# Patient Record
Sex: MALE | Race: WHITE | NOT HISPANIC OR LATINO | Employment: OTHER | URBAN - METROPOLITAN AREA
[De-identification: names, ages, dates, MRNs, and addresses within clinical notes are randomized per-mention and may not be internally consistent; named-entity substitution may affect disease eponyms.]

---

## 2017-01-27 ENCOUNTER — HOSPITAL ENCOUNTER (EMERGENCY)
Facility: HOSPITAL | Age: 37
Discharge: HOME | End: 2017-01-27
Attending: EMERGENCY MEDICINE

## 2017-01-27 VITALS
RESPIRATION RATE: 22 BRPM | HEIGHT: 68 IN | WEIGHT: 235 LBS | OXYGEN SATURATION: 95 % | HEART RATE: 110 BPM | SYSTOLIC BLOOD PRESSURE: 133 MMHG | TEMPERATURE: 98 F | BODY MASS INDEX: 35.61 KG/M2 | DIASTOLIC BLOOD PRESSURE: 67 MMHG

## 2017-01-27 DIAGNOSIS — R07.89 CHEST PAIN, ATYPICAL: Primary | ICD-10-CM

## 2017-01-27 DIAGNOSIS — R00.2 PALPITATION: ICD-10-CM

## 2017-01-27 DIAGNOSIS — F41.9 ANXIETY: ICD-10-CM

## 2017-01-27 LAB
ALBUMIN SERPL BCP-MCNC: 4.2 G/DL
ALP SERPL-CCNC: 92 U/L
ALT SERPL W/O P-5'-P-CCNC: 14 U/L
ANION GAP SERPL CALC-SCNC: 12 MMOL/L
AST SERPL-CCNC: 16 U/L
BASOPHILS # BLD AUTO: 0.02 K/UL
BASOPHILS NFR BLD: 0.3 %
BILIRUB SERPL-MCNC: 0.2 MG/DL
BNP SERPL-MCNC: <10 PG/ML
BUN SERPL-MCNC: 11 MG/DL
CALCIUM SERPL-MCNC: 9.1 MG/DL
CHLORIDE SERPL-SCNC: 107 MMOL/L
CO2 SERPL-SCNC: 23 MMOL/L
CREAT SERPL-MCNC: 0.9 MG/DL
D DIMER PPP IA.FEU-MCNC: 0.23 MG/L FEU
DIFFERENTIAL METHOD: ABNORMAL
EOSINOPHIL # BLD AUTO: 0.1 K/UL
EOSINOPHIL NFR BLD: 0.8 %
ERYTHROCYTE [DISTWIDTH] IN BLOOD BY AUTOMATED COUNT: 13.9 %
EST. GFR  (AFRICAN AMERICAN): >60 ML/MIN/1.73 M^2
EST. GFR  (NON AFRICAN AMERICAN): >60 ML/MIN/1.73 M^2
GLUCOSE SERPL-MCNC: 142 MG/DL
HCT VFR BLD AUTO: 40.8 %
HGB BLD-MCNC: 13.9 G/DL
INR PPP: 1
LYMPHOCYTES # BLD AUTO: 2.1 K/UL
LYMPHOCYTES NFR BLD: 34.1 %
MCH RBC QN AUTO: 28 PG
MCHC RBC AUTO-ENTMCNC: 34.1 %
MCV RBC AUTO: 82 FL
MONOCYTES # BLD AUTO: 0.6 K/UL
MONOCYTES NFR BLD: 10.3 %
NEUTROPHILS # BLD AUTO: 3.4 K/UL
NEUTROPHILS NFR BLD: 54.5 %
PLATELET # BLD AUTO: 226 K/UL
PMV BLD AUTO: 9.6 FL
POTASSIUM SERPL-SCNC: 3.8 MMOL/L
PROT SERPL-MCNC: 7.7 G/DL
PROTHROMBIN TIME: 10.2 SEC
RBC # BLD AUTO: 4.97 M/UL
SODIUM SERPL-SCNC: 142 MMOL/L
TROPONIN I SERPL DL<=0.01 NG/ML-MCNC: 0.01 NG/ML
WBC # BLD AUTO: 6.19 K/UL

## 2017-01-27 PROCEDURE — 85379 FIBRIN DEGRADATION QUANT: CPT

## 2017-01-27 PROCEDURE — 96374 THER/PROPH/DIAG INJ IV PUSH: CPT

## 2017-01-27 PROCEDURE — 85610 PROTHROMBIN TIME: CPT

## 2017-01-27 PROCEDURE — 96375 TX/PRO/DX INJ NEW DRUG ADDON: CPT

## 2017-01-27 PROCEDURE — 63600175 PHARM REV CODE 636 W HCPCS: Performed by: EMERGENCY MEDICINE

## 2017-01-27 PROCEDURE — 99284 EMERGENCY DEPT VISIT MOD MDM: CPT | Mod: 25

## 2017-01-27 PROCEDURE — 93005 ELECTROCARDIOGRAM TRACING: CPT

## 2017-01-27 PROCEDURE — 25000003 PHARM REV CODE 250: Performed by: EMERGENCY MEDICINE

## 2017-01-27 PROCEDURE — 83880 ASSAY OF NATRIURETIC PEPTIDE: CPT

## 2017-01-27 PROCEDURE — 80053 COMPREHEN METABOLIC PANEL: CPT

## 2017-01-27 PROCEDURE — 85025 COMPLETE CBC W/AUTO DIFF WBC: CPT

## 2017-01-27 PROCEDURE — 84484 ASSAY OF TROPONIN QUANT: CPT

## 2017-01-27 RX ORDER — ONDANSETRON 2 MG/ML
4 INJECTION INTRAMUSCULAR; INTRAVENOUS
Status: COMPLETED | OUTPATIENT
Start: 2017-01-27 | End: 2017-01-27

## 2017-01-27 RX ORDER — LORAZEPAM 2 MG/ML
1 INJECTION INTRAMUSCULAR
Status: COMPLETED | OUTPATIENT
Start: 2017-01-27 | End: 2017-01-27

## 2017-01-27 RX ORDER — ASPIRIN 325 MG
325 TABLET ORAL
Status: COMPLETED | OUTPATIENT
Start: 2017-01-27 | End: 2017-01-27

## 2017-01-27 RX ADMIN — LORAZEPAM 1 MG: 2 INJECTION, SOLUTION INTRAMUSCULAR; INTRAVENOUS at 10:01

## 2017-01-27 RX ADMIN — ONDANSETRON 4 MG: 2 INJECTION INTRAMUSCULAR; INTRAVENOUS at 09:01

## 2017-01-27 RX ADMIN — ASPIRIN 325 MG ORAL TABLET 325 MG: 325 PILL ORAL at 09:01

## 2017-01-27 NOTE — ED AVS SNAPSHOT
[facility]  [place] [address]               Garth Tejeda   2017  9:25 PM   ED    Description:  Male : 1980   Department:  [facility]           Your Care was Coordinated By:     Provider Role From To    Ronal Whitlock MD Attending Provider [id number] --      Reason for Visit     Chest Pain           Diagnoses this Visit        Comments    Chest pain, atypical    -  Primary     Palpitation         Anxiety           ED Disposition     ED Disposition Condition Comment    Discharge             To Do List           Follow-up Information     Schedule an appointment as soon as possible for a visit with Gómez Parisi MD.    Specialty:  Family Medicine    Contact information:    [address] [address]  [phone]          Schedule an appointment as soon as possible for a visit with Deangelo eMdellin MD.    Specialty:  Cardiology    Contact information:    [address] [place] [id number] [phone]          Follow up with [facility].    Specialty:  Emergency Medicine    Why:  As needed, If symptoms worsen    Contact information:    [address] [place] [id number] [phone]      [facility]     [facility] -  Assistance  Registered nurses in the [facility] provide clinical advisement, health education, appointment booking, and other advisory services.  Call for this free service at [phone].             Medications           These medications were administered today        Dose Freq    aspirin tablet 325 mg 325 mg ED 1 Time    Sig: Take 1 tablet (325 mg total) by mouth ED 1 Time.    Class: Normal    Route: Oral    ondansetron injection 4 mg 4 mg ED 1 Time    Sig: Inject 4 mg into the vein ED 1 Time.    Class: Normal    Route: Intravenous    lorazepam injection 1 mg 1 mg ED 1 Time    Sig: Inject 0.5 mLs (1 mg total)  "into the vein ED 1 Time.    Class: Normal    Route: Intravenous           Verify that the below list of medications is an accurate representation of the medications you are currently taking.  If none reported, the list may be blank. If incorrect, please contact your healthcare provider. Carry this list with you in case of emergency.           Current Medications     carvedilol (COREG) 6.25 MG tablet TAKE ONE TABLET BY MOUTH TWICE DAILY WITH FOOD FOR BLOOD PRESSURE    clonazePAM (KLONOPIN) 1 MG tablet Take 1 tablet (1 mg total) by mouth 2 (two) times daily.    dextroamphetamine-amphetamine (ADDERALL) 10 mg Tab Take 1 tablet by mouth 2 (two) times daily.    fluticasone (FLONASE) 50 mcg/actuation nasal spray 2 sprays by Each Nare route once daily.    lisinopril (PRINIVIL,ZESTRIL) 5 MG tablet Take 1 tablet by mouth once daily.    lisinopril 10 MG tablet Take 10 mg by mouth 2 (two) times daily.    trazodone (DESYREL) 50 MG tablet Take 1 tablet (50 mg total) by mouth every evening.           Clinical Reference Information           Your Vitals Were     BP Pulse Temp Resp Height Weight    135/75 110 98.2 °F (36.8 °C) (Oral) 22 5' 8" (1.727 m) 106.6 kg (235 lb)    SpO2 BMI             97% 35.73 kg/m2         Allergies as of 1/27/2017     No Known Allergies      Immunizations Administered on Date of Encounter - 1/27/2017     None      ED Micro, Lab, POCT     Start Ordered       Status Ordering Provider    01/27/17 2142 01/27/17 2141  D dimer, quantitative  STAT      Final result     01/27/17 2136 01/27/17 2135  CBC auto differential  STAT      Final result     01/27/17 2136 01/27/17 2135  Comprehensive metabolic panel  STAT      Final result     01/27/17 2136 01/27/17 2135  Protime-INR  STAT      Final result     01/27/17 2136 01/27/17 2135  Troponin I  Now then every 3 hours     Comments:  PLEASE REVIEW ORDER START TIME BEFORE MARKING SPECIMEN COLLECTED.   Start Status   01/27/17 2136 Final result   01/28/17 0036 " "Acknowledged       Acknowledged     01/27/17 2136 01/27/17 2135  B-Type natriuretic peptide (BNP)  STAT      Final result       ED Imaging Orders     Start Ordered       Status Ordering Provider    01/27/17 2136 01/27/17 2135  X-Ray Chest AP Portable  1 time imaging      Final result         Discharge Instructions       Return to the Emergency Department of any acute worsening of your symptoms or for any other concern.     You should return to the ED for fever/chills, shortness of breath, chest pain, weakness or "passing out".     Pt should take all medications as prescribed.    Pt should follow up with PCP as soon as possible.    The risks associated with not taking your medications as prescribed and not following up with your Primary Care doctor or sub specialist includes worsening of your condition, pain, disability, loss of function or livelihood, and death      ARVIND Whitlock M.D. 10:56 PM 1/27/2017        Discharge References/Attachments     ANXIETY REACTION (ENGLISH)    PALPITATIONS (ENGLISH)       [facility] complies with applicable Federal civil rights laws and does not discriminate on the basis of race, color, national origin, age, disability, or sex.        Language Assistance Services     ATTENTION: Language assistance services are available, free of charge. Please call [phone].      ATENCIÓN: Si habla español, tiene a rodriguez disposición servicios gratuitos de asistencia lingüística. Llame al [phone].     CHÚ Ý: N?u b?n nói Ti?ng Vi?t, có các d?ch v? h? tr? ngôn ng? mi?n phí dành cho b?n. G?i s? [phone].        "

## 2017-01-28 NOTE — DISCHARGE INSTRUCTIONS
"Return to the Emergency Department of any acute worsening of your symptoms or for any other concern.     You should return to the ED for fever/chills, shortness of breath, chest pain, weakness or "passing out".     Pt should take all medications as prescribed.    Pt should follow up with PCP as soon as possible.    The risks associated with not taking your medications as prescribed and not following up with your Primary Care doctor or sub specialist includes worsening of your condition, pain, disability, loss of function or livelihood, and death      ARVIND Whitlock M.D. 10:56 PM 1/27/2017      "

## 2017-01-28 NOTE — ED NOTES
"  Pt reports wanting a Clonopin for anxiety and states "I usually take a Clonopin for my anxiety when it goes up." MD Whitlock notified.     "

## 2017-01-28 NOTE — ED TRIAGE NOTES
"Pt reports eating two "pot brownies" at a friend's house. States it is the first time he has ever tried marijuana. PT reports chest pain began about 9pm today while at home, states it is midsternal but occasionally travels to left side of chest. PT reports pain is better when he ambulates, at its worst the chest pressure/tightness is at a 7/10. PT denies SOB or need for oxygen.   "

## 2017-01-28 NOTE — ED PROVIDER NOTES
Encounter Date: 1/27/2017    SCRIBE #1 NOTE: I, Desiree Green, am scribing for, and in the presence of,  Ronal Whitlock MD. I have scribed the following portions of the note - Other sections scribed: HPI, ROS.       History     Chief Complaint   Patient presents with    Chest Pain     L sided chest pain started 20 min pta, pain described as pressure that comes and goes, PMH of HTN and anxiety      Review of patient's allergies indicates:  No Known Allergies  HPI Comments: CC: Chest Pain    HPI: 36 year old male with a PMHx of panic attacks, HTN, and a septal defect presents to the ED c/o acute, mild (2/10) left chest pain. Patient reports associated chest tightness, palpitations, shortness of breath, and nausea. Patient states he was sitting in a chair and suddenly started experiencing the pain around 2100 tonight. Patient reports looking at his watch which said his heart rate was 170. Patient states he had 2 alcoholic drinks and 2 brownies containing marijuana this evening. Patient denies any recent additional stress. Patient otherwise denies diaphoresis, vomiting, abdominal pain and other symptoms. Patient states he has experienced similar symptoms during previous panic attacks, but not to this extent. Patient notes he took all of his prescribed daily medication which includes Klonopin, blood pressure medicine, and beta blockers. 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       The history is provided by the patient. No  was used.     Past Medical History   Diagnosis Date    Septal defect      No past medical history pertinent negatives.  Past Surgical History   Procedure Laterality Date    Cardiac surgery      Wrist surgery       right     Family History   Problem Relation Age of Onset    Diabetes Mother     Hypertension Mother      Social History   Substance Use Topics    Smoking status: Never  Smoker    Smokeless tobacco: None    Alcohol use Yes      Comment: socially      Review of Systems   Constitutional: Negative for diaphoresis and fever.   HENT: Negative for sore throat.    Eyes: Negative for pain.   Respiratory: Positive for chest tightness and shortness of breath.    Cardiovascular: Positive for chest pain and palpitations.   Gastrointestinal: Positive for nausea. Negative for abdominal pain and vomiting.   Genitourinary: Negative for dysuria.   Musculoskeletal: Negative for back pain.   Skin: Negative for rash.   Neurological: Negative for headaches.       Physical Exam   Initial Vitals   BP Pulse Resp Temp SpO2   01/27/17 2122 01/27/17 2122 01/27/17 2122 01/27/17 2122 01/27/17 2122   116/93 118 22 98.2 °F (36.8 °C) 96 %     Physical Exam    Vitals reviewed.  Constitutional: He appears well-developed and well-nourished.   HENT:   Head: Normocephalic and atraumatic.   Right Ear: External ear normal.   Left Ear: External ear normal.   Nose: Nose normal.   Mouth/Throat: Oropharynx is clear and moist.   Eyes: EOM are normal. Pupils are equal, round, and reactive to light.   Neck: Normal range of motion. Neck supple.   Cardiovascular: Regular rhythm, normal heart sounds and intact distal pulses. Tachycardia present.    Pulmonary/Chest: Effort normal and breath sounds normal. No respiratory distress. He has no wheezes. He has no rhonchi. He has no rales.   Abdominal: Soft. Bowel sounds are normal.   Musculoskeletal: Normal range of motion.   Neurological: He is alert and oriented to person, place, and time.   Skin: Skin is warm and dry.   Psychiatric: His mood appears anxious. Thought content is paranoid (States he feels his blood pumping through his body). Cognition and memory are normal.         ED Course   Procedures  Labs Reviewed   CBC W/ AUTO DIFFERENTIAL - Abnormal; Notable for the following:        Result Value    Hemoglobin 13.9 (*)     All other components within normal limits    Narrative:      PLEASE REVIEW ORDER START TIME BEFORE MARKING SPECIMEN  COLLECTED.   COMPREHENSIVE METABOLIC PANEL - Abnormal; Notable for the following:     Glucose 142 (*)     All other components within normal limits    Narrative:     PLEASE REVIEW ORDER START TIME BEFORE MARKING SPECIMEN  COLLECTED.   PROTIME-INR    Narrative:     PLEASE REVIEW ORDER START TIME BEFORE MARKING SPECIMEN  COLLECTED.   TROPONIN I    Narrative:     PLEASE REVIEW ORDER START TIME BEFORE MARKING SPECIMEN  COLLECTED.   B-TYPE NATRIURETIC PEPTIDE    Narrative:     PLEASE REVIEW ORDER START TIME BEFORE MARKING SPECIMEN  COLLECTED.   D DIMER, QUANTITATIVE    Narrative:     PLEASE REVIEW ORDER START TIME BEFORE MARKING SPECIMEN  COLLECTED.   TROPONIN I     EKG Readings: (Independently Interpreted)   Initial Reading: No STEMI. Rhythm: Sinus Tachycardia. Ectopy: No Ectopy. Conduction: Normal. ST Segments: Normal ST Segments. T Waves: Normal. Clinical Impression: Normal Sinus Rhythm       X-Rays:   Independently Interpreted Readings:   Chest X-Ray: Normal heart size.  No infiltrates.  No acute abnormalities.       Medical decision-making:    The patient received a medical screening exam. If performed, the EKG was independently evaluated by me and is pending final cardiology evaluation.  If performed, all radiographic studies were independently evaluated by me and are pending final radiology evaluation. If labs were ordered, they were reviewed. Vital signs are independently assessed by me.  If performed, the pulse oximetry was independently evaluated by me.  I decided to obtain the patient's past medical record.  If available, I reviewed the patient's past medical record, including most recent labs and radiology reports.    This is an emergent evaluation for a patient with chest pain.The patient's pain is atypical for cardiac etiology.  I decided to obtain and review the patient's past medical record.        The vital signs are stable in the room.    EKG  is normal.  There is no evidence of STEMI or ischemia.    CXR is negative for pneumonia, pneumothorax and edema.  Troponin is negative and was drawn at least 8 hours since the onset of pain.  I doubt ACS.  BNP is negative.  There is no evidence of congestive heart failure.  The electrolytes are relatively normal.  The pt is not anemic.  DDIMER is negative.  I doubt PE.    Patient presents clinically with anxiousness.  Every time the patient gets more anxious he becomes more tachycardic.  With rest and reassurance.  The tachycardia improves.  I will watch him on the monitor and the tachycardia will resolve, but every time I walk into the room the tachycardia returns.  Patient also exhibits a toxidrome of marijuana intoxication.    The pt's symptoms were treated with:    Medications   aspirin tablet 325 mg (325 mg Oral Given 1/27/17 2147)   ondansetron injection 4 mg (4 mg Intravenous Given 1/27/17 2151)   lorazepam injection 1 mg (1 mg Intravenous Given 1/27/17 2243)       Currently the patient has a a non-diagnostic EKG with negative troponin in the emergency department.  I doubt acute coronary syndrome.  I did inform the patient that even with negative testing, we can never eliminate all risk.  I believe the patient is low risk with negative initial testing; they are appropriate for close outpatient follow-up.  The patient is aware of the small but persistent risk for MI/ACS with subsequent cardiac complications or death.  I have low suspicion for cardiopulmonary, vascular, infectious, respiratory, or other emergent medical condition based on my evaluation in the ED.     The patient's pain is currently improved.      The results and physical exam findings were reviewed with the patient. Pt agrees with assessment, disposition and treatment plan and has no further questions or complaints at this time.      ARVIND Whitlock M.D. 10:58 PM 1/27/2017            Scribe Attestation:   Scribe #1: I performed the above  scribed service and the documentation accurately describes the services I performed. I attest to the accuracy of the note.    Attending Attestation:           Physician Attestation for Scribe:  Physician Attestation Statement for Scribe #1: I, Ronal Whitlock MD, reviewed documentation, as scribed by Desiree Green in my presence, and it is both accurate and complete.                 ED Course     Clinical Impression:   The primary encounter diagnosis was Chest pain, atypical. Diagnoses of Palpitation and Anxiety were also pertinent to this visit.          Ronal Whitlock MD  01/27/17 6421

## 2017-02-22 ENCOUNTER — OFFICE VISIT (OUTPATIENT)
Dept: FAMILY MEDICINE | Facility: CLINIC | Age: 37
End: 2017-02-22
Payer: COMMERCIAL

## 2017-02-22 VITALS
HEART RATE: 79 BPM | OXYGEN SATURATION: 96 % | HEIGHT: 68 IN | SYSTOLIC BLOOD PRESSURE: 98 MMHG | BODY MASS INDEX: 36.75 KG/M2 | DIASTOLIC BLOOD PRESSURE: 50 MMHG | TEMPERATURE: 98 F | WEIGHT: 242.5 LBS

## 2017-02-22 DIAGNOSIS — Z00.00 ANNUAL PHYSICAL EXAM: Primary | ICD-10-CM

## 2017-02-22 DIAGNOSIS — F98.8 ADD (ATTENTION DEFICIT DISORDER): ICD-10-CM

## 2017-02-22 DIAGNOSIS — I10 ESSENTIAL HYPERTENSION: ICD-10-CM

## 2017-02-22 DIAGNOSIS — F41.9 ANXIETY: ICD-10-CM

## 2017-02-22 PROCEDURE — 99999 PR PBB SHADOW E&M-EST. PATIENT-LVL III: CPT | Mod: PBBFAC,,, | Performed by: FAMILY MEDICINE

## 2017-02-22 PROCEDURE — 99395 PREV VISIT EST AGE 18-39: CPT | Mod: S$GLB,,, | Performed by: FAMILY MEDICINE

## 2017-02-22 PROCEDURE — 3078F DIAST BP <80 MM HG: CPT | Mod: S$GLB,,, | Performed by: FAMILY MEDICINE

## 2017-02-22 PROCEDURE — 3074F SYST BP LT 130 MM HG: CPT | Mod: S$GLB,,, | Performed by: FAMILY MEDICINE

## 2017-02-22 RX ORDER — CARVEDILOL 12.5 MG/1
TABLET ORAL
Refills: 6 | COMMUNITY
Start: 2017-01-10 | End: 2019-03-11 | Stop reason: SDUPTHER

## 2017-02-22 RX ORDER — DEXTROAMPHETAMINE SACCHARATE, AMPHETAMINE ASPARTATE, DEXTROAMPHETAMINE SULFATE AND AMPHETAMINE SULFATE 2.5; 2.5; 2.5; 2.5 MG/1; MG/1; MG/1; MG/1
1 TABLET ORAL 2 TIMES DAILY
Qty: 60 TABLET | Refills: 0 | Status: SHIPPED | OUTPATIENT
Start: 2017-03-19 | End: 2017-11-08 | Stop reason: SDUPTHER

## 2017-02-22 RX ORDER — DEXTROAMPHETAMINE SACCHARATE, AMPHETAMINE ASPARTATE, DEXTROAMPHETAMINE SULFATE AND AMPHETAMINE SULFATE 2.5; 2.5; 2.5; 2.5 MG/1; MG/1; MG/1; MG/1
1 TABLET ORAL 2 TIMES DAILY
Qty: 60 TABLET | Refills: 0 | Status: SHIPPED | OUTPATIENT
Start: 2017-04-16 | End: 2017-11-08 | Stop reason: SDUPTHER

## 2017-02-22 RX ORDER — DULOXETIN HYDROCHLORIDE 30 MG/1
30 CAPSULE, DELAYED RELEASE ORAL DAILY
Qty: 30 CAPSULE | Refills: 11 | Status: SHIPPED | OUTPATIENT
Start: 2017-02-22 | End: 2018-08-10

## 2017-02-22 RX ORDER — DEXTROAMPHETAMINE SACCHARATE, AMPHETAMINE ASPARTATE, DEXTROAMPHETAMINE SULFATE AND AMPHETAMINE SULFATE 2.5; 2.5; 2.5; 2.5 MG/1; MG/1; MG/1; MG/1
1 TABLET ORAL 2 TIMES DAILY
Qty: 60 TABLET | Refills: 0 | Status: SHIPPED | OUTPATIENT
Start: 2017-02-22 | End: 2017-11-08 | Stop reason: SDUPTHER

## 2017-02-22 NOTE — PROGRESS NOTES
"Chief Complaint   Patient presents with    Follow-up     from ER     SUBJECTIVE:   Garth Tejeda is a 36 y.o. male presenting for his annual checkup.  Current Outpatient Prescriptions   Medication Sig Dispense Refill    carvedilol (COREG) 12.5 MG tablet TAKE ONE TABLET BY MOUTH TWICE DAILY WITH food.  6    clonazePAM (KLONOPIN) 1 MG tablet Take 1 tablet (1 mg total) by mouth 2 (two) times daily. 60 tablet 2    dextroamphetamine-amphetamine (ADDERALL) 10 mg Tab Take 1 tablet by mouth 2 (two) times daily. 60 tablet 0    [START ON 3/19/2017] dextroamphetamine-amphetamine (ADDERALL) 10 mg Tab Take 1 tablet by mouth 2 (two) times daily. 60 tablet 0    [START ON 4/16/2017] dextroamphetamine-amphetamine (ADDERALL) 10 mg Tab Take 1 tablet by mouth 2 (two) times daily. 60 tablet 0    fluticasone (FLONASE) 50 mcg/actuation nasal spray 2 sprays by Each Nare route once daily. 1 Bottle 1    lisinopril 10 MG tablet Take 10 mg by mouth 2 (two) times daily.  5    trazodone (DESYREL) 50 MG tablet Take 1 tablet (50 mg total) by mouth every evening. 30 tablet 0    duloxetine (CYMBALTA) 30 MG capsule Take 1 capsule (30 mg total) by mouth once daily. 30 capsule 11     No current facility-administered medications for this visit.      Allergies: Review of patient's allergies indicates no known allergies.     ROS:  Feeling well. No dyspnea or chest pain on exertion. No abdominal pain, change in bowel habits, black or bloody stools. No urinary tract or prostatic symptoms. No neurological complaints.    OBJECTIVE:   The patient appears well, alert, oriented x 3, in no distress.   Visit Vitals    BP (!) 98/50 (BP Location: Right arm, Patient Position: Sitting, BP Method: Manual)    Pulse 79    Temp 97.6 °F (36.4 °C) (Oral)    Ht 5' 8" (1.727 m)    Wt 110 kg (242 lb 8.1 oz)    SpO2 96%    BMI 36.87 kg/m2     ENT normal.  Neck supple. No adenopathy or thyromegaly. DEANNA. Lungs are clear, good air entry, no wheezes, rhonchi " or rales. S1 and S2 normal, no murmurs, regular rate and rhythm. Abdomen is soft without tenderness, guarding, mass or organomegaly.  exam: deferred.  Extremities show no edema, normal peripheral pulses. Neurological is normal without focal findings.    Reviewed ER record  ASSESSMENT:   1. Annual physical exam    2. Anxiety    3. ADD (attention deficit disorder)    4. Essential hypertension        PLAN:   Garth was seen today for follow-up.    Diagnoses and all orders for this visit:    Annual physical exam  -     CBC auto differential; Future  -     Comprehensive metabolic panel; Future  -     Hemoglobin A1c; Future  -     Lipid panel; Future  -     TSH; Future  -     Uric acid; Future  -     Urinalysis; Future    Anxiety  -     duloxetine (CYMBALTA) 30 MG capsule; Take 1 capsule (30 mg total) by mouth once daily.    ADD (attention deficit disorder)  -     dextroamphetamine-amphetamine (ADDERALL) 10 mg Tab; Take 1 tablet by mouth 2 (two) times daily.  -     dextroamphetamine-amphetamine (ADDERALL) 10 mg Tab; Take 1 tablet by mouth 2 (two) times daily.  -     dextroamphetamine-amphetamine (ADDERALL) 10 mg Tab; Take 1 tablet by mouth 2 (two) times daily.    Essential hypertension  -     Hypertension Digital Medicine (HDMP)  Enrollment Order  -     Assign HDMP Onboarding Questionnaire Series        Counseled on age appropriate medical preventative services, including age appropriate cancer screenings, over all nutritional health, need for a consistent exercise regimen and an over all push towards maintaining a vigorous and active lifestyle.  Counseled on age appropriate vaccines and discussed upcoming health care needs based on age/gender.  Spent time with patient counseling on need for a good patient/doctor relationship moving forward.  Discussed use of common OTC medications and supplements.  Discussed common dietary aids and use of caffeine and the need for good sleep hygiene and stress  management.    testicularself exams taught

## 2017-03-03 ENCOUNTER — PATIENT MESSAGE (OUTPATIENT)
Dept: ADMINISTRATIVE | Facility: OTHER | Age: 37
End: 2017-03-03

## 2017-03-16 ENCOUNTER — PATIENT MESSAGE (OUTPATIENT)
Dept: ADMINISTRATIVE | Facility: OTHER | Age: 37
End: 2017-03-16

## 2017-04-07 DIAGNOSIS — F41.9 ANXIETY: ICD-10-CM

## 2017-04-10 RX ORDER — CLONAZEPAM 1 MG/1
TABLET ORAL
Qty: 60 TABLET | Refills: 2 | Status: SHIPPED | OUTPATIENT
Start: 2017-04-10 | End: 2017-06-19 | Stop reason: SDUPTHER

## 2017-04-11 DIAGNOSIS — F41.9 ANXIETY: ICD-10-CM

## 2017-04-12 RX ORDER — CLONAZEPAM 1 MG/1
TABLET ORAL
Qty: 60 TABLET | OUTPATIENT
Start: 2017-04-12

## 2017-06-19 DIAGNOSIS — F41.9 ANXIETY: ICD-10-CM

## 2017-06-20 RX ORDER — CLONAZEPAM 1 MG/1
TABLET ORAL
Qty: 60 TABLET | Refills: 0 | Status: SHIPPED | OUTPATIENT
Start: 2017-06-20 | End: 2017-11-08 | Stop reason: SDUPTHER

## 2017-06-22 DIAGNOSIS — F98.8 ADD (ATTENTION DEFICIT DISORDER): ICD-10-CM

## 2017-06-22 RX ORDER — DEXTROAMPHETAMINE SACCHARATE, AMPHETAMINE ASPARTATE, DEXTROAMPHETAMINE SULFATE AND AMPHETAMINE SULFATE 2.5; 2.5; 2.5; 2.5 MG/1; MG/1; MG/1; MG/1
1 TABLET ORAL 2 TIMES DAILY
Qty: 60 TABLET | Refills: 0 | OUTPATIENT
Start: 2017-06-22

## 2017-06-22 NOTE — TELEPHONE ENCOUNTER
LOV 02/22/2017.   patient contacted and message left that he would need to schedule an appointment for his 3 month follow-up.

## 2017-06-22 NOTE — TELEPHONE ENCOUNTER
----- Message from Luz Lorenzana sent at 6/22/2017 12:12 PM CDT -----  Contact: Self  Patient came into the office and requested a refill on Adderol. He can be reached at [phone].

## 2017-08-11 ENCOUNTER — PATIENT MESSAGE (OUTPATIENT)
Dept: OTHER | Facility: OTHER | Age: 37
End: 2017-08-11

## 2017-11-08 ENCOUNTER — OFFICE VISIT (OUTPATIENT)
Dept: FAMILY MEDICINE | Facility: CLINIC | Age: 37
End: 2017-11-08
Payer: COMMERCIAL

## 2017-11-08 VITALS
OXYGEN SATURATION: 97 % | HEIGHT: 68 IN | SYSTOLIC BLOOD PRESSURE: 112 MMHG | TEMPERATURE: 99 F | WEIGHT: 249.81 LBS | HEART RATE: 105 BPM | BODY MASS INDEX: 37.86 KG/M2 | DIASTOLIC BLOOD PRESSURE: 80 MMHG

## 2017-11-08 DIAGNOSIS — J01.90 ACUTE SINUSITIS WITH SYMPTOMS GREATER THAN 10 DAYS: Primary | ICD-10-CM

## 2017-11-08 DIAGNOSIS — F98.8 ATTENTION DEFICIT DISORDER, UNSPECIFIED HYPERACTIVITY PRESENCE: ICD-10-CM

## 2017-11-08 DIAGNOSIS — H61.21 IMPACTED CERUMEN OF RIGHT EAR: ICD-10-CM

## 2017-11-08 DIAGNOSIS — E66.01 SEVERE OBESITY (BMI 35.0-39.9) WITH COMORBIDITY: ICD-10-CM

## 2017-11-08 DIAGNOSIS — F41.9 ANXIETY: ICD-10-CM

## 2017-11-08 PROCEDURE — 99999 PR PBB SHADOW E&M-EST. PATIENT-LVL III: CPT | Mod: PBBFAC,,, | Performed by: INTERNAL MEDICINE

## 2017-11-08 PROCEDURE — 99214 OFFICE O/P EST MOD 30 MIN: CPT | Mod: 25,S$GLB,, | Performed by: INTERNAL MEDICINE

## 2017-11-08 PROCEDURE — 69210 REMOVE IMPACTED EAR WAX UNI: CPT | Mod: S$GLB,,, | Performed by: INTERNAL MEDICINE

## 2017-11-08 RX ORDER — DOXYCYCLINE 100 MG/1
100 CAPSULE ORAL 2 TIMES DAILY
Qty: 14 CAPSULE | Refills: 0 | Status: SHIPPED | OUTPATIENT
Start: 2017-11-08 | End: 2017-11-15

## 2017-11-08 RX ORDER — CLONAZEPAM 1 MG/1
1 TABLET ORAL 2 TIMES DAILY
Qty: 60 TABLET | Refills: 0 | Status: SHIPPED | OUTPATIENT
Start: 2017-11-08 | End: 2018-04-02 | Stop reason: SDUPTHER

## 2017-11-08 RX ORDER — DEXTROAMPHETAMINE SACCHARATE, AMPHETAMINE ASPARTATE, DEXTROAMPHETAMINE SULFATE AND AMPHETAMINE SULFATE 2.5; 2.5; 2.5; 2.5 MG/1; MG/1; MG/1; MG/1
1 TABLET ORAL 2 TIMES DAILY
Qty: 60 TABLET | Refills: 0 | Status: SHIPPED | OUTPATIENT
Start: 2017-11-08 | End: 2017-11-16 | Stop reason: SDUPTHER

## 2017-11-08 RX ORDER — BENZONATATE 100 MG/1
100 CAPSULE ORAL 3 TIMES DAILY PRN
Qty: 30 CAPSULE | Refills: 0 | Status: SHIPPED | OUTPATIENT
Start: 2017-11-08 | End: 2017-11-18

## 2017-11-08 RX ORDER — CODEINE PHOSPHATE AND GUAIFENESIN 10; 100 MG/5ML; MG/5ML
5 SOLUTION ORAL NIGHTLY PRN
Qty: 180 ML | Refills: 0 | Status: SHIPPED | OUTPATIENT
Start: 2017-11-08 | End: 2017-11-16 | Stop reason: ALTCHOICE

## 2017-11-08 NOTE — PROGRESS NOTES
SUBJECTIVE     Chief Complaint   Patient presents with    Sinus Problem       HPI  Garth Tejeda is a 37 y.o. male with multiple medical diagnoses as listed in the medical history and problem list that presents for evaluation of sinusitis x 2 weeks. Pt reports some L maxillary sinus pain and thought it was due to a tooth abscess, so he went to the dentist. He had an xray done and he did not have an abscess so he was advised to come to his PCP. Pt reports dry cough, nasal congestion, and SOB. Denies any fever, chills, or night sweats. Pt has been taking Mucinex without any relief of symptoms. +sick contacts(wife and daughter with URI). Denies any recent travel.     PAST MEDICAL HISTORY:  Past Medical History:   Diagnosis Date    Anxiety     Hypertension     Septal defect        PAST SURGICAL HISTORY:  Past Surgical History:   Procedure Laterality Date    CARDIAC SURGERY      WRIST SURGERY      right       SOCIAL HISTORY:  Social History     Social History    Marital status:      Spouse name: N/A    Number of children: N/A    Years of education: N/A     Occupational History    Not on file.     Social History Main Topics    Smoking status: Never Smoker    Smokeless tobacco: Not on file    Alcohol use Yes      Comment: socially     Drug use:      Types: Marijuana    Sexual activity: Yes     Partners: Female     Other Topics Concern    Not on file     Social History Narrative    HAS [employer]       FAMILY HISTORY:  Family History   Problem Relation Age of Onset    Diabetes Mother     Hypertension Mother        ALLERGIES AND MEDICATIONS: updated and reviewed.  Review of patient's allergies indicates:  No Known Allergies  Current Outpatient Prescriptions   Medication Sig Dispense Refill    carvedilol (COREG) 12.5 MG tablet TAKE ONE TABLET BY MOUTH TWICE DAILY WITH food.  6    clonazePAM (KLONOPIN) 1 MG tablet Take 1 tablet (1 mg total) by mouth 2 (two) times daily. 60 tablet 0     "dextroamphetamine-amphetamine (ADDERALL) 10 mg Tab Take 1 tablet by mouth 2 (two) times daily. 60 tablet 0    duloxetine (CYMBALTA) 30 MG capsule Take 1 capsule (30 mg total) by mouth once daily. 30 capsule 11    fluticasone (FLONASE) 50 mcg/actuation nasal spray 2 sprays by Each Nare route once daily. 1 Bottle 1    lisinopril 10 MG tablet Take 10 mg by mouth 2 (two) times daily.  5    benzonatate (TESSALON) 100 MG capsule Take 1 capsule (100 mg total) by mouth 3 (three) times daily as needed. 30 capsule 0    doxycycline (MONODOX) 100 MG capsule Take 1 capsule (100 mg total) by mouth 2 (two) times daily. 14 capsule 0    guaifenesin-codeine 100-10 mg/5 ml (TUSSI-ORGANIDIN NR)  mg/5 mL syrup Take 5 mLs by mouth nightly as needed for Cough or Congestion (MAY CAUSE DROWSINESS). 180 mL 0     No current facility-administered medications for this visit.        ROS  Review of Systems   Constitutional: Negative for chills and fever.   HENT: Positive for congestion. Negative for hearing loss and sore throat.    Eyes: Negative for visual disturbance.   Respiratory: Positive for cough and shortness of breath.    Cardiovascular: Negative for chest pain, palpitations and leg swelling.   Gastrointestinal: Negative for abdominal pain, constipation, diarrhea, nausea and vomiting.   Genitourinary: Negative for dysuria, frequency and urgency.   Musculoskeletal: Negative for arthralgias, joint swelling and myalgias.   Skin: Negative for rash and wound.   Neurological: Negative for headaches.   Psychiatric/Behavioral: Negative for agitation and confusion. The patient is not nervous/anxious.          OBJECTIVE     Physical Exam  Vitals:    11/08/17 1516   BP: 112/80   Pulse: 105   Temp: 98.6 °F (37 °C)    Body mass index is 37.98 kg/m².  Weight: 113.3 kg (249 lb 12.5 oz)   Height: 5' 8" (172.7 cm)     Physical Exam   Constitutional: He is oriented to person, place, and time. He appears well-developed and well-nourished. No " distress.   HENT:   Head: Normocephalic and atraumatic.   Right Ear: Hearing, tympanic membrane and external ear normal.   Left Ear: External ear normal.   Nose: Rhinorrhea present. Right sinus exhibits no maxillary sinus tenderness and no frontal sinus tenderness. Left sinus exhibits maxillary sinus tenderness. Left sinus exhibits no frontal sinus tenderness.   Mouth/Throat: No uvula swelling. Posterior oropharyngeal erythema present. No posterior oropharyngeal edema. No tonsillar exudate.   R ear cerumen impaction   Eyes: Conjunctivae and EOM are normal. Right eye exhibits no discharge. Left eye exhibits no discharge. No scleral icterus.   Neck: Normal range of motion. Neck supple. No JVD present. No tracheal deviation present.   Cardiovascular: Normal rate, regular rhythm, normal heart sounds and intact distal pulses.  Exam reveals no gallop and no friction rub.    No murmur heard.  Pulmonary/Chest: Effort normal and breath sounds normal. No respiratory distress. He has no wheezes.   Abdominal: Soft. Bowel sounds are normal. He exhibits no distension and no mass. There is no tenderness. There is no rebound and no guarding.   Musculoskeletal: Normal range of motion. He exhibits no edema, tenderness or deformity.   Neurological: He is alert and oriented to person, place, and time. He exhibits normal muscle tone. Coordination normal.   Skin: Skin is warm and dry. No rash noted. No erythema.   Psychiatric: He has a normal mood and affect. His behavior is normal. Judgment and thought content normal.         Health Maintenance       Date Due Completion Date    Lipid Panel 12/31/2020 12/31/2015    TETANUS VACCINE 02/25/2027 2/25/2017 (Not Clinical)    Override on 2/25/2017: Not Clinically Appropriate (said he had it done, will try to get the date)            ASSESSMENT     37 y.o. male with     1. Acute sinusitis with symptoms greater than 10 days    2. Anxiety    3. Impacted cerumen of right ear    4. Attention deficit  disorder, unspecified hyperactivity presence    5. Severe obesity (BMI 35.0-39.9) with comorbidity        PLAN:     1. Acute sinusitis with symptoms greater than 10 days  - Pt advised to take Abx to completion  - Continue symptomatic treatment with rest, increase fluid intake, tylenol or ibuprofen PRN fever(temp >/= 100.4) or body aches. Okay to take OTC antihistamines, i.e. Bendaryl, Claritin, Allegra, etc. as needed.  - Okay to gargle with warm, salt water or use throat lozenges as needed  - doxycycline (MONODOX) 100 MG capsule; Take 1 capsule (100 mg total) by mouth 2 (two) times daily.  Dispense: 14 capsule; Refill: 0  - guaifenesin-codeine 100-10 mg/5 ml (TUSSI-ORGANIDIN NR)  mg/5 mL syrup; Take 5 mLs by mouth nightly as needed for Cough or Congestion (MAY CAUSE DROWSINESS).  Dispense: 180 mL; Refill: 0  - benzonatate (TESSALON) 100 MG capsule; Take 1 capsule (100 mg total) by mouth 3 (three) times daily as needed.  Dispense: 30 capsule; Refill: 0    2. Anxiety  - Stable; no acute issues  - The current medical regimen is effective;  continue present plan and medications.  - clonazePAM (KLONOPIN) 1 MG tablet; Take 1 tablet (1 mg total) by mouth 2 (two) times daily.  Dispense: 60 tablet; Refill: 0    3. Impacted cerumen of right ear  - s/p removal with curette on exam today    4. Attention deficit disorder, unspecified hyperactivity presence  - Stable; no acute issues  - The current medical regimen is effective;  continue present plan and medications.  - dextroamphetamine-amphetamine (ADDERALL) 10 mg Tab; Take 1 tablet by mouth 2 (two) times daily.  Dispense: 60 tablet; Refill: 0    5. Severe obesity (BMI 35.0-39.9) with comorbidity  - Pt aware of importance of eating a prudent diet and exercising        RTC in 1-2 weeks as needed for any acute worsening of current condition or failure to improve     Deepali Landrum MD  11/08/2017 3:22 PM        No Follow-up on file.

## 2017-11-16 ENCOUNTER — OFFICE VISIT (OUTPATIENT)
Dept: FAMILY MEDICINE | Facility: CLINIC | Age: 37
End: 2017-11-16
Payer: COMMERCIAL

## 2017-11-16 VITALS
OXYGEN SATURATION: 98 % | BODY MASS INDEX: 37.96 KG/M2 | RESPIRATION RATE: 16 BRPM | TEMPERATURE: 99 F | DIASTOLIC BLOOD PRESSURE: 70 MMHG | HEIGHT: 68 IN | WEIGHT: 250.44 LBS | SYSTOLIC BLOOD PRESSURE: 106 MMHG | HEART RATE: 85 BPM

## 2017-11-16 DIAGNOSIS — Z87.74 HISTORY OF CONGENITAL HEART DEFECT: ICD-10-CM

## 2017-11-16 DIAGNOSIS — R06.89 DECREASED BREATH SOUNDS AT LEFT LUNG BASE: Primary | ICD-10-CM

## 2017-11-16 DIAGNOSIS — F98.8 ATTENTION DEFICIT DISORDER, UNSPECIFIED HYPERACTIVITY PRESENCE: ICD-10-CM

## 2017-11-16 DIAGNOSIS — J01.90 ACUTE SINUSITIS WITH SYMPTOMS GREATER THAN 10 DAYS: ICD-10-CM

## 2017-11-16 PROCEDURE — 99999 PR PBB SHADOW E&M-EST. PATIENT-LVL IV: CPT | Mod: PBBFAC,,, | Performed by: FAMILY MEDICINE

## 2017-11-16 PROCEDURE — 99214 OFFICE O/P EST MOD 30 MIN: CPT | Mod: S$GLB,,, | Performed by: FAMILY MEDICINE

## 2017-11-16 RX ORDER — DEXTROAMPHETAMINE SACCHARATE, AMPHETAMINE ASPARTATE, DEXTROAMPHETAMINE SULFATE AND AMPHETAMINE SULFATE 2.5; 2.5; 2.5; 2.5 MG/1; MG/1; MG/1; MG/1
1 TABLET ORAL 2 TIMES DAILY
Qty: 60 TABLET | Refills: 0 | Status: SHIPPED | OUTPATIENT
Start: 2017-12-05 | End: 2018-04-02 | Stop reason: SDUPTHER

## 2017-11-16 RX ORDER — CODEINE PHOSPHATE AND GUAIFENESIN 10; 100 MG/5ML; MG/5ML
5 SOLUTION ORAL NIGHTLY PRN
Qty: 180 ML | Refills: 0 | Status: SHIPPED | OUTPATIENT
Start: 2017-11-16 | End: 2017-11-26

## 2017-11-16 RX ORDER — AZITHROMYCIN 500 MG/1
500 TABLET, FILM COATED ORAL DAILY
Qty: 3 TABLET | Refills: 0 | Status: SHIPPED | OUTPATIENT
Start: 2017-11-16 | End: 2017-11-19

## 2017-11-16 NOTE — PROGRESS NOTES
Chief Complaint   Patient presents with    URI     x 2 weeks       SUBJECTIVE:  Garth Tejeda is a 37 y.o. male here for new problem of URI x 2 weeks, antibiotics and all didn't help and now with more wheezing and Bhat.  Currently has co-morbidities including per problem list.      Past Medical History:   Diagnosis Date    Anxiety     Hypertension     Septal defect      Past Surgical History:   Procedure Laterality Date    CARDIAC SURGERY      WRIST SURGERY      right     Social History     Social History    Marital status:      Spouse name: N/A    Number of children: N/A    Years of education: N/A     Occupational History    Not on file.     Social History Main Topics    Smoking status: Never Smoker    Smokeless tobacco: Never Used    Alcohol use Yes      Comment: socially     Drug use:      Types: Marijuana    Sexual activity: Yes     Partners: Female     Other Topics Concern    Not on file     Social History Narrative    HAS [employer]     Family History   Problem Relation Age of Onset    Diabetes Mother     Hypertension Mother      Current Outpatient Prescriptions on File Prior to Visit   Medication Sig Dispense Refill    benzonatate (TESSALON) 100 MG capsule Take 1 capsule (100 mg total) by mouth 3 (three) times daily as needed. 30 capsule 0    carvedilol (COREG) 12.5 MG tablet TAKE ONE TABLET BY MOUTH TWICE DAILY WITH food.  6    clonazePAM (KLONOPIN) 1 MG tablet Take 1 tablet (1 mg total) by mouth 2 (two) times daily. 60 tablet 0    duloxetine (CYMBALTA) 30 MG capsule Take 1 capsule (30 mg total) by mouth once daily. 30 capsule 11    fluticasone (FLONASE) 50 mcg/actuation nasal spray 2 sprays by Each Nare route once daily. 1 Bottle 1    lisinopril 10 MG tablet Take 10 mg by mouth 2 (two) times daily.  5     No current facility-administered medications on file prior to visit.      Review of patient's allergies indicates:  No Known Allergies      Review of Systems  "  Constitutional: Negative.    HENT: Positive for congestion.    Eyes: Negative.    Respiratory: Positive for cough and shortness of breath. Negative for sputum production.    Cardiovascular: Negative.    Gastrointestinal: Negative.    Genitourinary: Negative.    Musculoskeletal: Negative.    Skin: Negative.    Neurological: Negative.    Endo/Heme/Allergies: Negative.    Psychiatric/Behavioral: Negative.        OBJECTIVE:  /70   Pulse 85   Temp 98.5 °F (36.9 °C) (Oral)   Resp 16   Ht 5' 8" (1.727 m)   Wt 113.6 kg (250 lb 7.1 oz)   SpO2 98%   BMI 38.08 kg/m²     Wt Readings from Last 3 Encounters:   11/16/17 113.6 kg (250 lb 7.1 oz)   11/08/17 113.3 kg (249 lb 12.5 oz)   02/22/17 110 kg (242 lb 8.1 oz)     BP Readings from Last 3 Encounters:   11/16/17 106/70   11/08/17 112/80   02/22/17 (!) 98/50       He appears well, in no apparent distress.  Alert and oriented times three, pleasant and cooperative. Vital signs are as documented in vital signs section.  Obese  Nose with some mild coryza  The neck is supple and free of adenopathy or masses, the thyroid is normal without enlargement or nodules.  LLL with poor BS, otherwise normal  Right side normal  Heart exam normal today    Review of old Records:  Reviewed per [facility]    Review of old labs:  Lab Results   Component Value Date    TSH 2.412 12/31/2015     Lab Results   Component Value Date    WBC 6.19 01/27/2017    HGB 13.9 (L) 01/27/2017    HCT 40.8 01/27/2017    MCV 82 01/27/2017     01/27/2017       Chemistry        Component Value Date/Time     01/27/2017 0950    K 3.8 01/27/2017 0950     01/27/2017 0950    CO2 23 01/27/2017 0950    BUN 11 01/27/2017 0950    CREATININE 0.9 01/27/2017 0950     (H) 01/27/2017 0950        Component Value Date/Time    CALCIUM 9.1 01/27/2017 0950    ALKPHOS 92 01/27/2017 0950    AST 16 01/27/2017 0950    ALT 14 01/27/2017 0950    BILITOT 0.2 01/27/2017 0950    ESTGFRAFRICA >60 01/27/2017 0950    " EGFRNONAA >60 01/27/2017 0950        Lab Results   Component Value Date    CHOL 161 12/31/2015     Lab Results   Component Value Date    HDL 56 12/31/2015     Lab Results   Component Value Date    LDLCALC 87.2 12/31/2015     Lab Results   Component Value Date    TRIG 89 12/31/2015     Lab Results   Component Value Date    CHOLHDL 34.8 12/31/2015         Review of old imaging:  n/a    ASSESSMENT:  Problem List Items Addressed This Visit     History of congenital heart defect    Relevant Orders    Brain natriuretic peptide    ADD (attention deficit disorder)    Relevant Medications    dextroamphetamine-amphetamine (ADDERALL) 10 mg Tab (Start on 12/5/2017)      Other Visit Diagnoses     Decreased breath sounds at left lung base    -  Primary    Relevant Medications    azithromycin (ZITHROMAX) 500 MG tablet    Other Relevant Orders    X-Ray Chest PA And Lateral (Completed)    Brain natriuretic peptide    Acute sinusitis with symptoms greater than 10 days        Relevant Medications    guaifenesin-codeine 100-10 mg/5 ml (TUSSI-ORGANIDIN NR)  mg/5 mL syrup          ICD-10-CM ICD-9-CM   1. Decreased breath sounds at left lung base R09.89 786.7   2. Attention deficit disorder, unspecified hyperactivity presence F98.8 314.00   3. History of congenital heart defect Z87.74 V13.65   4. Acute sinusitis with symptoms greater than 10 days J01.90 461.9         PLAN:  1. Decreased breath sounds at left lung base  Potential medication side effects were discussed with the patient; let me know if any occur.    - X-Ray Chest PA And Lateral; Future  - azithromycin (ZITHROMAX) 500 MG tablet; Take 1 tablet (500 mg total) by mouth once daily.  Dispense: 3 tablet; Refill: 0  - Brain natriuretic peptide; Future    2. Attention deficit disorder, unspecified hyperactivity presence  Potential medication side effects were discussed with the patient; let me know if any occur.    - dextroamphetamine-amphetamine (ADDERALL) 10 mg Tab; Take 1  tablet by mouth 2 (two) times daily.  Dispense: 60 tablet; Refill: 0    3. History of congenital heart defect  n/a  - Brain natriuretic peptide; Future      Medication List with Changes/Refills   New Medications    AZITHROMYCIN (ZITHROMAX) 500 MG TABLET    Take 1 tablet (500 mg total) by mouth once daily.    PREDNISONE (DELTASONE) 50 MG TAB    Take 1 tablet (50 mg total) by mouth once daily.   Current Medications    BENZONATATE (TESSALON) 100 MG CAPSULE    Take 1 capsule (100 mg total) by mouth 3 (three) times daily as needed.    CARVEDILOL (COREG) 12.5 MG TABLET    TAKE ONE TABLET BY MOUTH TWICE DAILY WITH food.    CLONAZEPAM (KLONOPIN) 1 MG TABLET    Take 1 tablet (1 mg total) by mouth 2 (two) times daily.    DULOXETINE (CYMBALTA) 30 MG CAPSULE    Take 1 capsule (30 mg total) by mouth once daily.    FLUTICASONE (FLONASE) 50 MCG/ACTUATION NASAL SPRAY    2 sprays by Each Nare route once daily.    LISINOPRIL 10 MG TABLET    Take 10 mg by mouth 2 (two) times daily.   Changed and/or Refilled Medications    Modified Medication Previous Medication    DEXTROAMPHETAMINE-AMPHETAMINE (ADDERALL) 10 MG TAB dextroamphetamine-amphetamine (ADDERALL) 10 mg Tab       Take 1 tablet by mouth 2 (two) times daily.    Take 1 tablet by mouth 2 (two) times daily.    GUAIFENESIN-CODEINE 100-10 MG/5 ML (TUSSI-ORGANIDIN NR)  MG/5 ML SYRUP guaifenesin-codeine 100-10 mg/5 ml (TUSSI-ORGANIDIN NR)  mg/5 mL syrup       Take 5 mLs by mouth nightly as needed for Cough or Congestion (MAY CAUSE DROWSINESS).    Take 5 mLs by mouth nightly as needed for Cough or Congestion (MAY CAUSE DROWSINESS).       Return in about 2 weeks (around 11/30/2017), or if symptoms worsen or fail to improve, for reassess acute condition.

## 2017-11-17 ENCOUNTER — LAB VISIT (OUTPATIENT)
Dept: LAB | Facility: HOSPITAL | Age: 37
End: 2017-11-17
Attending: FAMILY MEDICINE
Payer: COMMERCIAL

## 2017-11-17 DIAGNOSIS — R06.89 DECREASED BREATH SOUNDS AT LEFT LUNG BASE: ICD-10-CM

## 2017-11-17 DIAGNOSIS — Z87.74 HISTORY OF CONGENITAL HEART DEFECT: ICD-10-CM

## 2017-11-17 DIAGNOSIS — Z00.00 ANNUAL PHYSICAL EXAM: ICD-10-CM

## 2017-11-17 LAB
ALBUMIN SERPL BCP-MCNC: 3.9 G/DL
ALP SERPL-CCNC: 94 U/L
ALT SERPL W/O P-5'-P-CCNC: 22 U/L
ANION GAP SERPL CALC-SCNC: 7 MMOL/L
AST SERPL-CCNC: 17 U/L
BASOPHILS # BLD AUTO: 0.02 K/UL
BASOPHILS NFR BLD: 0.4 %
BILIRUB SERPL-MCNC: 0.4 MG/DL
BNP SERPL-MCNC: 17 PG/ML
BUN SERPL-MCNC: 11 MG/DL
CALCIUM SERPL-MCNC: 9.4 MG/DL
CHLORIDE SERPL-SCNC: 106 MMOL/L
CHOLEST SERPL-MCNC: 138 MG/DL
CHOLEST/HDLC SERPL: 3 {RATIO}
CO2 SERPL-SCNC: 29 MMOL/L
CREAT SERPL-MCNC: 0.9 MG/DL
DIFFERENTIAL METHOD: NORMAL
EOSINOPHIL # BLD AUTO: 0.1 K/UL
EOSINOPHIL NFR BLD: 1.6 %
ERYTHROCYTE [DISTWIDTH] IN BLOOD BY AUTOMATED COUNT: 13.7 %
EST. GFR  (AFRICAN AMERICAN): >60 ML/MIN/1.73 M^2
EST. GFR  (NON AFRICAN AMERICAN): >60 ML/MIN/1.73 M^2
ESTIMATED AVG GLUCOSE: 103 MG/DL
GLUCOSE SERPL-MCNC: 95 MG/DL
HBA1C MFR BLD HPLC: 5.2 %
HCT VFR BLD AUTO: 41 %
HDLC SERPL-MCNC: 46 MG/DL
HDLC SERPL: 33.3 %
HGB BLD-MCNC: 14.2 G/DL
LDLC SERPL CALC-MCNC: 73 MG/DL
LYMPHOCYTES # BLD AUTO: 1.6 K/UL
LYMPHOCYTES NFR BLD: 30.7 %
MCH RBC QN AUTO: 29.5 PG
MCHC RBC AUTO-ENTMCNC: 34.6 G/DL
MCV RBC AUTO: 85 FL
MONOCYTES # BLD AUTO: 0.5 K/UL
MONOCYTES NFR BLD: 10.6 %
NEUTROPHILS # BLD AUTO: 2.9 K/UL
NEUTROPHILS NFR BLD: 56.7 %
NONHDLC SERPL-MCNC: 92 MG/DL
PLATELET # BLD AUTO: 198 K/UL
PMV BLD AUTO: 9.8 FL
POTASSIUM SERPL-SCNC: 4.2 MMOL/L
PROT SERPL-MCNC: 7.4 G/DL
RBC # BLD AUTO: 4.82 M/UL
SODIUM SERPL-SCNC: 142 MMOL/L
TRIGL SERPL-MCNC: 95 MG/DL
TSH SERPL DL<=0.005 MIU/L-ACNC: 2.6 UIU/ML
URATE SERPL-MCNC: 7.1 MG/DL
WBC # BLD AUTO: 5.11 K/UL

## 2017-11-17 PROCEDURE — 84443 ASSAY THYROID STIM HORMONE: CPT

## 2017-11-17 PROCEDURE — 85025 COMPLETE CBC W/AUTO DIFF WBC: CPT

## 2017-11-17 PROCEDURE — 83880 ASSAY OF NATRIURETIC PEPTIDE: CPT

## 2017-11-17 PROCEDURE — 80061 LIPID PANEL: CPT

## 2017-11-17 PROCEDURE — 36415 COLL VENOUS BLD VENIPUNCTURE: CPT | Mod: PN

## 2017-11-17 PROCEDURE — 80053 COMPREHEN METABOLIC PANEL: CPT

## 2017-11-17 PROCEDURE — 84550 ASSAY OF BLOOD/URIC ACID: CPT

## 2017-11-17 PROCEDURE — 83036 HEMOGLOBIN GLYCOSYLATED A1C: CPT

## 2017-11-17 RX ORDER — PREDNISONE 50 MG/1
50 TABLET ORAL DAILY
Qty: 3 TABLET | Refills: 0 | Status: SHIPPED | OUTPATIENT
Start: 2017-11-17 | End: 2017-11-20

## 2018-01-05 ENCOUNTER — TELEPHONE (OUTPATIENT)
Dept: FAMILY MEDICINE | Facility: CLINIC | Age: 38
End: 2018-01-05

## 2018-01-05 NOTE — TELEPHONE ENCOUNTER
----- Message from Charlotte Graf sent at 1/5/2018 11:25 AM CST -----  Contact: self  Following up on form for approval for [employer]. Need authorization from doctor. Patient can be reached at [phone].        Thanks,

## 2018-02-06 ENCOUNTER — TELEPHONE (OUTPATIENT)
Dept: FAMILY MEDICINE | Facility: CLINIC | Age: 38
End: 2018-02-06

## 2018-02-06 NOTE — TELEPHONE ENCOUNTER
----- Message from Erika Jarquin sent at 2/6/2018  9:51 AM CST -----  Contact: Wife  Pt's wife states [place] & wants to know if script can be given to help with altitude change. Wife can be reached  @ [phone].

## 2018-03-28 DIAGNOSIS — F41.9 ANXIETY: ICD-10-CM

## 2018-03-28 DIAGNOSIS — F98.8 ATTENTION DEFICIT DISORDER, UNSPECIFIED HYPERACTIVITY PRESENCE: ICD-10-CM

## 2018-03-28 NOTE — TELEPHONE ENCOUNTER
----- Message from Zuri Mancuso sent at 3/28/2018 10:05 AM CDT -----  Contact: Garth [phone]  REFILL: clonazePAM (KLONOPIN) 1 MG tablet, dextroamphetamine-amphetamine (ADDERALL) 10 mg Tab    PHARMACY: [facility] - [place] [id number]

## 2018-03-29 RX ORDER — CLONAZEPAM 1 MG/1
1 TABLET ORAL 2 TIMES DAILY
Qty: 60 TABLET | Refills: 0 | OUTPATIENT
Start: 2018-03-29

## 2018-03-29 RX ORDER — DEXTROAMPHETAMINE SACCHARATE, AMPHETAMINE ASPARTATE, DEXTROAMPHETAMINE SULFATE AND AMPHETAMINE SULFATE 2.5; 2.5; 2.5; 2.5 MG/1; MG/1; MG/1; MG/1
1 TABLET ORAL 2 TIMES DAILY
Qty: 60 TABLET | Refills: 0 | OUTPATIENT
Start: 2018-03-29

## 2018-04-02 ENCOUNTER — OFFICE VISIT (OUTPATIENT)
Dept: FAMILY MEDICINE | Facility: CLINIC | Age: 38
End: 2018-04-02
Payer: COMMERCIAL

## 2018-04-02 VITALS
HEIGHT: 68 IN | SYSTOLIC BLOOD PRESSURE: 120 MMHG | OXYGEN SATURATION: 97 % | BODY MASS INDEX: 36.42 KG/M2 | DIASTOLIC BLOOD PRESSURE: 60 MMHG | WEIGHT: 240.31 LBS | HEART RATE: 92 BPM | TEMPERATURE: 98 F

## 2018-04-02 DIAGNOSIS — F98.8 ATTENTION DEFICIT DISORDER, UNSPECIFIED HYPERACTIVITY PRESENCE: ICD-10-CM

## 2018-04-02 DIAGNOSIS — F41.9 ANXIETY: ICD-10-CM

## 2018-04-02 PROCEDURE — 3074F SYST BP LT 130 MM HG: CPT | Mod: CPTII,S$GLB,, | Performed by: FAMILY MEDICINE

## 2018-04-02 PROCEDURE — 99999 PR PBB SHADOW E&M-EST. PATIENT-LVL IV: CPT | Mod: PBBFAC,,, | Performed by: FAMILY MEDICINE

## 2018-04-02 PROCEDURE — 99214 OFFICE O/P EST MOD 30 MIN: CPT | Mod: S$GLB,,, | Performed by: FAMILY MEDICINE

## 2018-04-02 PROCEDURE — 3078F DIAST BP <80 MM HG: CPT | Mod: CPTII,S$GLB,, | Performed by: FAMILY MEDICINE

## 2018-04-02 RX ORDER — CLONAZEPAM 1 MG/1
1 TABLET ORAL 2 TIMES DAILY
Qty: 60 TABLET | Refills: 2 | Status: SHIPPED | OUTPATIENT
Start: 2018-04-02 | End: 2018-11-05 | Stop reason: SDUPTHER

## 2018-04-02 RX ORDER — DEXTROAMPHETAMINE SACCHARATE, AMPHETAMINE ASPARTATE, DEXTROAMPHETAMINE SULFATE AND AMPHETAMINE SULFATE 2.5; 2.5; 2.5; 2.5 MG/1; MG/1; MG/1; MG/1
1 TABLET ORAL 2 TIMES DAILY
Qty: 60 TABLET | Refills: 0 | Status: CANCELLED | OUTPATIENT
Start: 2018-04-02 | End: 2018-05-02

## 2018-04-02 RX ORDER — DEXTROAMPHETAMINE SACCHARATE, AMPHETAMINE ASPARTATE, DEXTROAMPHETAMINE SULFATE AND AMPHETAMINE SULFATE 2.5; 2.5; 2.5; 2.5 MG/1; MG/1; MG/1; MG/1
1 TABLET ORAL 2 TIMES DAILY
Qty: 60 TABLET | Refills: 0 | Status: SHIPPED | OUTPATIENT
Start: 2018-04-30 | End: 2018-05-30

## 2018-04-02 RX ORDER — DEXTROAMPHETAMINE SACCHARATE, AMPHETAMINE ASPARTATE, DEXTROAMPHETAMINE SULFATE AND AMPHETAMINE SULFATE 2.5; 2.5; 2.5; 2.5 MG/1; MG/1; MG/1; MG/1
1 TABLET ORAL 2 TIMES DAILY
Qty: 60 TABLET | Refills: 0 | Status: SHIPPED | OUTPATIENT
Start: 2018-04-02 | End: 2018-05-02

## 2018-04-02 RX ORDER — CLONAZEPAM 1 MG/1
1 TABLET ORAL 2 TIMES DAILY
Qty: 60 TABLET | Refills: 0 | Status: CANCELLED | OUTPATIENT
Start: 2018-04-02

## 2018-04-02 RX ORDER — DEXTROAMPHETAMINE SACCHARATE, AMPHETAMINE ASPARTATE, DEXTROAMPHETAMINE SULFATE AND AMPHETAMINE SULFATE 2.5; 2.5; 2.5; 2.5 MG/1; MG/1; MG/1; MG/1
1 TABLET ORAL 2 TIMES DAILY
Qty: 60 TABLET | Refills: 0 | Status: SHIPPED | OUTPATIENT
Start: 2018-05-27 | End: 2018-07-28 | Stop reason: SDUPTHER

## 2018-04-02 NOTE — PROGRESS NOTES
Chief Complaint   Patient presents with    Medication Refill       SUBJECTIVE:  Garth Tejeda is a 37 y.o. male here for follow up of stimulant, doing well no side effects.  Good efficacy.  Currently has co-morbidities including per problem list.      Social History   Substance Use Topics    Smoking status: Never Smoker    Smokeless tobacco: Never Used    Alcohol use Yes      Comment: socially        Patient Active Problem List    Diagnosis Date Noted    Severe obesity (BMI 35.0-39.9) with comorbidity 11/08/2017    Anxiety 02/22/2017     Long standing  Seen by psych, no admissions  effexor was weight gain  Ativan PRN      ADD (attention deficit disorder) 02/22/2017    Essential hypertension 02/22/2017    Chest pain, atypical     Left subclavian artery occlusion 03/28/2016     Secondary to muscle impingement, has collateral flow now, just monitoring      History of congenital heart defect 12/31/2015    Diminished pulse 12/31/2015     LEFT UPPER EXTREMITY         ROS  Per HPI  Patient denies any exertional chest pain, dyspnea, palpitations, syncope, orthopnea, edema or paroxysmal nocturnal dyspnea.    OBJECTIVE:  There were no vitals taken for this visit.    Wt Readings from Last 3 Encounters:   11/16/17 113.6 kg (250 lb 7.1 oz)   11/08/17 113.3 kg (249 lb 12.5 oz)   02/22/17 110 kg (242 lb 8.1 oz)     BP Readings from Last 3 Encounters:   11/16/17 106/70   11/08/17 112/80   02/22/17 (!) 98/50       He appears well, in no apparent distress.  Alert and oriented times three, pleasant and cooperative. Vital signs are as documented in vital signs section.  S1 and S2 normal, no murmurs, clicks, gallops or rubs. Regular rate and rhythm. Chest is clear; no wheezes or rales. No edema or JVD.  Chronic no pulse noted with the occlusion  No new sigsn    Review of old Records:  reviewe     Review of old labs:    Lab Results   Component Value Date    TSH 2.605 11/17/2017     Lab Results   Component Value Date     WBC 5.11 11/17/2017    HGB 14.2 11/17/2017    HCT 41.0 11/17/2017    MCV 85 11/17/2017     11/17/2017       Chemistry        Component Value Date/Time     11/17/2017 0741    K 4.2 11/17/2017 0741     11/17/2017 0741    CO2 29 11/17/2017 0741    BUN 11 11/17/2017 0741    CREATININE 0.9 11/17/2017 0741    GLU 95 11/17/2017 0741        Component Value Date/Time    CALCIUM 9.4 11/17/2017 0741    ALKPHOS 94 11/17/2017 0741    AST 17 11/17/2017 0741    ALT 22 11/17/2017 0741    BILITOT 0.4 11/17/2017 0741    ESTGFRAFRICA >60 11/17/2017 0741    EGFRNONAA >60 11/17/2017 0741        Lab Results   Component Value Date    CHOL 138 11/17/2017    CHOL 161 12/31/2015     Lab Results   Component Value Date    HDL 46 11/17/2017    HDL 56 12/31/2015     Lab Results   Component Value Date    LDLCALC 73.0 11/17/2017    LDLCALC 87.2 12/31/2015     Lab Results   Component Value Date    TRIG 95 11/17/2017    TRIG 89 12/31/2015     Lab Results   Component Value Date    CHOLHDL 33.3 11/17/2017    CHOLHDL 34.8 12/31/2015         Review of old imaging:  n/a      ASSESSMENT:  Problem List Items Addressed This Visit     ADD (attention deficit disorder)    Relevant Medications    dextroamphetamine-amphetamine (ADDERALL) 10 mg Tab    dextroamphetamine-amphetamine (ADDERALL) 10 mg Tab (Start on 4/30/2018)    dextroamphetamine-amphetamine (ADDERALL) 10 mg Tab (Start on 5/27/2018)          ICD-10-CM ICD-9-CM   1. Attention deficit disorder, unspecified hyperactivity presence F98.8 314.00               PLAN:       Uses medication sparingly.  He is doing well  No side effects noted.  No issues with other medication  The current medical regimen is effective;  continue present plan and medications.  High risk medication reviewed today.    Went over schedule II responsibilities, including abuse, side effects, refill restrictions, necessary visits, drug testing, protection of medication.  Patient voices understanding.    Medication  List with Changes/Refills   New Medications    DEXTROAMPHETAMINE-AMPHETAMINE (ADDERALL) 10 MG TAB    Take 1 tablet by mouth 2 (two) times daily.    DEXTROAMPHETAMINE-AMPHETAMINE (ADDERALL) 10 MG TAB    Take 1 tablet by mouth 2 (two) times daily.   Current Medications    CARVEDILOL (COREG) 12.5 MG TABLET    TAKE ONE TABLET BY MOUTH TWICE DAILY WITH food.    CLONAZEPAM (KLONOPIN) 1 MG TABLET    Take 1 tablet (1 mg total) by mouth 2 (two) times daily.    DULOXETINE (CYMBALTA) 30 MG CAPSULE    Take 1 capsule (30 mg total) by mouth once daily.    FLUTICASONE (FLONASE) 50 MCG/ACTUATION NASAL SPRAY    2 sprays by Each Nare route once daily.    LISINOPRIL 10 MG TABLET    Take 10 mg by mouth 2 (two) times daily.   Changed and/or Refilled Medications    Modified Medication Previous Medication    DEXTROAMPHETAMINE-AMPHETAMINE (ADDERALL) 10 MG TAB dextroamphetamine-amphetamine (ADDERALL) 10 mg Tab       Take 1 tablet by mouth 2 (two) times daily.    Take 1 tablet by mouth 2 (two) times daily.       No Follow-up on file.

## 2018-04-02 NOTE — TELEPHONE ENCOUNTER
----- Message from Suad De Guzman sent at 4/2/2018 12:28 PM CDT -----  Contact: Self   Med refill: Patient says he forgot to ask for this refill at appt  [phone]    clonazePAM (KLONOPIN) 1 MG tablet  Pharmacy: [facility]

## 2018-07-28 DIAGNOSIS — F98.8 ATTENTION DEFICIT DISORDER, UNSPECIFIED HYPERACTIVITY PRESENCE: ICD-10-CM

## 2018-07-29 RX ORDER — DEXTROAMPHETAMINE SACCHARATE, AMPHETAMINE ASPARTATE, DEXTROAMPHETAMINE SULFATE AND AMPHETAMINE SULFATE 2.5; 2.5; 2.5; 2.5 MG/1; MG/1; MG/1; MG/1
TABLET ORAL
Qty: 60 TABLET | Refills: 0 | Status: SHIPPED | OUTPATIENT
Start: 2018-07-29 | End: 2018-11-05 | Stop reason: SDUPTHER

## 2018-07-30 NOTE — TELEPHONE ENCOUNTER
----- Message from Ninfa Farris sent at 7/30/2018 10:17 AM CDT -----  Contact: self  Pt calling to request refill for his adderal,  Please call back at [phone]

## 2018-08-10 ENCOUNTER — OFFICE VISIT (OUTPATIENT)
Dept: FAMILY MEDICINE | Facility: CLINIC | Age: 38
End: 2018-08-10
Payer: COMMERCIAL

## 2018-08-10 VITALS
BODY MASS INDEX: 38.09 KG/M2 | TEMPERATURE: 99 F | RESPIRATION RATE: 16 BRPM | OXYGEN SATURATION: 97 % | HEIGHT: 68 IN | HEART RATE: 71 BPM | DIASTOLIC BLOOD PRESSURE: 70 MMHG | WEIGHT: 251.31 LBS | SYSTOLIC BLOOD PRESSURE: 126 MMHG

## 2018-08-10 DIAGNOSIS — Z00.00 ANNUAL PHYSICAL EXAM: Primary | ICD-10-CM

## 2018-08-10 PROCEDURE — 3078F DIAST BP <80 MM HG: CPT | Mod: CPTII,S$GLB,, | Performed by: FAMILY MEDICINE

## 2018-08-10 PROCEDURE — 99999 PR PBB SHADOW E&M-EST. PATIENT-LVL III: CPT | Mod: PBBFAC,,, | Performed by: FAMILY MEDICINE

## 2018-08-10 PROCEDURE — 99395 PREV VISIT EST AGE 18-39: CPT | Mod: S$GLB,,, | Performed by: FAMILY MEDICINE

## 2018-08-10 PROCEDURE — 3074F SYST BP LT 130 MM HG: CPT | Mod: CPTII,S$GLB,, | Performed by: FAMILY MEDICINE

## 2018-08-10 RX ORDER — DEXTROAMPHETAMINE SACCHARATE, AMPHETAMINE ASPARTATE MONOHYDRATE, DEXTROAMPHETAMINE SULFATE AND AMPHETAMINE SULFATE 5; 5; 5; 5 MG/1; MG/1; MG/1; MG/1
20 CAPSULE, EXTENDED RELEASE ORAL EVERY MORNING
Qty: 30 CAPSULE | Refills: 0 | Status: SHIPPED | OUTPATIENT
Start: 2018-08-10 | End: 2018-09-09

## 2018-08-10 NOTE — PROGRESS NOTES
"Chief Complaint   Patient presents with    Annual Exam     refills     SUBJECTIVE:   Garth Tejeda is a 37 y.o. male presenting for his annual checkup.  Current Outpatient Prescriptions   Medication Sig Dispense Refill    carvedilol (COREG) 12.5 MG tablet TAKE ONE TABLET BY MOUTH TWICE DAILY WITH food.  6    clonazePAM (KLONOPIN) 1 MG tablet Take 1 tablet (1 mg total) by mouth 2 (two) times daily. 60 tablet 2    dextroamphetamine-amphetamine 10 mg Tab TAKE ONE TABLET BY MOUTH TWICE DAILY 60 tablet 0    lisinopril 10 MG tablet Take 10 mg by mouth 2 (two) times daily.  5    dextroamphetamine-amphetamine (ADDERALL XR) 20 MG 24 hr capsule Take 1 capsule (20 mg total) by mouth every morning. 30 capsule 0    fluticasone (FLONASE) 50 mcg/actuation nasal spray 2 sprays by Each Nare route once daily. 1 Bottle 1     No current facility-administered medications for this visit.      Allergies: Patient has no known allergies.     ROS:  Feeling well. No dyspnea or chest pain on exertion. No abdominal pain, change in bowel habits, black or bloody stools. No urinary tract or prostatic symptoms. No neurological complaints.    OBJECTIVE:   The patient appears well, alert, oriented x 3, in no distress.   /70   Pulse 71   Temp 98.7 °F (37.1 °C) (Oral)   Resp 16   Ht 5' 8" (1.727 m)   Wt 114 kg (251 lb 5.2 oz)   SpO2 97%   BMI 38.21 kg/m²   ENT normal.  Neck supple. No adenopathy or thyromegaly. DEANNA. Lungs are clear, good air entry, no wheezes, rhonchi or rales. S1 and S2 normal, no murmurs, regular rate and rhythm. Abdomen is soft without tenderness, guarding, mass or organomegaly.  exam: deferred.  Extremities show no edema, normal peripheral pulses. Neurological is normal without focal findings.  Obesity noted and he has some stress and anxiety and is doing better now  ASSESSMENT:   1. Annual physical exam      No future appointments.      PLAN:   Counseled on age appropriate medical preventative services, " including age appropriate cancer screenings, over all nutritional health, need for a consistent exercise regimen and an over all push towards maintaining a vigorous and active lifestyle.  Counseled on age appropriate vaccines and discussed upcoming health care needs based on age/gender.  Spent time with patient counseling on need for a good patient/doctor relationship moving forward.  Discussed use of common OTC medications and supplements.  Discussed common dietary aids and use of caffeine and the need for good sleep hygiene and stress management.    Continue with the small dose of stimulant    Klonopin for PRN use of anxiety and stress    Beta blocker for the cardiac conditions.

## 2018-08-15 ENCOUNTER — LAB VISIT (OUTPATIENT)
Dept: LAB | Facility: HOSPITAL | Age: 38
End: 2018-08-15
Attending: FAMILY MEDICINE
Payer: COMMERCIAL

## 2018-08-15 DIAGNOSIS — Z00.00 ANNUAL PHYSICAL EXAM: ICD-10-CM

## 2018-08-15 LAB
ALBUMIN SERPL BCP-MCNC: 4.1 G/DL
ALP SERPL-CCNC: 88 U/L
ALT SERPL W/O P-5'-P-CCNC: 23 U/L
ANION GAP SERPL CALC-SCNC: 8 MMOL/L
AST SERPL-CCNC: 18 U/L
BASOPHILS # BLD AUTO: 0.01 K/UL
BASOPHILS NFR BLD: 0.2 %
BILIRUB SERPL-MCNC: 0.5 MG/DL
BUN SERPL-MCNC: 15 MG/DL
CALCIUM SERPL-MCNC: 9 MG/DL
CHLORIDE SERPL-SCNC: 106 MMOL/L
CHOLEST SERPL-MCNC: 159 MG/DL
CHOLEST/HDLC SERPL: 3.5 {RATIO}
CO2 SERPL-SCNC: 26 MMOL/L
CREAT SERPL-MCNC: 0.9 MG/DL
DIFFERENTIAL METHOD: NORMAL
EOSINOPHIL # BLD AUTO: 0.1 K/UL
EOSINOPHIL NFR BLD: 1.7 %
ERYTHROCYTE [DISTWIDTH] IN BLOOD BY AUTOMATED COUNT: 14.1 %
EST. GFR  (AFRICAN AMERICAN): >60 ML/MIN/1.73 M^2
EST. GFR  (NON AFRICAN AMERICAN): >60 ML/MIN/1.73 M^2
ESTIMATED AVG GLUCOSE: 108 MG/DL
GLUCOSE SERPL-MCNC: 89 MG/DL
HBA1C MFR BLD HPLC: 5.4 %
HCT VFR BLD AUTO: 43.1 %
HDLC SERPL-MCNC: 46 MG/DL
HDLC SERPL: 28.9 %
HGB BLD-MCNC: 14.6 G/DL
LDLC SERPL CALC-MCNC: 86.4 MG/DL
LYMPHOCYTES # BLD AUTO: 1.5 K/UL
LYMPHOCYTES NFR BLD: 30.6 %
MCH RBC QN AUTO: 29 PG
MCHC RBC AUTO-ENTMCNC: 33.9 G/DL
MCV RBC AUTO: 86 FL
MONOCYTES # BLD AUTO: 0.5 K/UL
MONOCYTES NFR BLD: 10 %
NEUTROPHILS # BLD AUTO: 2.8 K/UL
NEUTROPHILS NFR BLD: 57.5 %
NONHDLC SERPL-MCNC: 113 MG/DL
PLATELET # BLD AUTO: 210 K/UL
PMV BLD AUTO: 10.1 FL
POTASSIUM SERPL-SCNC: 4 MMOL/L
PROT SERPL-MCNC: 7.3 G/DL
RBC # BLD AUTO: 5.03 M/UL
SODIUM SERPL-SCNC: 140 MMOL/L
TRIGL SERPL-MCNC: 133 MG/DL
TSH SERPL DL<=0.005 MIU/L-ACNC: 3.68 UIU/ML
URATE SERPL-MCNC: 7.2 MG/DL
WBC # BLD AUTO: 4.81 K/UL

## 2018-08-15 PROCEDURE — 84443 ASSAY THYROID STIM HORMONE: CPT

## 2018-08-15 PROCEDURE — 86803 HEPATITIS C AB TEST: CPT

## 2018-08-15 PROCEDURE — 85025 COMPLETE CBC W/AUTO DIFF WBC: CPT

## 2018-08-15 PROCEDURE — 84550 ASSAY OF BLOOD/URIC ACID: CPT

## 2018-08-15 PROCEDURE — 83036 HEMOGLOBIN GLYCOSYLATED A1C: CPT

## 2018-08-15 PROCEDURE — 80061 LIPID PANEL: CPT

## 2018-08-15 PROCEDURE — 80053 COMPREHEN METABOLIC PANEL: CPT

## 2018-08-15 PROCEDURE — 84270 ASSAY OF SEX HORMONE GLOBUL: CPT

## 2018-08-16 LAB — HCV AB SERPL QL IA: NEGATIVE

## 2018-08-19 LAB
ALBUMIN SERPL-MCNC: 4.4 G/DL (ref 3.6–5.1)
SHBG SERPL-SCNC: 18 NMOL/L (ref 10–50)
TESTOST FREE SERPL-MCNC: 102.4 PG/ML (ref 46–224)
TESTOST SERPL-MCNC: 478 NG/DL (ref 250–1100)
TESTOSTERONE.FREE+WB SERPL-MCNC: 206.2 NG/DL (ref 110–575)

## 2018-09-12 RX ORDER — DEXTROAMPHETAMINE SACCHARATE, AMPHETAMINE ASPARTATE MONOHYDRATE, DEXTROAMPHETAMINE SULFATE AND AMPHETAMINE SULFATE 5; 5; 5; 5 MG/1; MG/1; MG/1; MG/1
CAPSULE, EXTENDED RELEASE ORAL
Qty: 30 CAPSULE | Refills: 0 | Status: SHIPPED | OUTPATIENT
Start: 2018-09-12 | End: 2018-11-05 | Stop reason: SDUPTHER

## 2018-10-30 ENCOUNTER — OFFICE VISIT (OUTPATIENT)
Dept: FAMILY MEDICINE | Facility: CLINIC | Age: 38
End: 2018-10-30
Payer: COMMERCIAL

## 2018-10-30 VITALS
HEART RATE: 76 BPM | RESPIRATION RATE: 16 BRPM | DIASTOLIC BLOOD PRESSURE: 64 MMHG | OXYGEN SATURATION: 98 % | HEIGHT: 68 IN | BODY MASS INDEX: 37.76 KG/M2 | SYSTOLIC BLOOD PRESSURE: 100 MMHG | TEMPERATURE: 98 F | WEIGHT: 249.13 LBS

## 2018-10-30 DIAGNOSIS — M71.21 BAKER CYST, RIGHT: Primary | ICD-10-CM

## 2018-10-30 PROCEDURE — 3078F DIAST BP <80 MM HG: CPT | Mod: CPTII,S$GLB,, | Performed by: PHYSICIAN ASSISTANT

## 2018-10-30 PROCEDURE — 99213 OFFICE O/P EST LOW 20 MIN: CPT | Mod: S$GLB,,, | Performed by: PHYSICIAN ASSISTANT

## 2018-10-30 PROCEDURE — 3008F BODY MASS INDEX DOCD: CPT | Mod: CPTII,S$GLB,, | Performed by: PHYSICIAN ASSISTANT

## 2018-10-30 PROCEDURE — 3074F SYST BP LT 130 MM HG: CPT | Mod: CPTII,S$GLB,, | Performed by: PHYSICIAN ASSISTANT

## 2018-10-30 PROCEDURE — 99999 PR PBB SHADOW E&M-EST. PATIENT-LVL IV: CPT | Mod: PBBFAC,,, | Performed by: PHYSICIAN ASSISTANT

## 2018-10-30 RX ORDER — ASPIRIN 81 MG/1
81 TABLET ORAL DAILY
COMMUNITY

## 2018-10-30 NOTE — PROGRESS NOTES
Subjective:       Patient ID: Garth Tejeda is a 38 y.o. male with multiple medical diagnoses as listed in the medical history and problem list that presents for fluid on back of knee (right)  .    Chief Complaint: fluid on back of knee (right)      Knee Pain    The incident occurred more than 1 week ago. The incident occurred at home. There was no injury mechanism (on your feet alot for work; he does do construction ). The pain is present in the right knee (behind ). Quality: tightness with stretch and felt pressure with walking  The pain is at a severity of 4/10. The pain is mild. The pain has been intermittent since onset. Pertinent negatives include no inability to bear weight, numbness or tingling. The symptoms are aggravated by weight bearing and movement. He has tried nothing for the symptoms.   He was concern for blood clots once he googled.  He states he had swelling behind his left knee as well but that only lasted a day and improved.      Travel 6 hours to and from [place] to work every couple days.   Not a smoker  No family history of blood clots     Review of Systems   Respiratory: Negative for cough, chest tightness, shortness of breath and wheezing.    Cardiovascular: Negative for chest pain, palpitations and leg swelling.   Musculoskeletal: Positive for joint swelling. Negative for arthralgias, gait problem, myalgias, neck pain and neck stiffness.   Neurological: Negative for tingling and numbness.         PAST MEDICAL HISTORY:  Past Medical History:   Diagnosis Date    Anxiety     Hypertension     Septal defect        SOCIAL HISTORY:  Social History     Socioeconomic History    Marital status:      Spouse name: Not on file    Number of children: Not on file    Years of education: Not on file    Highest education level: Not on file   Social Needs    Financial resource strain: Not on file    Food insecurity - worry: Not on file    Food insecurity - inability: Not on file     "Transportation needs - medical: Not on file    Transportation needs - non-medical: Not on file   Occupational History    Not on file   Tobacco Use    Smoking status: Never Smoker    Smokeless tobacco: Never Used   Substance and Sexual Activity    Alcohol use: Yes     Comment: socially     Drug use: Yes     Types: Marijuana    Sexual activity: Yes     Partners: Female   Other Topics Concern    Not on file   Social History Narrative    HAS [employer] business       ALLERGIES AND MEDICATIONS: updated and reviewed.  Review of patient's allergies indicates:  No Known Allergies  Current Outpatient Medications   Medication Sig Dispense Refill    aspirin (ECOTRIN) 81 MG EC tablet Take 81 mg by mouth once daily.      carvedilol (COREG) 12.5 MG tablet TAKE ONE TABLET BY MOUTH TWICE DAILY WITH food.  6    clonazePAM (KLONOPIN) 1 MG tablet Take 1 tablet (1 mg total) by mouth 2 (two) times daily. 60 tablet 2    dextroamphetamine-amphetamine (ADDERALL XR) 20 MG 24 hr capsule TAKE 1 CAPSULE BY MOUTH EVERY MORNING. 30 capsule 0    dextroamphetamine-amphetamine 10 mg Tab TAKE ONE TABLET BY MOUTH TWICE DAILY 60 tablet 0    lisinopril 10 MG tablet Take 10 mg by mouth 2 (two) times daily.  5    fluticasone (FLONASE) 50 mcg/actuation nasal spray 2 sprays by Each Nare route once daily. 1 Bottle 1     No current facility-administered medications for this visit.          Objective:   /64   Pulse 76   Temp 97.9 °F (36.6 °C) (Oral)   Resp 16   Ht 5' 8" (1.727 m)   Wt 113 kg (249 lb 1.9 oz)   SpO2 98%   BMI 37.88 kg/m²      Physical Exam   Constitutional: He is oriented to person, place, and time. No distress.   Cardiovascular: Normal rate and regular rhythm.   Pulmonary/Chest: Effort normal and breath sounds normal. He has no wheezes.   Musculoskeletal:        Right upper leg: Normal.        Left upper leg: Normal.        Right lower leg: Normal.        Left lower leg: Normal.        Legs:  Neurological: He is alert and " oriented to person, place, and time.           Assessment:       1. Baker cyst, right        Plan:       Baker cyst, right  Improving  Discussed home care instruction for now and future issues  Info printed and given to patient.           No Follow-up on file.

## 2018-10-30 NOTE — PATIENT INSTRUCTIONS
Bakers Cyst    You have a Bakers cyst. This is a lump or bulge in the back of your knee. It is caused when extra joint fluid flows into a small sac behind the knee. The extra fluid occurs because arthritis or a torn cartilage irritates the knee joint.  A small Bakers cyst often has no symptoms. A larger cyst can cause knee pain or a feeling of pressure behind your knee when you try to fully straighten or bend that joint. A Bakers cyst can leak, leading fluid to move down into your lower leg. This causes swelling, pain, and redness.  Treatment involves draining the extra fluid. Or medicine may be injected to reduce redness and swelling. If the extra fluid is caused by a torn cartilage, then surgery to repair the cartilage may be the best treatment option. If arthritis is the cause, and it does not get better with treatment, surgery can be done to remove the cyst.  Home care  · If you have knee pain, stay off the affected leg as much as possible until the pain eases.  · Apply an ice pack to the painful area for no more than 20 minutes. Do this every 3 to 6 hours for the first 24 to 48 hours. Keep using ice packs 3 to 4 times a day for the next few days, as needed for pain. To make an ice pack, put ice cubes in a sealed zip-lock plastic bag. Wrap the bag in a clean, thin towel or cloth. Never put ice or an ice pack directly on the skin.  · If you were given a hook-and-loop knee brace, you may open the brace to apply ice. Unless told otherwise, you may remove the brace to bathe and sleep.  · You may use over-the-counter pain medicine to control pain, unless another medicine was prescribed. Talk with your provider before using these medicines if you have chronic liver or kidney disease, or have ever had a stomach ulcer or GI (gastrointestinal) bleeding.     · If crutches or a walker have been recommended, dont bear full weight on your injured leg until you can do so without pain. Check with your provider before  returning to sports or full work duties.  Follow-up care  Follow up with your healthcare provider within 1 to 2 weeks, or as advised.  If X-rays were taken, you will be notified of any new findings that may affect your care.  When to seek medical advice  Call your healthcare provider right away if any of these occur:  · Toes or foot become swollen, cold, blue, numb or tingly  · Pain or swelling increases  · Warmth or redness appears over the knee  · Redness, swelling or pain occurs in the calf or lower leg  Date Last Reviewed: 11/20/2015  © 3529-2281 [employer]. [address] [place] [address], [place] [address]. All rights reserved. This information is not intended as a substitute for professional medical care. Always follow your healthcare professional's instructions.

## 2018-11-03 ENCOUNTER — PATIENT MESSAGE (OUTPATIENT)
Dept: FAMILY MEDICINE | Facility: CLINIC | Age: 38
End: 2018-11-03

## 2018-11-03 DIAGNOSIS — F98.8 ATTENTION DEFICIT DISORDER, UNSPECIFIED HYPERACTIVITY PRESENCE: ICD-10-CM

## 2018-11-05 ENCOUNTER — OFFICE VISIT (OUTPATIENT)
Dept: FAMILY MEDICINE | Facility: CLINIC | Age: 38
End: 2018-11-05
Payer: COMMERCIAL

## 2018-11-05 ENCOUNTER — PATIENT MESSAGE (OUTPATIENT)
Dept: FAMILY MEDICINE | Facility: CLINIC | Age: 38
End: 2018-11-05

## 2018-11-05 VITALS
WEIGHT: 250.44 LBS | TEMPERATURE: 98 F | DIASTOLIC BLOOD PRESSURE: 82 MMHG | OXYGEN SATURATION: 96 % | HEIGHT: 68 IN | SYSTOLIC BLOOD PRESSURE: 122 MMHG | BODY MASS INDEX: 37.96 KG/M2 | HEART RATE: 82 BPM

## 2018-11-05 DIAGNOSIS — F41.9 ANXIETY: ICD-10-CM

## 2018-11-05 DIAGNOSIS — F98.8 ATTENTION DEFICIT DISORDER, UNSPECIFIED HYPERACTIVITY PRESENCE: ICD-10-CM

## 2018-11-05 PROCEDURE — 99999 PR PBB SHADOW E&M-EST. PATIENT-LVL III: CPT | Mod: PBBFAC,,, | Performed by: FAMILY MEDICINE

## 2018-11-05 PROCEDURE — 3074F SYST BP LT 130 MM HG: CPT | Mod: CPTII,S$GLB,, | Performed by: FAMILY MEDICINE

## 2018-11-05 PROCEDURE — 3008F BODY MASS INDEX DOCD: CPT | Mod: CPTII,S$GLB,, | Performed by: FAMILY MEDICINE

## 2018-11-05 PROCEDURE — 3079F DIAST BP 80-89 MM HG: CPT | Mod: CPTII,S$GLB,, | Performed by: FAMILY MEDICINE

## 2018-11-05 PROCEDURE — 99215 OFFICE O/P EST HI 40 MIN: CPT | Mod: S$GLB,,, | Performed by: FAMILY MEDICINE

## 2018-11-05 RX ORDER — DEXTROAMPHETAMINE SACCHARATE, AMPHETAMINE ASPARTATE, DEXTROAMPHETAMINE SULFATE AND AMPHETAMINE SULFATE 2.5; 2.5; 2.5; 2.5 MG/1; MG/1; MG/1; MG/1
1 TABLET ORAL 2 TIMES DAILY
Qty: 60 TABLET | Refills: 0 | Status: CANCELLED | OUTPATIENT
Start: 2018-11-05 | End: 2018-12-05

## 2018-11-05 RX ORDER — DEXTROAMPHETAMINE SACCHARATE, AMPHETAMINE ASPARTATE, DEXTROAMPHETAMINE SULFATE AND AMPHETAMINE SULFATE 2.5; 2.5; 2.5; 2.5 MG/1; MG/1; MG/1; MG/1
1 TABLET ORAL 2 TIMES DAILY
Qty: 60 TABLET | Refills: 0 | Status: SHIPPED | OUTPATIENT
Start: 2019-01-01 | End: 2019-03-11 | Stop reason: SDUPTHER

## 2018-11-05 RX ORDER — DEXTROAMPHETAMINE SACCHARATE, AMPHETAMINE ASPARTATE, DEXTROAMPHETAMINE SULFATE AND AMPHETAMINE SULFATE 2.5; 2.5; 2.5; 2.5 MG/1; MG/1; MG/1; MG/1
1 TABLET ORAL 2 TIMES DAILY
Qty: 60 TABLET | Refills: 0 | Status: SHIPPED | OUTPATIENT
Start: 2018-12-03 | End: 2019-01-02

## 2018-11-05 RX ORDER — DEXTROAMPHETAMINE SACCHARATE, AMPHETAMINE ASPARTATE, DEXTROAMPHETAMINE SULFATE AND AMPHETAMINE SULFATE 2.5; 2.5; 2.5; 2.5 MG/1; MG/1; MG/1; MG/1
1 TABLET ORAL 2 TIMES DAILY
Qty: 60 TABLET | Refills: 0 | Status: SHIPPED | OUTPATIENT
Start: 2018-11-05 | End: 2018-11-05 | Stop reason: SDUPTHER

## 2018-11-05 RX ORDER — CLONAZEPAM 1 MG/1
1 TABLET ORAL 2 TIMES DAILY
Qty: 60 TABLET | Refills: 2 | Status: SHIPPED | OUTPATIENT
Start: 2018-11-05 | End: 2019-03-11 | Stop reason: SDUPTHER

## 2018-11-05 NOTE — TELEPHONE ENCOUNTER
LOV 10/30/2018  Last refill  7/29/2018    Patient requesting refill on Adderall 10 mg. Please advise.

## 2018-11-05 NOTE — PROGRESS NOTES
Chief Complaint   Patient presents with    ADHD    Follow-up    Medication Refill    Medication Dose Change     XR of adderall is lasting too long when he takes it       SUBJECTIVE:  Garth Tejeda is a 38 y.o. male here for follow up of chronic pain.   Patient has ADHD and is well controleld without drug side effects and doing well overall without any unusual symptoms or history.  Has other concerns including   Chief Complaint   Patient presents with    ADHD    Follow-up    Medication Refill    Medication Dose Change     XR of adderall is lasting too long when he takes it          Currently has co-morbidities including below problem list.      Social History     Tobacco Use    Smoking status: Never Smoker    Smokeless tobacco: Never Used   Substance Use Topics    Alcohol use: Yes     Comment: socially     Drug use: Yes     Types: Marijuana       Patient Active Problem List    Diagnosis Date Noted    Severe obesity (BMI 35.0-39.9) with comorbidity 11/08/2017    Anxiety 02/22/2017     Long standing  Seen by psych, no admissions  effexor was weight gain  Ativan PRN      ADD (attention deficit disorder) 02/22/2017    Essential hypertension 02/22/2017    Chest pain, atypical     Left subclavian artery occlusion 03/28/2016     Secondary to muscle impingement, has collateral flow now, just monitoring      History of congenital heart defect 12/31/2015    Diminished pulse 12/31/2015     LEFT UPPER EXTREMITY         Review of Systems   Constitutional: Negative.    HENT: Negative.    Eyes: Negative.    Respiratory: Negative.    Cardiovascular: Negative.    Gastrointestinal: Negative.    Genitourinary: Negative.    Musculoskeletal: Positive for myalgias. Negative for back pain, falls, joint pain and neck pain.   Skin: Negative.    Neurological: Negative.    Endo/Heme/Allergies: Negative.    Psychiatric/Behavioral: Negative.        OBJECTIVE:  /82   Pulse 82   Temp 98.1 °F (36.7 °C) (Oral)   Ht 5'  "8" (1.727 m)   Wt 113.6 kg (250 lb 7.1 oz)   SpO2 96%   BMI 38.08 kg/m²     Wt Readings from Last 3 Encounters:   11/05/18 113.6 kg (250 lb 7.1 oz)   10/30/18 113 kg (249 lb 1.9 oz)   08/10/18 114 kg (251 lb 5.2 oz)     BP Readings from Last 3 Encounters:   11/05/18 122/82   10/30/18 100/64   08/10/18 126/70       Patient is in usual state of health, alert and oriented without signs of intoxication.  No evidence on exam of illegal substance use or concerning symptoms involving abuse or intoxication (specifically evidence of IVDU, unusual bruising, slurred speech, unusual explanations).  Heart and lung exam are unremarkable.  No new focal neurological deficits noted.    Review of old Records:  Reviewed per epic and [facility]    Review of old labs:    Lab Results   Component Value Date    TSH 3.676 08/15/2018     Lab Results   Component Value Date    WBC 4.81 08/15/2018    HGB 14.6 08/15/2018    HCT 43.1 08/15/2018    MCV 86 08/15/2018     08/15/2018       Chemistry        Component Value Date/Time     08/15/2018 0725    K 4.0 08/15/2018 0725     08/15/2018 0725    CO2 26 08/15/2018 0725    BUN 15 08/15/2018 0725    CREATININE 0.9 08/15/2018 0725    GLU 89 08/15/2018 0725        Component Value Date/Time    CALCIUM 9.0 08/15/2018 0725    ALKPHOS 88 08/15/2018 0725    AST 18 08/15/2018 0725    ALT 23 08/15/2018 0725    BILITOT 0.5 08/15/2018 0725    ESTGFRAFRICA >60 08/15/2018 0725    EGFRNONAA >60 08/15/2018 0725        Lab Results   Component Value Date    CHOL 159 08/15/2018    CHOL 138 11/17/2017    CHOL 161 12/31/2015     Lab Results   Component Value Date    HDL 46 08/15/2018    HDL 46 11/17/2017    HDL 56 12/31/2015     Lab Results   Component Value Date    LDLCALC 86.4 08/15/2018    LDLCALC 73.0 11/17/2017    LDLCALC 87.2 12/31/2015     Lab Results   Component Value Date    TRIG 133 08/15/2018    TRIG 95 11/17/2017    TRIG 89 12/31/2015     Lab Results   Component Value Date    CHOLHDL 28.9 " 08/15/2018    CHOLHDL 33.3 11/17/2017    CHOLHDL 34.8 12/31/2015         Review of old imaging:  n/a    ASSESSMENT:    ICD-10-CM ICD-9-CM   1. Attention deficit disorder, unspecified hyperactivity presence F98.8 314.00   2. Anxiety F41.9 300.00       No evidence of abuse/diversion/addiction noted through history and physical, or checking the .  Risks, benefits and alternate treatments are considered and plan of care reflects that shared decision with the patient.    PLAN:    Went over schedule II responsibilities, including abuse, side effects, refill restrictions, necessary visits, drug testing, protection of medication.  Patient voices understanding.    Continue with current care.  High risk medication review completed per guidelines to insure safest use of medication.       Medication List           Accurate as of 11/5/18 10:23 AM. If you have any questions, ask your nurse or doctor.               CONTINUE taking these medications    aspirin 81 MG EC tablet  Commonly known as:  ECOTRIN     carvedilol 12.5 MG tablet  Commonly known as:  COREG     clonazePAM 1 MG tablet  Commonly known as:  KLONOPIN  Take 1 tablet (1 mg total) by mouth 2 (two) times daily.     dextroamphetamine-amphetamine 10 mg Tab  Take 1 tablet (10 mg total) by mouth 2 (two) times daily.     fluticasone 50 mcg/actuation nasal spray  Commonly known as:  FLONASE  2 sprays by Each Nare route once daily.     lisinopril 10 MG tablet           Where to Get Your Medications      You can get these medications from any pharmacy    Bring a paper prescription for each of these medications  · dextroamphetamine-amphetamine 10 mg Tab             We reviewed our goals of care:    To keep him working    And discontinuation    Future Appointments   Date Time Provider Department Center   11/5/2018 11:15 AM Gómez Parisi MD [facility] JE[facility]NETTE Duffy        3 months or sooner as needed

## 2018-11-08 RX ORDER — CLONAZEPAM 1 MG/1
TABLET ORAL
Qty: 60 TABLET | OUTPATIENT
Start: 2018-11-08

## 2019-02-19 ENCOUNTER — TELEPHONE (OUTPATIENT)
Dept: FAMILY MEDICINE | Facility: CLINIC | Age: 39
End: 2019-02-19

## 2019-02-19 DIAGNOSIS — Z20.828 EXPOSURE TO THE FLU: Primary | ICD-10-CM

## 2019-02-19 RX ORDER — OSELTAMIVIR PHOSPHATE 75 MG/1
75 CAPSULE ORAL DAILY
Qty: 7 CAPSULE | Refills: 0 | Status: SHIPPED | OUTPATIENT
Start: 2019-02-19 | End: 2019-02-26

## 2019-02-19 NOTE — TELEPHONE ENCOUNTER
Brigida/patient's wife requesting script for Tamiflu, due to patient's daughter being diagnosed with flu.  Please advise.

## 2019-02-28 ENCOUNTER — PATIENT MESSAGE (OUTPATIENT)
Dept: FAMILY MEDICINE | Facility: CLINIC | Age: 39
End: 2019-02-28

## 2019-02-28 DIAGNOSIS — F98.8 ATTENTION DEFICIT DISORDER, UNSPECIFIED HYPERACTIVITY PRESENCE: ICD-10-CM

## 2019-02-28 DIAGNOSIS — F41.9 ANXIETY: ICD-10-CM

## 2019-03-01 RX ORDER — DEXTROAMPHETAMINE SACCHARATE, AMPHETAMINE ASPARTATE, DEXTROAMPHETAMINE SULFATE AND AMPHETAMINE SULFATE 2.5; 2.5; 2.5; 2.5 MG/1; MG/1; MG/1; MG/1
1 TABLET ORAL 2 TIMES DAILY
Qty: 60 TABLET | Refills: 0 | Status: CANCELLED | OUTPATIENT
Start: 2019-03-01 | End: 2019-03-31

## 2019-03-01 RX ORDER — CLONAZEPAM 1 MG/1
1 TABLET ORAL 2 TIMES DAILY
Qty: 60 TABLET | Refills: 2 | Status: CANCELLED | OUTPATIENT
Start: 2019-03-01

## 2019-03-06 ENCOUNTER — PATIENT MESSAGE (OUTPATIENT)
Dept: FAMILY MEDICINE | Facility: CLINIC | Age: 39
End: 2019-03-06

## 2019-03-11 ENCOUNTER — OFFICE VISIT (OUTPATIENT)
Dept: FAMILY MEDICINE | Facility: CLINIC | Age: 39
End: 2019-03-11
Payer: COMMERCIAL

## 2019-03-11 VITALS
WEIGHT: 249.13 LBS | HEART RATE: 87 BPM | SYSTOLIC BLOOD PRESSURE: 108 MMHG | BODY MASS INDEX: 37.76 KG/M2 | OXYGEN SATURATION: 96 % | DIASTOLIC BLOOD PRESSURE: 60 MMHG | TEMPERATURE: 98 F | HEIGHT: 68 IN

## 2019-03-11 DIAGNOSIS — F41.9 ANXIETY: ICD-10-CM

## 2019-03-11 DIAGNOSIS — F98.8 ATTENTION DEFICIT DISORDER, UNSPECIFIED HYPERACTIVITY PRESENCE: ICD-10-CM

## 2019-03-11 DIAGNOSIS — Z87.74 HISTORY OF CONGENITAL HEART DEFECT: ICD-10-CM

## 2019-03-11 DIAGNOSIS — I70.8 LEFT SUBCLAVIAN ARTERY OCCLUSION: Primary | ICD-10-CM

## 2019-03-11 PROCEDURE — 99999 PR PBB SHADOW E&M-EST. PATIENT-LVL III: CPT | Mod: PBBFAC,,, | Performed by: FAMILY MEDICINE

## 2019-03-11 PROCEDURE — 3008F BODY MASS INDEX DOCD: CPT | Mod: CPTII,S$GLB,, | Performed by: FAMILY MEDICINE

## 2019-03-11 PROCEDURE — 99215 OFFICE O/P EST HI 40 MIN: CPT | Mod: S$GLB,,, | Performed by: FAMILY MEDICINE

## 2019-03-11 PROCEDURE — 3078F PR MOST RECENT DIASTOLIC BLOOD PRESSURE < 80 MM HG: ICD-10-PCS | Mod: CPTII,S$GLB,, | Performed by: FAMILY MEDICINE

## 2019-03-11 PROCEDURE — 99999 PR PBB SHADOW E&M-EST. PATIENT-LVL III: ICD-10-PCS | Mod: PBBFAC,,, | Performed by: FAMILY MEDICINE

## 2019-03-11 PROCEDURE — 99215 PR OFFICE/OUTPT VISIT, EST, LEVL V, 40-54 MIN: ICD-10-PCS | Mod: S$GLB,,, | Performed by: FAMILY MEDICINE

## 2019-03-11 PROCEDURE — 3074F PR MOST RECENT SYSTOLIC BLOOD PRESSURE < 130 MM HG: ICD-10-PCS | Mod: CPTII,S$GLB,, | Performed by: FAMILY MEDICINE

## 2019-03-11 PROCEDURE — 3078F DIAST BP <80 MM HG: CPT | Mod: CPTII,S$GLB,, | Performed by: FAMILY MEDICINE

## 2019-03-11 PROCEDURE — 3008F PR BODY MASS INDEX (BMI) DOCUMENTED: ICD-10-PCS | Mod: CPTII,S$GLB,, | Performed by: FAMILY MEDICINE

## 2019-03-11 PROCEDURE — 3074F SYST BP LT 130 MM HG: CPT | Mod: CPTII,S$GLB,, | Performed by: FAMILY MEDICINE

## 2019-03-11 RX ORDER — DEXTROAMPHETAMINE SACCHARATE, AMPHETAMINE ASPARTATE, DEXTROAMPHETAMINE SULFATE AND AMPHETAMINE SULFATE 2.5; 2.5; 2.5; 2.5 MG/1; MG/1; MG/1; MG/1
1 TABLET ORAL 2 TIMES DAILY
Qty: 60 TABLET | Refills: 0 | Status: SHIPPED | OUTPATIENT
Start: 2019-05-09 | End: 2019-09-23 | Stop reason: SDUPTHER

## 2019-03-11 RX ORDER — CARVEDILOL 12.5 MG/1
12.5 TABLET ORAL 2 TIMES DAILY WITH MEALS
Qty: 180 TABLET | Refills: 3 | Status: SHIPPED | OUTPATIENT
Start: 2019-03-11 | End: 2020-01-15

## 2019-03-11 RX ORDER — DEXTROAMPHETAMINE SACCHARATE, AMPHETAMINE ASPARTATE, DEXTROAMPHETAMINE SULFATE AND AMPHETAMINE SULFATE 2.5; 2.5; 2.5; 2.5 MG/1; MG/1; MG/1; MG/1
1 TABLET ORAL 2 TIMES DAILY
Qty: 60 TABLET | Refills: 0 | Status: SHIPPED | OUTPATIENT
Start: 2019-04-10 | End: 2019-05-10

## 2019-03-11 RX ORDER — LISINOPRIL 10 MG/1
10 TABLET ORAL 2 TIMES DAILY
Qty: 180 TABLET | Refills: 3 | Status: SHIPPED | OUTPATIENT
Start: 2019-03-11 | End: 2020-04-17 | Stop reason: SDUPTHER

## 2019-03-11 RX ORDER — DEXTROAMPHETAMINE SACCHARATE, AMPHETAMINE ASPARTATE, DEXTROAMPHETAMINE SULFATE AND AMPHETAMINE SULFATE 2.5; 2.5; 2.5; 2.5 MG/1; MG/1; MG/1; MG/1
1 TABLET ORAL 2 TIMES DAILY
Qty: 60 TABLET | Refills: 0 | Status: SHIPPED | OUTPATIENT
Start: 2019-03-11 | End: 2019-04-10

## 2019-03-11 RX ORDER — CLONAZEPAM 1 MG/1
1 TABLET ORAL 2 TIMES DAILY
Qty: 60 TABLET | Refills: 2 | Status: SHIPPED | OUTPATIENT
Start: 2019-03-11 | End: 2019-09-23 | Stop reason: SDUPTHER

## 2019-03-11 NOTE — PROGRESS NOTES
"Chief Complaint   Patient presents with    Medication Refill       SUBJECTIVE:  Garth Tejeda is a 38 y.o. male here for follow up of chronic pain.   Patient has ADHD and is well controleld without drug side effects and doing well overall without any unusual symptoms or history.  Has other concerns including   Chief Complaint   Patient presents with    Medication Refill          Currently has co-morbidities including below problem list.      Social History     Tobacco Use    Smoking status: Never Smoker    Smokeless tobacco: Never Used   Substance Use Topics    Alcohol use: Yes     Comment: socially     Drug use: Yes     Types: Marijuana       Patient Active Problem List    Diagnosis Date Noted    Severe obesity (BMI 35.0-39.9) with comorbidity 11/08/2017    Anxiety 02/22/2017     Long standing  Seen by psych, no admissions  effexor was weight gain  Ativan PRN      ADD (attention deficit disorder) 02/22/2017    Essential hypertension 02/22/2017    Chest pain, atypical     Left subclavian artery occlusion 03/28/2016     Secondary to muscle impingement, has collateral flow now, just monitoring      History of congenital heart defect 12/31/2015    Diminished pulse 12/31/2015     LEFT UPPER EXTREMITY         ROS    OBJECTIVE:  /60   Pulse 87   Temp 98.1 °F (36.7 °C) (Oral)   Ht 5' 8" (1.727 m)   Wt 113 kg (249 lb 1.9 oz)   SpO2 96%   BMI 37.88 kg/m²     Wt Readings from Last 3 Encounters:   03/11/19 113 kg (249 lb 1.9 oz)   11/05/18 113.6 kg (250 lb 7.1 oz)   10/30/18 113 kg (249 lb 1.9 oz)     BP Readings from Last 3 Encounters:   03/11/19 108/60   11/05/18 122/82   10/30/18 100/64       Patient is in usual state of health, alert and oriented without signs of intoxication.  No evidence on exam of illegal substance use or concerning symptoms involving abuse or intoxication (specifically evidence of IVDU, unusual bruising, slurred speech, unusual explanations).  Heart and lung exam are " unremarkable.  No new focal neurological deficits noted.    Review of old Records:  Reviewed per epic and     Review of old labs:    Lab Results   Component Value Date    TSH 3.676 08/15/2018     Lab Results   Component Value Date    WBC 4.81 08/15/2018    HGB 14.6 08/15/2018    HCT 43.1 08/15/2018    MCV 86 08/15/2018     08/15/2018       Chemistry        Component Value Date/Time     08/15/2018 0725    K 4.0 08/15/2018 0725     08/15/2018 0725    CO2 26 08/15/2018 0725    BUN 15 08/15/2018 0725    CREATININE 0.9 08/15/2018 0725    GLU 89 08/15/2018 0725        Component Value Date/Time    CALCIUM 9.0 08/15/2018 0725    ALKPHOS 88 08/15/2018 0725    AST 18 08/15/2018 0725    ALT 23 08/15/2018 0725    BILITOT 0.5 08/15/2018 0725    ESTGFRAFRICA >60 08/15/2018 0725    EGFRNONAA >60 08/15/2018 0725        Lab Results   Component Value Date    CHOL 159 08/15/2018    CHOL 138 11/17/2017    CHOL 161 12/31/2015     Lab Results   Component Value Date    HDL 46 08/15/2018    HDL 46 11/17/2017    HDL 56 12/31/2015     Lab Results   Component Value Date    LDLCALC 86.4 08/15/2018    LDLCALC 73.0 11/17/2017    LDLCALC 87.2 12/31/2015     Lab Results   Component Value Date    TRIG 133 08/15/2018    TRIG 95 11/17/2017    TRIG 89 12/31/2015     Lab Results   Component Value Date    CHOLHDL 28.9 08/15/2018    CHOLHDL 33.3 11/17/2017    CHOLHDL 34.8 12/31/2015         Review of old imaging:  n/a    ASSESSMENT:    ICD-10-CM ICD-9-CM   1. Left subclavian artery occlusion I70.8 444.89   2. Attention deficit disorder, unspecified hyperactivity presence F98.8 314.00   3. Anxiety F41.9 300.00   4. History of congenital heart defect Z87.74 V13.65       No evidence of abuse/diversion/addiction noted through history and physical, or checking the .  Risks, benefits and alternate treatments are considered and plan of care reflects that shared decision with the patient.  Went over schedule II responsibilities,  including abuse, side effects, refill restrictions, necessary visits, drug testing, protection of medication.  Patient voices understanding.      PLAN:      Continue with current care     Monitor cardiology      High risk medication review completed per guidelines to insure safest use of medication.      We reviewed our goals of care:    Keep him working    And discontinuation      If side effects.    No future appointments.  Medication List with Changes/Refills   Current Medications    ASPIRIN (ECOTRIN) 81 MG EC TABLET    Take 81 mg by mouth once daily.    CARVEDILOL (COREG) 12.5 MG TABLET    TAKE ONE TABLET BY MOUTH TWICE DAILY WITH food.    CLONAZEPAM (KLONOPIN) 1 MG TABLET    Take 1 tablet (1 mg total) by mouth 2 (two) times daily.    DEXTROAMPHETAMINE-AMPHETAMINE 10 MG TAB    Take 1 tablet (10 mg total) by mouth 2 (two) times daily.    FLUTICASONE (FLONASE) 50 MCG/ACTUATION NASAL SPRAY    2 sprays by Each Nare route once daily.    LISINOPRIL 10 MG TABLET    Take 10 mg by mouth 2 (two) times daily.      or sooner as needed

## 2019-03-26 ENCOUNTER — HOSPITAL ENCOUNTER (EMERGENCY)
Facility: HOSPITAL | Age: 39
Discharge: HOME | End: 2019-03-26
Attending: EMERGENCY MEDICINE
Payer: COMMERCIAL

## 2019-03-26 VITALS
OXYGEN SATURATION: 95 % | HEART RATE: 74 BPM | WEIGHT: 240 LBS | TEMPERATURE: 98 F | HEIGHT: 68 IN | RESPIRATION RATE: 18 BRPM | BODY MASS INDEX: 36.37 KG/M2 | SYSTOLIC BLOOD PRESSURE: 130 MMHG | DIASTOLIC BLOOD PRESSURE: 74 MMHG

## 2019-03-26 DIAGNOSIS — R04.2 HEMOPTYSIS: ICD-10-CM

## 2019-03-26 LAB
ALBUMIN SERPL BCP-MCNC: 4.2 G/DL (ref 3.5–5.2)
ALP SERPL-CCNC: 87 U/L (ref 55–135)
ALT SERPL W/O P-5'-P-CCNC: 27 U/L (ref 10–44)
ANION GAP SERPL CALC-SCNC: 7 MMOL/L (ref 8–16)
AST SERPL-CCNC: 20 U/L (ref 10–40)
BASOPHILS # BLD AUTO: 0.02 K/UL (ref 0–0.2)
BASOPHILS NFR BLD: 0.4 % (ref 0–1.9)
BILIRUB SERPL-MCNC: 0.4 MG/DL (ref 0.1–1)
BUN SERPL-MCNC: 14 MG/DL (ref 6–20)
CALCIUM SERPL-MCNC: 9.5 MG/DL (ref 8.7–10.5)
CHLORIDE SERPL-SCNC: 107 MMOL/L (ref 95–110)
CO2 SERPL-SCNC: 28 MMOL/L (ref 23–29)
CREAT SERPL-MCNC: 1 MG/DL (ref 0.5–1.4)
DIFFERENTIAL METHOD: NORMAL
EOSINOPHIL # BLD AUTO: 0.1 K/UL (ref 0–0.5)
EOSINOPHIL NFR BLD: 1.6 % (ref 0–8)
ERYTHROCYTE [DISTWIDTH] IN BLOOD BY AUTOMATED COUNT: 13.6 % (ref 11.5–14.5)
EST. GFR  (AFRICAN AMERICAN): >60 ML/MIN/1.73 M^2
EST. GFR  (NON AFRICAN AMERICAN): >60 ML/MIN/1.73 M^2
GLUCOSE SERPL-MCNC: 100 MG/DL (ref 70–110)
HCT VFR BLD AUTO: 43.8 % (ref 40–54)
HGB BLD-MCNC: 14.5 G/DL (ref 14–18)
LYMPHOCYTES # BLD AUTO: 2.2 K/UL (ref 1–4.8)
LYMPHOCYTES NFR BLD: 39.5 % (ref 18–48)
MCH RBC QN AUTO: 28.8 PG (ref 27–31)
MCHC RBC AUTO-ENTMCNC: 33.1 G/DL (ref 32–36)
MCV RBC AUTO: 87 FL (ref 82–98)
MONOCYTES # BLD AUTO: 0.6 K/UL (ref 0.3–1)
MONOCYTES NFR BLD: 11.1 % (ref 4–15)
NEUTROPHILS # BLD AUTO: 2.6 K/UL (ref 1.8–7.7)
NEUTROPHILS NFR BLD: 47.4 % (ref 38–73)
PLATELET # BLD AUTO: 222 K/UL (ref 150–350)
PMV BLD AUTO: 9.6 FL (ref 9.2–12.9)
POTASSIUM SERPL-SCNC: 3.8 MMOL/L (ref 3.5–5.1)
PROT SERPL-MCNC: 7.4 G/DL (ref 6–8.4)
RBC # BLD AUTO: 5.04 M/UL (ref 4.6–6.2)
SODIUM SERPL-SCNC: 142 MMOL/L (ref 136–145)
WBC # BLD AUTO: 5.49 K/UL (ref 3.9–12.7)

## 2019-03-26 PROCEDURE — 99284 EMERGENCY DEPT VISIT MOD MDM: CPT | Mod: 25

## 2019-03-26 PROCEDURE — 80053 COMPREHEN METABOLIC PANEL: CPT

## 2019-03-26 PROCEDURE — 85025 COMPLETE CBC W/AUTO DIFF WBC: CPT

## 2019-03-26 NOTE — ED TRIAGE NOTES
Pt states was asleep and woke up feeling like something dripping,coughed up blood with sputum. Denies vomiting prior to arrival. States vomited approx 10 times on Saturday after drinking too much alcohol with severe body cramps. Denies this happening in the past. Denies diarrhea

## 2019-03-26 NOTE — DISCHARGE INSTRUCTIONS
Return for weakness, lightheadedness, repeated episodes of coughing up bright red liquid blood, chest pain.  Return to the Emergency department for any worsening or failure to improve, otherwise follow up with your primary care provider.

## 2019-03-26 NOTE — ED PROVIDER NOTES
"Encounter Date: 3/26/2019       History     Chief Complaint   Patient presents with    Cough     pt reports coughing up "red blood" mixed with clear sputum about 4 times tonight; pt reports being hungover and vomited 10+ times on Saturday; pt denies any other symptoms at this time     Chief complaint:  Cough    History of present illness:  Patient is a 38-year-old male who states that 3 days ago he went out drinking and awoke the next day to have profuse and forceful vomiting all day long.  He states that tonight he was asleep and felt associated clear his throat frequently and awoke to cough and expectorated a large red blood clot.  He denies feeling weak or lightheaded.  Denies shortness of breath or chest pain. Has taken no medications or treatments for this issue.  There is currently 0 pain.    The history is provided by the patient and medical records. No  was used.     Review of patient's allergies indicates:  No Known Allergies  Past Medical History:   Diagnosis Date    Anxiety     Hypertension     Septal defect      Past Surgical History:   Procedure Laterality Date    CARDIAC SURGERY      OPEN REDUCTION INTERNAL FIXATION- DISTAL RADIUS Right 4/5/2015    Performed by Keron Rebollar III, MD at [facility] OR    WRIST SURGERY      right     Family History   Problem Relation Age of Onset    Diabetes Mother     Hypertension Mother      Social History     Tobacco Use    Smoking status: Never Smoker    Smokeless tobacco: Never Used   Substance Use Topics    Alcohol use: Yes     Comment: socially     Drug use: Yes     Types: Marijuana     Review of Systems   Constitutional: Negative for appetite change, chills, diaphoresis, fatigue and fever.   HENT: Negative for congestion, ear discharge, ear pain, postnasal drip, rhinorrhea, sinus pressure, sneezing, sore throat and voice change.    Eyes: Negative for discharge, itching and visual disturbance.   Respiratory: Positive for cough. Negative " for shortness of breath and wheezing.    Cardiovascular: Negative for chest pain, palpitations and leg swelling.   Gastrointestinal: Negative for abdominal pain, nausea and vomiting.   Endocrine: Negative for polydipsia, polyphagia and polyuria.   Genitourinary: Negative for difficulty urinating, discharge, dysuria, frequency, hematuria, penile pain, penile swelling and urgency.   Musculoskeletal: Negative for arthralgias and myalgias.   Skin: Negative for rash and wound.   Neurological: Negative for dizziness, seizures, syncope and weakness.   Hematological: Negative for adenopathy. Does not bruise/bleed easily.   Psychiatric/Behavioral: Negative for agitation and self-injury. The patient is not nervous/anxious.        Physical Exam     Initial Vitals [03/26/19 0207]   BP Pulse Resp Temp SpO2   (!) 148/84 83 18 98.5 °F (36.9 °C) 98 %      MAP       --         Physical Exam    Nursing note and vitals reviewed.  Constitutional: He appears well-developed and well-nourished. He is not diaphoretic. No distress. He is not intubated.   HENT:   Head: Normocephalic and atraumatic.   Right Ear: External ear normal.   Left Ear: External ear normal.   Nose: Nose normal. No epistaxis.   Mouth/Throat: Oropharynx is clear and moist.   Eyes: Pupils are equal, round, and reactive to light. Right eye exhibits no discharge. Left eye exhibits no discharge. No scleral icterus.   Neck: Normal range of motion.   Cardiovascular: Regular rhythm, S1 normal, S2 normal and normal heart sounds. Exam reveals no gallop.    No murmur heard.  Pulmonary/Chest: Effort normal and breath sounds normal. No accessory muscle usage. No apnea, no tachypnea and no bradypnea. He is not intubated. No respiratory distress. He has no decreased breath sounds. He has no wheezes. He has no rhonchi. He has no rales.   Abdominal: He exhibits no distension.   Musculoskeletal: Normal range of motion.   Neurological: He is alert and oriented to person, place, and time.    Skin: Skin is dry. Capillary refill takes less than 2 seconds.         ED Course   Procedures  Labs Reviewed   COMPREHENSIVE METABOLIC PANEL - Abnormal; Notable for the following components:       Result Value    Anion Gap 7 (*)     All other components within normal limits   CBC W/ AUTO DIFFERENTIAL          Imaging Results          X-Ray Chest PA And Lateral (Final result)  Result time 03/26/19 02:55:57    Final result by Celi Lake MD (03/26/19 02:55:57)                 Impression:      No focal consolidation.  Mild left basilar scarring.      Electronically signed by: Celi Lake  Date:    03/26/2019  Time:    02:55             Narrative:    EXAMINATION:  TWO VIEWS OF THE CHEST    CLINICAL HISTORY:  Hemoptysis    TECHNIQUE:  Two views of the chest.    COMPARISON:  11/17/2017    FINDINGS:  The cardiac silhouette is within normal limits.   There is no focal consolidation, pneumothorax, or pleural effusion.  There is mild left basilar scarring with slight asymmetrical elevation of the left hemidiaphragm.                                       APC / Resident Notes:   Initial assessment:  1 episode of clotted hemoptysis in a 38-year-old male who recently had profuse and forceful vomiting    Differential diagnosis includes Nisha-Schrader tear, Boerhaaves, other cause of hematemesis/hemoptysis    Orders:  Cbc, cmp, cxr    Following orders this patient was discussed with Dr. Escobar who agreed with my diagnostic approach.  He felt that if all was negative pt could be d/c'ed without further intervention.              ED Course as of Mar 26 0410   Tue Mar 26, 2019   0313 CBC: leukocyte count was normal, the H&H was normal. The platelet count was normal.       CBC auto differential [VC]   0324 CXR without boerhave syndrome signs or indicators, no free air noted to indicate large Nisha -Schrader Tear.   X-Ray Chest PA And Lateral [VC]   0333 The chemistry was negative for hypo-or hyper natremia, kalemia,  chloridemia, or other electrolyte abnormalities; BUN and creatinine were within normal limits indicating normal kidney function, ALT and AST were within normal limits indicating normal liver function, there was no transaminitis.        [VC]      ED Course User Index  [VC] Ross Perez DNP     Clinical Impression:       ICD-10-CM ICD-9-CM   1. Hemoptysis R04.2 786.30       Patient is discharged home in good condition to follow up with his primary care provider.  He should return for any shortness of breath, chest pain, weakness or lightheadedness, any other troublesome complaint.  He was given handouts on Nisha-Cshrader tear.  He should follow up with primary care provider soon as possible.      Disposition:   Disposition: Discharged  Condition: Stable                        Ross Perez DNP  03/26/19 0415

## 2019-03-28 ENCOUNTER — OFFICE VISIT (OUTPATIENT)
Dept: FAMILY MEDICINE | Facility: CLINIC | Age: 39
End: 2019-03-28
Payer: COMMERCIAL

## 2019-03-28 VITALS
HEART RATE: 63 BPM | TEMPERATURE: 97 F | OXYGEN SATURATION: 98 % | BODY MASS INDEX: 37.43 KG/M2 | WEIGHT: 246.94 LBS | DIASTOLIC BLOOD PRESSURE: 70 MMHG | HEIGHT: 68 IN | SYSTOLIC BLOOD PRESSURE: 100 MMHG

## 2019-03-28 DIAGNOSIS — J30.1 ALLERGIC RHINITIS DUE TO POLLEN, UNSPECIFIED SEASONALITY: Primary | ICD-10-CM

## 2019-03-28 DIAGNOSIS — Z87.898 H/O MOTION SICKNESS: ICD-10-CM

## 2019-03-28 DIAGNOSIS — R11.10 VOMITING, INTRACTABILITY OF VOMITING NOT SPECIFIED, PRESENCE OF NAUSEA NOT SPECIFIED, UNSPECIFIED VOMITING TYPE: ICD-10-CM

## 2019-03-28 PROCEDURE — 3078F DIAST BP <80 MM HG: CPT | Mod: CPTII,S$GLB,, | Performed by: FAMILY MEDICINE

## 2019-03-28 PROCEDURE — 99999 PR PBB SHADOW E&M-EST. PATIENT-LVL III: CPT | Mod: PBBFAC,,, | Performed by: FAMILY MEDICINE

## 2019-03-28 PROCEDURE — 3008F PR BODY MASS INDEX (BMI) DOCUMENTED: ICD-10-PCS | Mod: CPTII,S$GLB,, | Performed by: FAMILY MEDICINE

## 2019-03-28 PROCEDURE — 3078F PR MOST RECENT DIASTOLIC BLOOD PRESSURE < 80 MM HG: ICD-10-PCS | Mod: CPTII,S$GLB,, | Performed by: FAMILY MEDICINE

## 2019-03-28 PROCEDURE — 3074F SYST BP LT 130 MM HG: CPT | Mod: CPTII,S$GLB,, | Performed by: FAMILY MEDICINE

## 2019-03-28 PROCEDURE — 99214 PR OFFICE/OUTPT VISIT, EST, LEVL IV, 30-39 MIN: ICD-10-PCS | Mod: S$GLB,,, | Performed by: FAMILY MEDICINE

## 2019-03-28 PROCEDURE — 3074F PR MOST RECENT SYSTOLIC BLOOD PRESSURE < 130 MM HG: ICD-10-PCS | Mod: CPTII,S$GLB,, | Performed by: FAMILY MEDICINE

## 2019-03-28 PROCEDURE — 99999 PR PBB SHADOW E&M-EST. PATIENT-LVL III: ICD-10-PCS | Mod: PBBFAC,,, | Performed by: FAMILY MEDICINE

## 2019-03-28 PROCEDURE — 3008F BODY MASS INDEX DOCD: CPT | Mod: CPTII,S$GLB,, | Performed by: FAMILY MEDICINE

## 2019-03-28 PROCEDURE — 99214 OFFICE O/P EST MOD 30 MIN: CPT | Mod: S$GLB,,, | Performed by: FAMILY MEDICINE

## 2019-03-28 RX ORDER — PROMETHAZINE HYDROCHLORIDE 50 MG/1
50 TABLET ORAL EVERY 4 HOURS
Qty: 30 TABLET | Refills: 0 | Status: SHIPPED | OUTPATIENT
Start: 2019-03-28 | End: 2019-09-23 | Stop reason: SDUPTHER

## 2019-03-28 RX ORDER — AZELASTINE 1 MG/ML
1 SPRAY, METERED NASAL 2 TIMES DAILY
Qty: 30 ML | Refills: 0 | Status: SHIPPED | OUTPATIENT
Start: 2019-03-28 | End: 2019-08-05

## 2019-03-28 RX ORDER — ONDANSETRON 8 MG/1
8 TABLET, ORALLY DISINTEGRATING ORAL EVERY 12 HOURS PRN
Qty: 20 TABLET | Refills: 1 | Status: SHIPPED | OUTPATIENT
Start: 2019-03-28 | End: 2019-08-05 | Stop reason: SDUPTHER

## 2019-03-28 RX ORDER — SCOLOPAMINE TRANSDERMAL SYSTEM 1 MG/1
1 PATCH, EXTENDED RELEASE TRANSDERMAL
Qty: 4 PATCH | Refills: 0 | Status: SHIPPED | OUTPATIENT
Start: 2019-03-28 | End: 2019-08-05 | Stop reason: SDUPTHER

## 2019-03-28 NOTE — PROGRESS NOTES
Chief Complaint   Patient presents with    Follow-up     from ER        SUBJECTIVE:  Garth Tejeda is a 38 y.o. male here for new problem of colton-george tear and resolving still with some PND and he has no more hemoptysis and he is going fishing.  Currently has co-morbidities including per problem list.      Past Medical History:   Diagnosis Date    Anxiety     Hypertension     Septal defect      Past Surgical History:   Procedure Laterality Date    CARDIAC SURGERY      OPEN REDUCTION INTERNAL FIXATION- DISTAL RADIUS Right 4/5/2015    Performed by [facility] at [facility] OR    WRIST SURGERY      right     Social History     Socioeconomic History    Marital status:      Spouse name: Not on file    Number of children: Not on file    Years of education: Not on file    Highest education level: Not on file   Occupational History    Not on file   Social Needs    Financial resource strain: Not on file    Food insecurity:     Worry: Not on file     Inability: Not on file    Transportation needs:     Medical: Not on file     Non-medical: Not on file   Tobacco Use    Smoking status: Never Smoker    Smokeless tobacco: Never Used   Substance and Sexual Activity    Alcohol use: Yes     Comment: socially     Drug use: Yes     Types: Marijuana    Sexual activity: Yes     Partners: Female   Lifestyle    Physical activity:     Days per week: Not on file     Minutes per session: Not on file    Stress: Not on file   Relationships    Social connections:     Talks on phone: Not on file     Gets together: Not on file     Attends [employer] service: Not on file     Active member of club or organization: Not on file     Attends meetings of clubs or organizations: Not on file     Relationship status: Not on file    Intimate partner violence:     Fear of current or ex partner: Not on file     Emotionally abused: Not on file     Physically abused: Not on file     Forced sexual activity: Not on file  "  Other Topics Concern    Not on file   Social History Narrative    HAS [employer] business     Family History   Problem Relation Age of Onset    Diabetes Mother     Hypertension Mother      Current Outpatient Medications on File Prior to Visit   Medication Sig Dispense Refill    aspirin (ECOTRIN) 81 MG EC tablet Take 81 mg by mouth once daily.      carvedilol (COREG) 12.5 MG tablet Take 1 tablet (12.5 mg total) by mouth 2 (two) times daily with meals. 180 tablet 3    clonazePAM (KLONOPIN) 1 MG tablet Take 1 tablet (1 mg total) by mouth 2 (two) times daily. 60 tablet 2    dextroamphetamine-amphetamine 10 mg Tab Take 1 tablet (10 mg total) by mouth 2 (two) times daily. 60 tablet 0    [START ON 4/10/2019] dextroamphetamine-amphetamine 10 mg Tab Take 1 tablet (10 mg total) by mouth 2 (two) times daily. 60 tablet 0    [START ON 5/9/2019] dextroamphetamine-amphetamine 10 mg Tab Take 1 tablet (10 mg total) by mouth 2 (two) times daily. 60 tablet 0    fluticasone (FLONASE) 50 mcg/actuation nasal spray 2 sprays by Each Nare route once daily. 1 Bottle 1    lisinopril 10 MG tablet Take 1 tablet (10 mg total) by mouth 2 (two) times daily. 180 tablet 3     No current facility-administered medications on file prior to visit.      Review of patient's allergies indicates:  No Known Allergies      Review of Systems   Constitutional: Negative.    HENT: Negative.    Eyes: Negative.    Respiratory: Negative.    Cardiovascular: Negative.    Gastrointestinal: Positive for abdominal pain and vomiting. Negative for blood in stool, constipation, diarrhea, heartburn, melena and nausea.   Genitourinary: Negative.    Musculoskeletal: Negative.    Skin: Negative.    Neurological: Negative.    Endo/Heme/Allergies: Negative.    Psychiatric/Behavioral: Negative.        OBJECTIVE:  /70   Pulse 63   Temp 97 °F (36.1 °C) (Oral)   Ht 5' 8" (1.727 m)   Wt 112 kg (246 lb 14.6 oz)   SpO2 98%   BMI 37.54 kg/m²     Wt Readings from Last 3 " Encounters:   03/28/19 112 kg (246 lb 14.6 oz)   03/26/19 108.9 kg (240 lb)   03/11/19 113 kg (249 lb 1.9 oz)     BP Readings from Last 3 Encounters:   03/28/19 100/70   03/26/19 130/74   03/11/19 108/60       He appears well, in no apparent distress.  Alert and oriented times three, pleasant and cooperative. Vital signs are as documented in vital signs section.  S1 and S2 normal, no murmurs, clicks, gallops or rubs. Regular rate and rhythm. Chest is clear; no wheezes or rales. No edema or JVD.      Review of old Records:  Reviewed per epic and [facility]    Review of old labs:  Lab Results   Component Value Date    TSH 3.676 08/15/2018     Lab Results   Component Value Date    WBC 5.49 03/26/2019    HGB 14.5 03/26/2019    HCT 43.8 03/26/2019    MCV 87 03/26/2019     03/26/2019       Chemistry        Component Value Date/Time     03/26/2019 0302    K 3.8 03/26/2019 0302     03/26/2019 0302    CO2 28 03/26/2019 0302    BUN 14 03/26/2019 0302    CREATININE 1.0 03/26/2019 0302     03/26/2019 0302        Component Value Date/Time    CALCIUM 9.5 03/26/2019 0302    ALKPHOS 87 03/26/2019 0302    AST 20 03/26/2019 0302    ALT 27 03/26/2019 0302    BILITOT 0.4 03/26/2019 0302    ESTGFRAFRICA >60 03/26/2019 0302    EGFRNONAA >60 03/26/2019 0302        Lab Results   Component Value Date    CHOL 159 08/15/2018    CHOL 138 11/17/2017    CHOL 161 12/31/2015     Lab Results   Component Value Date    HDL 46 08/15/2018    HDL 46 11/17/2017    HDL 56 12/31/2015     Lab Results   Component Value Date    LDLCALC 86.4 08/15/2018    LDLCALC 73.0 11/17/2017    LDLCALC 87.2 12/31/2015     Lab Results   Component Value Date    TRIG 133 08/15/2018    TRIG 95 11/17/2017    TRIG 89 12/31/2015     Lab Results   Component Value Date    CHOLHDL 28.9 08/15/2018    CHOLHDL 33.3 11/17/2017    CHOLHDL 34.8 12/31/2015         Review of old imaging:  n/a    ASSESSMENT:  Problem List Items Addressed This Visit     None      Visit  Diagnoses     Allergic rhinitis due to pollen, unspecified seasonality    -  Primary    Relevant Medications    azelastine (ASTELIN) 137 mcg (0.1 %) nasal spray    Vomiting, intractability of vomiting not specified, presence of nausea not specified, unspecified vomiting type        Relevant Medications    ondansetron (ZOFRAN-ODT) 8 MG TbDL    promethazine (PHENERGAN) 50 MG tablet    H/O motion sickness        Relevant Medications    scopolamine (TRANSDERM-SCOP) 1.3-1.5 mg (1 mg over 3 days)          ICD-10-CM ICD-9-CM   1. Allergic rhinitis due to pollen, unspecified seasonality J30.1 477.0   2. Vomiting, intractability of vomiting not specified, presence of nausea not specified, unspecified vomiting type R11.10 787.03   3. H/O motion sickness Z87.898 V13.89         PLAN:  1. Allergic rhinitis due to pollen, unspecified seasonality  Potential medication side effects were discussed with the patient; let me know if any occur.    - azelastine (ASTELIN) 137 mcg (0.1 %) nasal spray; 1 spray (137 mcg total) by Nasal route 2 (two) times daily.  Dispense: 30 mL; Refill: 0    2. Vomiting, intractability of vomiting not specified, presence of nausea not specified, unspecified vomiting type  Potential medication side effects were discussed with the patient; let me know if any occur.    - ondansetron (ZOFRAN-ODT) 8 MG TbDL; Take 1 tablet (8 mg total) by mouth every 12 (twelve) hours as needed.  Dispense: 20 tablet; Refill: 1  - promethazine (PHENERGAN) 50 MG tablet; Take 1 tablet (50 mg total) by mouth every 4 (four) hours.  Dispense: 30 tablet; Refill: 0    3. H/O motion sickness  Potential medication side effects were discussed with the patient; let me know if any occur.    - scopolamine (TRANSDERM-SCOP) 1.3-1.5 mg (1 mg over 3 days); Place 1 patch onto the skin every 72 hours.  Dispense: 4 patch; Refill: 0      Medication List with Changes/Refills   New Medications    AZELASTINE (ASTELIN) 137 MCG (0.1 %) NASAL SPRAY    1  spray (137 mcg total) by Nasal route 2 (two) times daily.    ONDANSETRON (ZOFRAN-ODT) 8 MG TBDL    Take 1 tablet (8 mg total) by mouth every 12 (twelve) hours as needed.    PROMETHAZINE (PHENERGAN) 50 MG TABLET    Take 1 tablet (50 mg total) by mouth every 4 (four) hours.    SCOPOLAMINE (TRANSDERM-SCOP) 1.3-1.5 MG (1 MG OVER 3 DAYS)    Place 1 patch onto the skin every 72 hours.   Current Medications    ASPIRIN (ECOTRIN) 81 MG EC TABLET    Take 81 mg by mouth once daily.    CARVEDILOL (COREG) 12.5 MG TABLET    Take 1 tablet (12.5 mg total) by mouth 2 (two) times daily with meals.    CLONAZEPAM (KLONOPIN) 1 MG TABLET    Take 1 tablet (1 mg total) by mouth 2 (two) times daily.    DEXTROAMPHETAMINE-AMPHETAMINE 10 MG TAB    Take 1 tablet (10 mg total) by mouth 2 (two) times daily.    DEXTROAMPHETAMINE-AMPHETAMINE 10 MG TAB    Take 1 tablet (10 mg total) by mouth 2 (two) times daily.    DEXTROAMPHETAMINE-AMPHETAMINE 10 MG TAB    Take 1 tablet (10 mg total) by mouth 2 (two) times daily.    FLUTICASONE (FLONASE) 50 MCG/ACTUATION NASAL SPRAY    2 sprays by Each Nare route once daily.    LISINOPRIL 10 MG TABLET    Take 1 tablet (10 mg total) by mouth 2 (two) times daily.       Follow up in about 3 months (around 6/28/2019) for assess treatment plan.

## 2019-05-16 ENCOUNTER — PATIENT OUTREACH (OUTPATIENT)
Dept: ADMINISTRATIVE | Facility: HOSPITAL | Age: 39
End: 2019-05-16

## 2019-07-06 DIAGNOSIS — F98.8 ATTENTION DEFICIT DISORDER, UNSPECIFIED HYPERACTIVITY PRESENCE: ICD-10-CM

## 2019-07-09 RX ORDER — DEXTROAMPHETAMINE SACCHARATE, AMPHETAMINE ASPARTATE, DEXTROAMPHETAMINE SULFATE AND AMPHETAMINE SULFATE 2.5; 2.5; 2.5; 2.5 MG/1; MG/1; MG/1; MG/1
TABLET ORAL
Qty: 60 TABLET | OUTPATIENT
Start: 2019-07-09

## 2019-08-05 ENCOUNTER — OFFICE VISIT (OUTPATIENT)
Dept: FAMILY MEDICINE | Facility: CLINIC | Age: 39
End: 2019-08-05
Payer: COMMERCIAL

## 2019-08-05 VITALS
RESPIRATION RATE: 16 BRPM | BODY MASS INDEX: 38.32 KG/M2 | OXYGEN SATURATION: 98 % | SYSTOLIC BLOOD PRESSURE: 110 MMHG | HEART RATE: 68 BPM | WEIGHT: 252.88 LBS | DIASTOLIC BLOOD PRESSURE: 80 MMHG | TEMPERATURE: 98 F | HEIGHT: 68 IN

## 2019-08-05 DIAGNOSIS — E66.01 SEVERE OBESITY (BMI 35.0-39.9) WITH COMORBIDITY: ICD-10-CM

## 2019-08-05 DIAGNOSIS — J30.9 ALLERGIC RHINITIS, UNSPECIFIED SEASONALITY, UNSPECIFIED TRIGGER: Primary | ICD-10-CM

## 2019-08-05 DIAGNOSIS — R11.10 VOMITING, INTRACTABILITY OF VOMITING NOT SPECIFIED, PRESENCE OF NAUSEA NOT SPECIFIED, UNSPECIFIED VOMITING TYPE: ICD-10-CM

## 2019-08-05 DIAGNOSIS — Z87.898 H/O MOTION SICKNESS: ICD-10-CM

## 2019-08-05 PROCEDURE — 3008F PR BODY MASS INDEX (BMI) DOCUMENTED: ICD-10-PCS | Mod: CPTII,S$GLB,, | Performed by: PHYSICIAN ASSISTANT

## 2019-08-05 PROCEDURE — 99999 PR PBB SHADOW E&M-EST. PATIENT-LVL IV: ICD-10-PCS | Mod: PBBFAC,,, | Performed by: PHYSICIAN ASSISTANT

## 2019-08-05 PROCEDURE — 3079F DIAST BP 80-89 MM HG: CPT | Mod: CPTII,S$GLB,, | Performed by: PHYSICIAN ASSISTANT

## 2019-08-05 PROCEDURE — 3074F PR MOST RECENT SYSTOLIC BLOOD PRESSURE < 130 MM HG: ICD-10-PCS | Mod: CPTII,S$GLB,, | Performed by: PHYSICIAN ASSISTANT

## 2019-08-05 PROCEDURE — 3079F PR MOST RECENT DIASTOLIC BLOOD PRESSURE 80-89 MM HG: ICD-10-PCS | Mod: CPTII,S$GLB,, | Performed by: PHYSICIAN ASSISTANT

## 2019-08-05 PROCEDURE — 99213 OFFICE O/P EST LOW 20 MIN: CPT | Mod: S$GLB,,, | Performed by: PHYSICIAN ASSISTANT

## 2019-08-05 PROCEDURE — 99213 PR OFFICE/OUTPT VISIT, EST, LEVL III, 20-29 MIN: ICD-10-PCS | Mod: S$GLB,,, | Performed by: PHYSICIAN ASSISTANT

## 2019-08-05 PROCEDURE — 3008F BODY MASS INDEX DOCD: CPT | Mod: CPTII,S$GLB,, | Performed by: PHYSICIAN ASSISTANT

## 2019-08-05 PROCEDURE — 3074F SYST BP LT 130 MM HG: CPT | Mod: CPTII,S$GLB,, | Performed by: PHYSICIAN ASSISTANT

## 2019-08-05 PROCEDURE — 99999 PR PBB SHADOW E&M-EST. PATIENT-LVL IV: CPT | Mod: PBBFAC,,, | Performed by: PHYSICIAN ASSISTANT

## 2019-08-05 RX ORDER — FLUTICASONE PROPIONATE 50 MCG
2 SPRAY, SUSPENSION (ML) NASAL DAILY
Qty: 1 BOTTLE | Refills: 1 | Status: SHIPPED | OUTPATIENT
Start: 2019-08-05 | End: 2020-12-07

## 2019-08-05 RX ORDER — DESLORATADINE 5 MG/1
5 TABLET ORAL DAILY
Qty: 30 TABLET | Refills: 11 | Status: SHIPPED | OUTPATIENT
Start: 2019-08-05 | End: 2020-04-17 | Stop reason: SDUPTHER

## 2019-08-05 RX ORDER — ONDANSETRON 8 MG/1
8 TABLET, ORALLY DISINTEGRATING ORAL EVERY 12 HOURS PRN
Qty: 20 TABLET | Refills: 1 | Status: SHIPPED | OUTPATIENT
Start: 2019-08-05 | End: 2019-09-23 | Stop reason: SDUPTHER

## 2019-08-05 RX ORDER — SCOLOPAMINE TRANSDERMAL SYSTEM 1 MG/1
1 PATCH, EXTENDED RELEASE TRANSDERMAL
Qty: 4 PATCH | Refills: 0 | Status: SHIPPED | OUTPATIENT
Start: 2019-08-05 | End: 2019-09-23 | Stop reason: SDUPTHER

## 2019-08-05 NOTE — PROGRESS NOTES
Subjective:       Patient ID: Garth Tejeda is a 38 y.o. male with multiple medical diagnoses as listed in the medical history and problem list that presents for URI (5 days)  .    Chief Complaint: URI (5 days)      URI    This is a new problem. The current episode started in the past 7 days (5 days ). There has been no fever. Associated symptoms include congestion, coughing (phlegm ) and wheezing. Pertinent negatives include no ear pain, headaches, rhinorrhea, sinus pain, sneezing or sore throat. Treatments tried: flonase.     Review of Systems   Constitutional: Negative for chills and fever.   HENT: Positive for congestion and postnasal drip. Negative for ear pain, rhinorrhea, sinus pressure, sinus pain, sneezing, sore throat and trouble swallowing.    Eyes: Negative for pain, discharge, redness and itching.   Respiratory: Positive for cough (phlegm ) and wheezing. Negative for chest tightness and shortness of breath.         No hx of asthma  Not a smoker    Neurological: Negative for headaches.         PAST MEDICAL HISTORY:  Past Medical History:   Diagnosis Date    Anxiety     Hypertension     Septal defect        SOCIAL HISTORY:  Social History     Socioeconomic History    Marital status:      Spouse name: Not on file    Number of children: Not on file    Years of education: Not on file    Highest education level: Not on file   Occupational History    Not on file   Social Needs    Financial resource strain: Not on file    Food insecurity:     Worry: Not on file     Inability: Not on file    Transportation needs:     Medical: Not on file     Non-medical: Not on file   Tobacco Use    Smoking status: Never Smoker    Smokeless tobacco: Never Used   Substance and Sexual Activity    Alcohol use: Yes     Comment: socially     Drug use: Yes     Types: Marijuana    Sexual activity: Yes     Partners: Female   Lifestyle    Physical activity:     Days per week: Not on file     Minutes per  "session: Not on file    Stress: Not on file   Relationships    Social connections:     Talks on phone: Not on file     Gets together: Not on file     Attends Muslim service: Not on file     Active member of club or organization: Not on file     Attends meetings of clubs or organizations: Not on file     Relationship status: Not on file   Other Topics Concern    Not on file   Social History Narrative    HAS [employer] business       ALLERGIES AND MEDICATIONS: updated and reviewed.  Review of patient's allergies indicates:  No Known Allergies  Current Outpatient Medications   Medication Sig Dispense Refill    aspirin (ECOTRIN) 81 MG EC tablet Take 81 mg by mouth once daily.      carvedilol (COREG) 12.5 MG tablet Take 1 tablet (12.5 mg total) by mouth 2 (two) times daily with meals. 180 tablet 3    clonazePAM (KLONOPIN) 1 MG tablet Take 1 tablet (1 mg total) by mouth 2 (two) times daily. 60 tablet 2    dextroamphetamine-amphetamine 10 mg Tab Take 1 tablet (10 mg total) by mouth 2 (two) times daily. 60 tablet 0    fluticasone propionate (FLONASE) 50 mcg/actuation nasal spray 2 sprays (100 mcg total) by Each Nostril route once daily. 1 Bottle 1    lisinopril 10 MG tablet Take 1 tablet (10 mg total) by mouth 2 (two) times daily. 180 tablet 3    ondansetron (ZOFRAN-ODT) 8 MG TbDL Take 1 tablet (8 mg total) by mouth every 12 (twelve) hours as needed. 20 tablet 1    promethazine (PHENERGAN) 50 MG tablet Take 1 tablet (50 mg total) by mouth every 4 (four) hours. 30 tablet 0    scopolamine (TRANSDERM-SCOP) 1.3-1.5 mg (1 mg over 3 days) Place 1 patch onto the skin every 72 hours. 4 patch 0    desloratadine (CLARINEX) 5 mg tablet Take 1 tablet (5 mg total) by mouth once daily. 30 tablet 11     No current facility-administered medications for this visit.          Objective:   /80   Pulse 68   Temp 98.3 °F (36.8 °C) (Oral)   Resp 16   Ht 5' 8" (1.727 m)   Wt 114.7 kg (252 lb 13.9 oz)   SpO2 98%   BMI 38.45 " kg/m²      Physical Exam   Constitutional: He is oriented to person, place, and time. No distress.   HENT:   Head: Normocephalic and atraumatic.   Right Ear: External ear and ear canal normal. No middle ear effusion.   Left Ear: External ear and ear canal normal.  No middle ear effusion.   Nose: Mucosal edema present. No rhinorrhea. Right sinus exhibits no maxillary sinus tenderness and no frontal sinus tenderness. Left sinus exhibits no maxillary sinus tenderness and no frontal sinus tenderness.   Mouth/Throat: Uvula is midline and mucous membranes are normal. No oropharyngeal exudate or posterior oropharyngeal erythema.   PND   Eyes: Conjunctivae and EOM are normal.   Cardiovascular: Normal rate and regular rhythm.   Pulmonary/Chest: Effort normal and breath sounds normal. He has no wheezes.   Lymphadenopathy:     He has no cervical adenopathy.   Neurological: He is alert and oriented to person, place, and time.   Skin: Skin is warm. No erythema.           Assessment:       1. Allergic rhinitis, unspecified seasonality, unspecified trigger    2. H/O motion sickness    3. Vomiting, intractability of vomiting not specified, presence of nausea not specified, unspecified vomiting type    4. Severe obesity (BMI 35.0-39.9) with comorbidity        Plan:       Allergic rhinitis, unspecified seasonality, unspecified trigger  -     fluticasone propionate (FLONASE) 50 mcg/actuation nasal spray; 2 sprays (100 mcg total) by Each Nostril route once daily.  Dispense: 1 Bottle; Refill: 1  -     desloratadine (CLARINEX) 5 mg tablet; Take 1 tablet (5 mg total) by mouth once daily.  Dispense: 30 tablet; Refill: 11    H/O motion sickness  -     scopolamine (TRANSDERM-SCOP) 1.3-1.5 mg (1 mg over 3 days); Place 1 patch onto the skin every 72 hours.  Dispense: 4 patch; Refill: 0    Vomiting, intractability of vomiting not specified, presence of nausea not specified, unspecified vomiting type  -     ondansetron (ZOFRAN-ODT) 8 MG TbDL;  Take 1 tablet (8 mg total) by mouth every 12 (twelve) hours as needed.  Dispense: 20 tablet; Refill: 1    Severe obesity (BMI 35.0-39.9) with comorbidity  Increase exercise and work on cutting down on starches           No follow-ups on file.

## 2019-09-23 ENCOUNTER — OFFICE VISIT (OUTPATIENT)
Dept: FAMILY MEDICINE | Facility: CLINIC | Age: 39
End: 2019-09-23
Payer: COMMERCIAL

## 2019-09-23 VITALS
BODY MASS INDEX: 39.09 KG/M2 | HEIGHT: 68 IN | OXYGEN SATURATION: 97 % | SYSTOLIC BLOOD PRESSURE: 90 MMHG | HEART RATE: 79 BPM | WEIGHT: 257.94 LBS | TEMPERATURE: 98 F | DIASTOLIC BLOOD PRESSURE: 60 MMHG

## 2019-09-23 DIAGNOSIS — F98.8 ATTENTION DEFICIT DISORDER, UNSPECIFIED HYPERACTIVITY PRESENCE: ICD-10-CM

## 2019-09-23 DIAGNOSIS — E86.0 BODY WATER DEHYDRATION: Primary | ICD-10-CM

## 2019-09-23 DIAGNOSIS — J30.9 ALLERGIC RHINITIS, UNSPECIFIED SEASONALITY, UNSPECIFIED TRIGGER: ICD-10-CM

## 2019-09-23 DIAGNOSIS — R42 DIZZINESS: ICD-10-CM

## 2019-09-23 DIAGNOSIS — F41.9 ANXIETY: ICD-10-CM

## 2019-09-23 PROCEDURE — 3008F PR BODY MASS INDEX (BMI) DOCUMENTED: ICD-10-PCS | Mod: CPTII,S$GLB,, | Performed by: FAMILY MEDICINE

## 2019-09-23 PROCEDURE — 3008F BODY MASS INDEX DOCD: CPT | Mod: CPTII,S$GLB,, | Performed by: FAMILY MEDICINE

## 2019-09-23 PROCEDURE — 99999 PR PBB SHADOW E&M-EST. PATIENT-LVL III: ICD-10-PCS | Mod: PBBFAC,,, | Performed by: FAMILY MEDICINE

## 2019-09-23 PROCEDURE — 3078F PR MOST RECENT DIASTOLIC BLOOD PRESSURE < 80 MM HG: ICD-10-PCS | Mod: CPTII,S$GLB,, | Performed by: FAMILY MEDICINE

## 2019-09-23 PROCEDURE — 3074F PR MOST RECENT SYSTOLIC BLOOD PRESSURE < 130 MM HG: ICD-10-PCS | Mod: CPTII,S$GLB,, | Performed by: FAMILY MEDICINE

## 2019-09-23 PROCEDURE — 99215 PR OFFICE/OUTPT VISIT, EST, LEVL V, 40-54 MIN: ICD-10-PCS | Mod: S$GLB,,, | Performed by: FAMILY MEDICINE

## 2019-09-23 PROCEDURE — 99999 PR PBB SHADOW E&M-EST. PATIENT-LVL III: CPT | Mod: PBBFAC,,, | Performed by: FAMILY MEDICINE

## 2019-09-23 PROCEDURE — 3078F DIAST BP <80 MM HG: CPT | Mod: CPTII,S$GLB,, | Performed by: FAMILY MEDICINE

## 2019-09-23 PROCEDURE — 99215 OFFICE O/P EST HI 40 MIN: CPT | Mod: S$GLB,,, | Performed by: FAMILY MEDICINE

## 2019-09-23 PROCEDURE — 3074F SYST BP LT 130 MM HG: CPT | Mod: CPTII,S$GLB,, | Performed by: FAMILY MEDICINE

## 2019-09-23 RX ORDER — DEXTROAMPHETAMINE SACCHARATE, AMPHETAMINE ASPARTATE, DEXTROAMPHETAMINE SULFATE AND AMPHETAMINE SULFATE 2.5; 2.5; 2.5; 2.5 MG/1; MG/1; MG/1; MG/1
1 TABLET ORAL 2 TIMES DAILY
Qty: 60 TABLET | Refills: 0 | Status: SHIPPED | OUTPATIENT
Start: 2019-11-21 | End: 2019-12-23

## 2019-09-23 RX ORDER — DEXTROAMPHETAMINE SACCHARATE, AMPHETAMINE ASPARTATE, DEXTROAMPHETAMINE SULFATE AND AMPHETAMINE SULFATE 2.5; 2.5; 2.5; 2.5 MG/1; MG/1; MG/1; MG/1
1 TABLET ORAL 2 TIMES DAILY
Qty: 60 TABLET | Refills: 0 | Status: SHIPPED | OUTPATIENT
Start: 2019-09-23 | End: 2019-10-23

## 2019-09-23 RX ORDER — DEXTROAMPHETAMINE SACCHARATE, AMPHETAMINE ASPARTATE, DEXTROAMPHETAMINE SULFATE AND AMPHETAMINE SULFATE 2.5; 2.5; 2.5; 2.5 MG/1; MG/1; MG/1; MG/1
1 TABLET ORAL 2 TIMES DAILY
Qty: 60 TABLET | Refills: 0 | Status: SHIPPED | OUTPATIENT
Start: 2019-10-22 | End: 2019-11-21

## 2019-09-23 RX ORDER — CLONAZEPAM 1 MG/1
1 TABLET ORAL 2 TIMES DAILY
Qty: 60 TABLET | Refills: 2 | Status: SHIPPED | OUTPATIENT
Start: 2019-09-23 | End: 2019-11-21 | Stop reason: SDUPTHER

## 2019-09-23 RX ORDER — MECLIZINE HYDROCHLORIDE 25 MG/1
25 TABLET ORAL 3 TIMES DAILY PRN
Qty: 30 TABLET | Refills: 0 | Status: SHIPPED | OUTPATIENT
Start: 2019-09-23 | End: 2020-01-23 | Stop reason: SDUPTHER

## 2019-09-23 NOTE — PROGRESS NOTES
Chief Complaint   Patient presents with    Dizziness       SUBJECTIVE:  Garth Tejeda is a 39 y.o. male here for new problem of sinusitis and allergies and he works outside, off of the stimulant and he has had morning dizziness, postural with standing, better throughtout the day.  Severe in the morning.  No b/p issues no other sympotms.  He was sice prior to this and went to our office with conservative care and then UC with steroid injection and antibiotics, URI/sinusitis improved but dizziness.  Currently has co-morbidities including per problem list.      Past Medical History:   Diagnosis Date    Anxiety     Hypertension     Septal defect      Past Surgical History:   Procedure Laterality Date    CARDIAC SURGERY      WRIST SURGERY      right     Social History     Socioeconomic History    Marital status:      Spouse name: Not on file    Number of children: Not on file    Years of education: Not on file    Highest education level: Not on file   Occupational History    Not on file   Social Needs    Financial resource strain: Not on file    Food insecurity:     Worry: Not on file     Inability: Not on file    Transportation needs:     Medical: Not on file     Non-medical: Not on file   Tobacco Use    Smoking status: Never Smoker    Smokeless tobacco: Never Used   Substance and Sexual Activity    Alcohol use: Yes     Comment: socially     Drug use: Yes     Types: Marijuana    Sexual activity: Yes     Partners: Female   Lifestyle    Physical activity:     Days per week: Not on file     Minutes per session: Not on file    Stress: Not on file   Relationships    Social connections:     Talks on phone: Not on file     Gets together: Not on file     Attends Spiritism service: Not on file     Active member of club or organization: Not on file     Attends meetings of clubs or organizations: Not on file     Relationship status: Not on file   Other Topics Concern    Not on file   Social History  "Narrative    HAS [employer] business     Family History   Problem Relation Age of Onset    Diabetes Mother     Hypertension Mother      Current Outpatient Medications on File Prior to Visit   Medication Sig Dispense Refill    aspirin (ECOTRIN) 81 MG EC tablet Take 81 mg by mouth once daily.      carvedilol (COREG) 12.5 MG tablet Take 1 tablet (12.5 mg total) by mouth 2 (two) times daily with meals. 180 tablet 3    desloratadine (CLARINEX) 5 mg tablet Take 1 tablet (5 mg total) by mouth once daily. 30 tablet 11    fluticasone propionate (FLONASE) 50 mcg/actuation nasal spray 2 sprays (100 mcg total) by Each Nostril route once daily. 1 Bottle 1    lisinopril 10 MG tablet Take 1 tablet (10 mg total) by mouth 2 (two) times daily. 180 tablet 3     No current facility-administered medications on file prior to visit.      Review of patient's allergies indicates:  No Known Allergies      Review of Systems   Constitutional: Negative.    HENT: Negative.    Eyes: Negative.    Respiratory: Negative.    Cardiovascular: Negative.    Gastrointestinal: Negative.    Genitourinary: Negative.    Musculoskeletal: Negative.    Skin: Negative.    Neurological: Positive for dizziness. Negative for sensory change and headaches.   Endo/Heme/Allergies: Negative.    Psychiatric/Behavioral: Negative.        OBJECTIVE:  BP 90/60   Pulse 79   Temp 97.6 °F (36.4 °C) (Oral)   Ht 5' 8" (1.727 m)   Wt 117 kg (257 lb 15 oz)   SpO2 97%   BMI 39.22 kg/m²   Weight gain  Wt Readings from Last 3 Encounters:   09/23/19 117 kg (257 lb 15 oz)   08/05/19 114.7 kg (252 lb 13.9 oz)   03/28/19 112 kg (246 lb 14.6 oz)     BP Readings from Last 3 Encounters:   09/23/19 90/60   08/05/19 110/80   03/28/19 100/70       He appears well, in no apparent distress.  Alert and oriented times three, pleasant and cooperative. Vital signs are as documented in vital signs section.  No nystagmus  Neuro: Cranial nerves and fundi are normal. DEANNA. EOM's intact. No " papilledema. Neck supple. No bruits. Normal deep tendon reflexes.  Has dry mucus membranes  Signs of chronic dehydration  Still with cardiac findings that are unchanged on the left.    Review of old Records:  Reviewed per [facility]    Review of old labs:      Review of old imaging:      ASSESSMENT:  Problem List Items Addressed This Visit     Body water dehydration - Primary    Anxiety    Relevant Medications    clonazePAM (KLONOPIN) 1 MG tablet    ADD (attention deficit disorder)    Relevant Medications    dextroamphetamine-amphetamine 10 mg Tab    dextroamphetamine-amphetamine 10 mg Tab (Start on 10/22/2019)    dextroamphetamine-amphetamine 10 mg Tab (Start on 11/21/2019)      Other Visit Diagnoses     Dizziness        Relevant Medications    meclizine (ANTIVERT) 25 mg tablet          ICD-10-CM ICD-9-CM   1. Body water dehydration E86.0 276.51   2. Anxiety F41.9 300.00   3. Attention deficit disorder, unspecified hyperactivity presence F98.8 314.00   4. Dizziness R42 780.4       Most likely water/equilibrium issue secondary to illness, working outside and poor fluid intake with food and too much fluid intake outside of food causing dilutional issues.  Gave strategies.  Watch early mornings for syncope.  ER precautions. Given.  PLAN:  1. Body water dehydration  You do have evidence of chronic dehydration.  Drink fluids only with food as much as possible.  Avoid cold fluids where you can and treat anytime you sweat with a small salty snack (4-5 salted nuts or chips, 1-2 crackers or a bite of beef jerky) with 6-8 oz of fluid.  Repeat every 30 minutes that you continue to sweat.  Don't start stimulant until then.    2. Anxiety  The current medical regimen is effective;  continue present plan and medications.  Unlikely this is anxiety  - clonazePAM (KLONOPIN) 1 MG tablet; Take 1 tablet (1 mg total) by mouth 2 (two) times daily.  Dispense: 60 tablet; Refill: 2    3. Attention deficit disorder, unspecified hyperactivity  presence  The current medical regimen is effective;  continue present plan and medications.  No intolerable side effects.  Uses PRN  - dextroamphetamine-amphetamine 10 mg Tab; Take 1 tablet (10 mg total) by mouth 2 (two) times daily.  Dispense: 60 tablet; Refill: 0  - dextroamphetamine-amphetamine 10 mg Tab; Take 1 tablet (10 mg total) by mouth 2 (two) times daily.  Dispense: 60 tablet; Refill: 0  - dextroamphetamine-amphetamine 10 mg Tab; Take 1 tablet (10 mg total) by mouth 2 (two) times daily.  Dispense: 60 tablet; Refill: 0      Medication List with Changes/Refills   New Medications    DEXTROAMPHETAMINE-AMPHETAMINE 10 MG TAB    Take 1 tablet (10 mg total) by mouth 2 (two) times daily.    DEXTROAMPHETAMINE-AMPHETAMINE 10 MG TAB    Take 1 tablet (10 mg total) by mouth 2 (two) times daily.    MECLIZINE (ANTIVERT) 25 MG TABLET    Take 1 tablet (25 mg total) by mouth 3 (three) times daily as needed for Dizziness.   Current Medications    ASPIRIN (ECOTRIN) 81 MG EC TABLET    Take 81 mg by mouth once daily.    CARVEDILOL (COREG) 12.5 MG TABLET    Take 1 tablet (12.5 mg total) by mouth 2 (two) times daily with meals.    DESLORATADINE (CLARINEX) 5 MG TABLET    Take 1 tablet (5 mg total) by mouth once daily.    FLUTICASONE PROPIONATE (FLONASE) 50 MCG/ACTUATION NASAL SPRAY    2 sprays (100 mcg total) by Each Nostril route once daily.    LISINOPRIL 10 MG TABLET    Take 1 tablet (10 mg total) by mouth 2 (two) times daily.   Changed and/or Refilled Medications    Modified Medication Previous Medication    CLONAZEPAM (KLONOPIN) 1 MG TABLET clonazePAM (KLONOPIN) 1 MG tablet       Take 1 tablet (1 mg total) by mouth 2 (two) times daily.    Take 1 tablet (1 mg total) by mouth 2 (two) times daily.    DEXTROAMPHETAMINE-AMPHETAMINE 10 MG TAB dextroamphetamine-amphetamine 10 mg Tab       Take 1 tablet (10 mg total) by mouth 2 (two) times daily.    Take 1 tablet (10 mg total) by mouth 2 (two) times daily.   Discontinued Medications     ONDANSETRON (ZOFRAN-ODT) 8 MG TBDL    Take 1 tablet (8 mg total) by mouth every 12 (twelve) hours as needed.    PROMETHAZINE (PHENERGAN) 50 MG TABLET    Take 1 tablet (50 mg total) by mouth every 4 (four) hours.    SCOPOLAMINE (TRANSDERM-SCOP) 1.3-1.5 MG (1 MG OVER 3 DAYS)    Place 1 patch onto the skin every 72 hours.       Follow up in about 3 months (around 12/23/2019) for assess treatment plan.

## 2019-09-24 RX ORDER — FLUTICASONE PROPIONATE 50 MCG
SPRAY, SUSPENSION (ML) NASAL
Qty: 16 G | OUTPATIENT
Start: 2019-09-24

## 2019-11-21 DIAGNOSIS — F41.9 ANXIETY: ICD-10-CM

## 2019-11-21 RX ORDER — CLONAZEPAM 1 MG/1
TABLET ORAL
Qty: 60 TABLET | Refills: 1 | Status: SHIPPED | OUTPATIENT
Start: 2019-11-21 | End: 2020-01-23 | Stop reason: SDUPTHER

## 2019-12-20 DIAGNOSIS — F98.8 ATTENTION DEFICIT DISORDER, UNSPECIFIED HYPERACTIVITY PRESENCE: ICD-10-CM

## 2019-12-23 RX ORDER — DEXTROAMPHETAMINE SACCHARATE, AMPHETAMINE ASPARTATE, DEXTROAMPHETAMINE SULFATE AND AMPHETAMINE SULFATE 2.5; 2.5; 2.5; 2.5 MG/1; MG/1; MG/1; MG/1
TABLET ORAL
Qty: 60 TABLET | Refills: 0 | Status: SHIPPED | OUTPATIENT
Start: 2019-12-23 | End: 2020-01-23 | Stop reason: SDUPTHER

## 2020-01-09 ENCOUNTER — PATIENT OUTREACH (OUTPATIENT)
Dept: ADMINISTRATIVE | Facility: HOSPITAL | Age: 40
End: 2020-01-09

## 2020-01-09 DIAGNOSIS — Z13.220 SCREENING FOR HYPERLIPIDEMIA: Primary | ICD-10-CM

## 2020-01-15 DIAGNOSIS — I70.8 LEFT SUBCLAVIAN ARTERY OCCLUSION: ICD-10-CM

## 2020-01-15 DIAGNOSIS — Z87.74 HISTORY OF CONGENITAL HEART DEFECT: ICD-10-CM

## 2020-01-15 RX ORDER — CARVEDILOL 12.5 MG/1
TABLET ORAL
Qty: 180 TABLET | Refills: 3 | Status: SHIPPED | OUTPATIENT
Start: 2020-01-15 | End: 2020-12-08

## 2020-01-23 ENCOUNTER — OFFICE VISIT (OUTPATIENT)
Dept: FAMILY MEDICINE | Facility: CLINIC | Age: 40
End: 2020-01-23
Payer: COMMERCIAL

## 2020-01-23 VITALS
BODY MASS INDEX: 38.92 KG/M2 | DIASTOLIC BLOOD PRESSURE: 80 MMHG | HEIGHT: 68 IN | TEMPERATURE: 98 F | WEIGHT: 256.81 LBS | SYSTOLIC BLOOD PRESSURE: 120 MMHG | OXYGEN SATURATION: 97 % | HEART RATE: 85 BPM

## 2020-01-23 DIAGNOSIS — E66.9 MILD OBESITY: ICD-10-CM

## 2020-01-23 DIAGNOSIS — F98.8 ATTENTION DEFICIT DISORDER, UNSPECIFIED HYPERACTIVITY PRESENCE: Primary | ICD-10-CM

## 2020-01-23 DIAGNOSIS — F41.9 ANXIETY: ICD-10-CM

## 2020-01-23 DIAGNOSIS — I10 HYPERTENSION, ESSENTIAL: ICD-10-CM

## 2020-01-23 PROCEDURE — 99999 PR PBB SHADOW E&M-EST. PATIENT-LVL III: CPT | Mod: PBBFAC,,, | Performed by: FAMILY MEDICINE

## 2020-01-23 PROCEDURE — 99215 PR OFFICE/OUTPT VISIT, EST, LEVL V, 40-54 MIN: ICD-10-PCS | Mod: S$GLB,,, | Performed by: FAMILY MEDICINE

## 2020-01-23 PROCEDURE — 3008F BODY MASS INDEX DOCD: CPT | Mod: CPTII,S$GLB,, | Performed by: FAMILY MEDICINE

## 2020-01-23 PROCEDURE — 3079F DIAST BP 80-89 MM HG: CPT | Mod: CPTII,S$GLB,, | Performed by: FAMILY MEDICINE

## 2020-01-23 PROCEDURE — 99999 PR PBB SHADOW E&M-EST. PATIENT-LVL III: ICD-10-PCS | Mod: PBBFAC,,, | Performed by: FAMILY MEDICINE

## 2020-01-23 PROCEDURE — 3074F PR MOST RECENT SYSTOLIC BLOOD PRESSURE < 130 MM HG: ICD-10-PCS | Mod: CPTII,S$GLB,, | Performed by: FAMILY MEDICINE

## 2020-01-23 PROCEDURE — 3074F SYST BP LT 130 MM HG: CPT | Mod: CPTII,S$GLB,, | Performed by: FAMILY MEDICINE

## 2020-01-23 PROCEDURE — 3079F PR MOST RECENT DIASTOLIC BLOOD PRESSURE 80-89 MM HG: ICD-10-PCS | Mod: CPTII,S$GLB,, | Performed by: FAMILY MEDICINE

## 2020-01-23 PROCEDURE — 99215 OFFICE O/P EST HI 40 MIN: CPT | Mod: S$GLB,,, | Performed by: FAMILY MEDICINE

## 2020-01-23 PROCEDURE — 3008F PR BODY MASS INDEX (BMI) DOCUMENTED: ICD-10-PCS | Mod: CPTII,S$GLB,, | Performed by: FAMILY MEDICINE

## 2020-01-23 RX ORDER — DEXTROAMPHETAMINE SACCHARATE, AMPHETAMINE ASPARTATE, DEXTROAMPHETAMINE SULFATE AND AMPHETAMINE SULFATE 2.5; 2.5; 2.5; 2.5 MG/1; MG/1; MG/1; MG/1
TABLET ORAL
Qty: 60 TABLET | Refills: 0 | Status: SHIPPED | OUTPATIENT
Start: 2020-01-23 | End: 2021-01-05 | Stop reason: SDUPTHER

## 2020-01-23 RX ORDER — DEXTROAMPHETAMINE SACCHARATE, AMPHETAMINE ASPARTATE, DEXTROAMPHETAMINE SULFATE AND AMPHETAMINE SULFATE 2.5; 2.5; 2.5; 2.5 MG/1; MG/1; MG/1; MG/1
TABLET ORAL
Qty: 60 TABLET | Refills: 0 | Status: SHIPPED | OUTPATIENT
Start: 2020-03-20 | End: 2020-04-19

## 2020-01-23 RX ORDER — DULOXETIN HYDROCHLORIDE 20 MG/1
20 CAPSULE, DELAYED RELEASE ORAL DAILY
Qty: 30 CAPSULE | Refills: 0 | Status: SHIPPED | OUTPATIENT
Start: 2020-01-23 | End: 2020-09-30 | Stop reason: ALTCHOICE

## 2020-01-23 RX ORDER — DEXTROAMPHETAMINE SACCHARATE, AMPHETAMINE ASPARTATE, DEXTROAMPHETAMINE SULFATE AND AMPHETAMINE SULFATE 2.5; 2.5; 2.5; 2.5 MG/1; MG/1; MG/1; MG/1
TABLET ORAL
Qty: 60 TABLET | Refills: 0 | Status: SHIPPED | OUTPATIENT
Start: 2020-02-21 | End: 2020-03-22

## 2020-01-23 RX ORDER — CLONAZEPAM 1 MG/1
1 TABLET ORAL 2 TIMES DAILY
Qty: 60 TABLET | Refills: 5 | Status: SHIPPED | OUTPATIENT
Start: 2020-01-23 | End: 2020-09-30 | Stop reason: ALTCHOICE

## 2020-01-23 NOTE — PROGRESS NOTES
"Chief Complaint   Patient presents with    Hypertension    ADD    Anxiety       SUBJECTIVE:  Garth Tejeda is a 39 y.o. male here for follow up of chronic pain.   Patient has ADHD and is well controleld without drug side effects and doing well overall without any unusual symptoms or history.  Has other concerns including per problem list.   Chief Complaint   Patient presents with    Hypertension    ADD    Anxiety          Currently has co-morbidities including below problem list.      Social History     Tobacco Use    Smoking status: Never Smoker    Smokeless tobacco: Never Used   Substance Use Topics    Alcohol use: Yes     Comment: socially     Drug use: Yes     Types: Marijuana       Patient Active Problem List    Diagnosis Date Noted    Body water dehydration 09/23/2019    Mild obesity 11/08/2017    Anxiety 02/22/2017     Long standing  Seen by psych, no admissions  effexor was weight gain  Ativan PRN      ADD (attention deficit disorder) 02/22/2017    Hypertension, essential 02/22/2017    Chest pain, atypical     Left subclavian artery occlusion 03/28/2016     Secondary to muscle impingement, has collateral flow now, just monitoring      History of congenital heart defect 12/31/2015    Diminished pulse 12/31/2015     LEFT UPPER EXTREMITY         Review of Systems   Constitutional: Negative.    HENT: Negative.    Eyes: Negative.    Respiratory: Negative.    Cardiovascular: Negative.    Gastrointestinal: Negative.    Genitourinary: Negative.    Musculoskeletal: Positive for back pain and myalgias.   Skin: Negative.    Neurological: Negative.    Endo/Heme/Allergies: Negative.    Psychiatric/Behavioral: The patient is nervous/anxious and has insomnia.        OBJECTIVE:  /80   Pulse 85   Temp 97.8 °F (36.6 °C) (Oral)   Ht 5' 8" (1.727 m)   Wt 116.5 kg (256 lb 13.4 oz)   SpO2 97%   BMI 39.05 kg/m²     Wt Readings from Last 3 Encounters:   01/23/20 116.5 kg (256 lb 13.4 oz)   09/23/19 " 117 kg (257 lb 15 oz)   08/05/19 114.7 kg (252 lb 13.9 oz)     BP Readings from Last 3 Encounters:   01/23/20 120/80   09/23/19 90/60   08/05/19 110/80       Patient is in usual state of health, alert and oriented without signs of intoxication.  No evidence on exam of illegal substance use or concerning symptoms involving abuse or intoxication (specifically evidence of IVDU, unusual bruising, slurred speech, unusual explanations).  General appearance: alert, appears stated age and cooperative  Eyes: conjunctivae/corneas clear. PERRL, EOM's intact. Fundi benign.  Ears: normal TM's and external ear canals both ears  Nose: Nares normal. Septum midline. Mucosa normal. No drainage or sinus tenderness.  Throat: lips, mucosa, and tongue normal; teeth and gums normal  Neck: no adenopathy, no carotid bruit, no JVD, supple, symmetrical, trachea midline and thyroid not enlarged, symmetric, no tenderness/mass/nodules  Back: symmetric, no curvature. ROM normal. No CVA tenderness.  Lungs: clear to auscultation bilaterally  Heart: regular rate and rhythm, S1, S2 normal, no murmur, click, rub or gallop  Abdomen: soft, non-tender; bowel sounds normal; no masses,  no organomegaly  Pulses: 2+ and symmetric  Skin: Skin color, texture, turgor normal. No rashes or lesions  Lymph nodes: Cervical, supraclavicular, and axillary nodes normal.  Neurologic: Grossly normal               Review of old Records:  Reviewed per epic and   NARx OD score/stimulant score: 200  Review of old labs:    Lab Results   Component Value Date    TSH 3.676 08/15/2018     Lab Results   Component Value Date    WBC 5.49 03/26/2019    HGB 14.5 03/26/2019    HCT 43.8 03/26/2019    MCV 87 03/26/2019     03/26/2019       Chemistry        Component Value Date/Time     03/26/2019 0302    K 3.8 03/26/2019 0302     03/26/2019 0302    CO2 28 03/26/2019 0302    BUN 14 03/26/2019 0302    CREATININE 1.0 03/26/2019 0302     03/26/2019 0302         Component Value Date/Time    CALCIUM 9.5 03/26/2019 0302    ALKPHOS 87 03/26/2019 0302    AST 20 03/26/2019 0302    ALT 27 03/26/2019 0302    BILITOT 0.4 03/26/2019 0302    ESTGFRAFRICA >60 03/26/2019 0302    EGFRNONAA >60 03/26/2019 0302        Lab Results   Component Value Date    CHOL 159 08/15/2018    CHOL 138 11/17/2017    CHOL 161 12/31/2015     Lab Results   Component Value Date    HDL 46 08/15/2018    HDL 46 11/17/2017    HDL 56 12/31/2015     Lab Results   Component Value Date    LDLCALC 86.4 08/15/2018    LDLCALC 73.0 11/17/2017    LDLCALC 87.2 12/31/2015     Lab Results   Component Value Date    TRIG 133 08/15/2018    TRIG 95 11/17/2017    TRIG 89 12/31/2015     Lab Results   Component Value Date    CHOLHDL 28.9 08/15/2018    CHOLHDL 33.3 11/17/2017    CHOLHDL 34.8 12/31/2015         Review of old imaging:  Reviewed last EKG tracing if available.          ASSESSMENT:    ICD-10-CM ICD-9-CM   1. Attention deficit disorder, unspecified hyperactivity presence F98.8 314.00   2. Anxiety F41.9 300.00   3. Mild obesity E66.9 278.00   4. Hypertension, essential I10 401.9       No evidence of abuse/diversion/addiction noted through history and physical, or checking the .  Risks, benefits and alternate treatments are considered and plan of care reflects that shared decision with the patient.  Went over schedule II responsibilities, including abuse, side effects, refill restrictions, necessary visits, drug testing, protection of medication.  Patient voices understanding.      PLAN:    Problem List Items Addressed This Visit     Mild obesity    Hypertension, essential    Anxiety    Overview     Long standing  Seen by psych, no admissions  effexor was weight gain  Ativan PRN         Relevant Medications    clonazePAM (KLONOPIN) 1 MG tablet    DULoxetine (CYMBALTA) 20 MG capsule    ADD (attention deficit disorder) - Primary    Relevant Medications    dextroamphetamine-amphetamine 10 mg Tab     dextroamphetamine-amphetamine 10 mg Tab (Start on 2/21/2020)    dextroamphetamine-amphetamine 10 mg Tab (Start on 3/20/2020)          Medication List with Changes/Refills   New Medications    DEXTROAMPHETAMINE-AMPHETAMINE 10 MG TAB    TAKE ONE TABLET BY MOUTH TWICE DAILY (fill on 11/21/2019)    DEXTROAMPHETAMINE-AMPHETAMINE 10 MG TAB    TAKE ONE TABLET BY MOUTH TWICE DAILY (fill on 11/21/2019)    DULOXETINE (CYMBALTA) 20 MG CAPSULE    Take 1 capsule (20 mg total) by mouth once daily.   Current Medications    ASPIRIN (ECOTRIN) 81 MG EC TABLET    Take 81 mg by mouth once daily.    CARVEDILOL (COREG) 12.5 MG TABLET    TAKE ONE TABLET BY MOUTH TWICE DAILY WITH MEALS FOR BLOOD PRESSURE    DESLORATADINE (CLARINEX) 5 MG TABLET    Take 1 tablet (5 mg total) by mouth once daily.    FLUTICASONE PROPIONATE (FLONASE) 50 MCG/ACTUATION NASAL SPRAY    2 sprays (100 mcg total) by Each Nostril route once daily.    LISINOPRIL 10 MG TABLET    Take 1 tablet (10 mg total) by mouth 2 (two) times daily.   Changed and/or Refilled Medications    Modified Medication Previous Medication    CLONAZEPAM (KLONOPIN) 1 MG TABLET clonazePAM (KLONOPIN) 1 MG tablet       Take 1 tablet (1 mg total) by mouth 2 (two) times daily.    TAKE ONE TABLET BY MOUTH TWICE DAILY    DEXTROAMPHETAMINE-AMPHETAMINE 10 MG TAB dextroamphetamine-amphetamine 10 mg Tab       TAKE ONE TABLET BY MOUTH TWICE DAILY (fill on 11/21/2019)    TAKE ONE TABLET BY MOUTH TWICE DAILY (fill on 11/21/2019)   Discontinued Medications    MECLIZINE (ANTIVERT) 25 MG TABLET    Take 1 tablet (25 mg total) by mouth 3 (three) times daily as needed for Dizziness.         Continue with current care unless changes noted below.    High risk medication review completed per guidelines to insure safest use of medication.      We reviewed our goals of care:    Improvement in concentration and mood  Aid focus in completing tasks at work/school  Safety first priority      And  discontinuation      Intolerable side effects  Evidence of abuse/misuse or diversion      No future appointments.    3 months or sooner as needed

## 2020-04-16 ENCOUNTER — PATIENT MESSAGE (OUTPATIENT)
Dept: FAMILY MEDICINE | Facility: CLINIC | Age: 40
End: 2020-04-16

## 2020-04-16 DIAGNOSIS — I70.8 LEFT SUBCLAVIAN ARTERY OCCLUSION: ICD-10-CM

## 2020-04-16 DIAGNOSIS — Z87.74 HISTORY OF CONGENITAL HEART DEFECT: ICD-10-CM

## 2020-04-17 DIAGNOSIS — I70.8 LEFT SUBCLAVIAN ARTERY OCCLUSION: ICD-10-CM

## 2020-04-17 DIAGNOSIS — Z87.74 HISTORY OF CONGENITAL HEART DEFECT: ICD-10-CM

## 2020-04-17 DIAGNOSIS — J30.9 ALLERGIC RHINITIS, UNSPECIFIED SEASONALITY, UNSPECIFIED TRIGGER: ICD-10-CM

## 2020-04-17 RX ORDER — LISINOPRIL 10 MG/1
10 TABLET ORAL 2 TIMES DAILY
Qty: 180 TABLET | Refills: 1 | Status: SHIPPED | OUTPATIENT
Start: 2020-04-17 | End: 2020-08-03

## 2020-04-17 RX ORDER — DESLORATADINE 5 MG/1
5 TABLET ORAL DAILY
Qty: 30 TABLET | Refills: 11 | Status: SHIPPED | OUTPATIENT
Start: 2020-04-17 | End: 2020-12-29 | Stop reason: SDUPTHER

## 2020-05-21 RX ORDER — SCOLOPAMINE TRANSDERMAL SYSTEM 1 MG/1
1 PATCH, EXTENDED RELEASE TRANSDERMAL
Qty: 4 PATCH | Refills: 0 | Status: SHIPPED | OUTPATIENT
Start: 2020-05-21 | End: 2020-09-30 | Stop reason: ALTCHOICE

## 2020-09-30 ENCOUNTER — OFFICE VISIT (OUTPATIENT)
Dept: FAMILY MEDICINE | Facility: CLINIC | Age: 40
End: 2020-09-30
Payer: COMMERCIAL

## 2020-09-30 VITALS
WEIGHT: 255.75 LBS | DIASTOLIC BLOOD PRESSURE: 68 MMHG | TEMPERATURE: 98 F | SYSTOLIC BLOOD PRESSURE: 124 MMHG | HEART RATE: 96 BPM | OXYGEN SATURATION: 96 % | BODY MASS INDEX: 42.61 KG/M2 | HEIGHT: 65 IN

## 2020-09-30 DIAGNOSIS — Z00.00 ANNUAL PHYSICAL EXAM: Primary | ICD-10-CM

## 2020-09-30 DIAGNOSIS — F41.9 ANXIETY: ICD-10-CM

## 2020-09-30 PROCEDURE — 3008F PR BODY MASS INDEX (BMI) DOCUMENTED: ICD-10-PCS | Mod: CPTII,S$GLB,, | Performed by: FAMILY MEDICINE

## 2020-09-30 PROCEDURE — 99999 PR PBB SHADOW E&M-EST. PATIENT-LVL III: ICD-10-PCS | Mod: PBBFAC,,, | Performed by: FAMILY MEDICINE

## 2020-09-30 PROCEDURE — 99396 PREV VISIT EST AGE 40-64: CPT | Mod: S$GLB,,, | Performed by: FAMILY MEDICINE

## 2020-09-30 PROCEDURE — 3074F SYST BP LT 130 MM HG: CPT | Mod: CPTII,S$GLB,, | Performed by: FAMILY MEDICINE

## 2020-09-30 PROCEDURE — 3078F DIAST BP <80 MM HG: CPT | Mod: CPTII,S$GLB,, | Performed by: FAMILY MEDICINE

## 2020-09-30 PROCEDURE — 3008F BODY MASS INDEX DOCD: CPT | Mod: CPTII,S$GLB,, | Performed by: FAMILY MEDICINE

## 2020-09-30 PROCEDURE — 99999 PR PBB SHADOW E&M-EST. PATIENT-LVL III: CPT | Mod: PBBFAC,,, | Performed by: FAMILY MEDICINE

## 2020-09-30 PROCEDURE — 3074F PR MOST RECENT SYSTOLIC BLOOD PRESSURE < 130 MM HG: ICD-10-PCS | Mod: CPTII,S$GLB,, | Performed by: FAMILY MEDICINE

## 2020-09-30 PROCEDURE — 99396 PR PREVENTIVE VISIT,EST,40-64: ICD-10-PCS | Mod: S$GLB,,, | Performed by: FAMILY MEDICINE

## 2020-09-30 PROCEDURE — 3078F PR MOST RECENT DIASTOLIC BLOOD PRESSURE < 80 MM HG: ICD-10-PCS | Mod: CPTII,S$GLB,, | Performed by: FAMILY MEDICINE

## 2020-09-30 RX ORDER — VILAZODONE HYDROCHLORIDE 20 MG/1
1 TABLET ORAL DAILY
Qty: 30 TABLET | Refills: 11 | Status: SHIPPED | OUTPATIENT
Start: 2020-09-30 | End: 2021-10-14

## 2020-09-30 RX ORDER — DIAZEPAM 10 MG/1
TABLET ORAL
Qty: 2 TABLET | Refills: 0 | Status: SHIPPED | OUTPATIENT
Start: 2020-09-30 | End: 2020-10-02 | Stop reason: SDUPTHER

## 2020-09-30 NOTE — PROGRESS NOTES
Chief Complaint   Patient presents with    Anxiety     SUBJECTIVE:   Garth Tejeda is a 40 y.o. male presenting for his annual checkup.  Current Outpatient Medications   Medication Sig Dispense Refill    aspirin (ECOTRIN) 81 MG EC tablet Take 81 mg by mouth once daily.      carvedilol (COREG) 12.5 MG tablet TAKE ONE TABLET BY MOUTH TWICE DAILY WITH MEALS FOR BLOOD PRESSURE 180 tablet 3    desloratadine (CLARINEX) 5 mg tablet Take 1 tablet (5 mg total) by mouth once daily. 30 tablet 11    dextroamphetamine-amphetamine 10 mg Tab TAKE ONE TABLET BY MOUTH TWICE DAILY (fill on 11/21/2019) 60 tablet 0    fluticasone propionate (FLONASE) 50 mcg/actuation nasal spray 2 sprays (100 mcg total) by Each Nostril route once daily. 1 Bottle 1    lisinopriL 10 MG tablet TAKE ONE TABLET BY MOUTH TWICE DAILY FOR BLOOD PRESSURE 180 tablet 0    diazePAM (VALIUM) 10 MG Tab Take 1 tablet (10 mg total) by mouth 2 (two) times daily as needed. Replacing klonopin 60 tablet 0    vilazodone (VIIBRYD) 20 mg Tab Take 1 tablet (20 mg total) by mouth once daily. 30 tablet 11     No current facility-administered medications for this visit.      Allergies: Patient has no known allergies.     ROS:  Feeling well. No dyspnea or chest pain on exertion. No abdominal pain, change in bowel habits, black or bloody stools. No urinary tract or prostatic symptoms. No neurological complaints.  Review of Systems   HENT: Negative for hearing loss.    Eyes: Negative for discharge.   Respiratory: Negative for wheezing.    Cardiovascular: Positive for chest pain and palpitations.   Gastrointestinal: Negative for blood in stool, constipation, diarrhea and vomiting.   Genitourinary: Negative for hematuria and urgency.   Musculoskeletal: Negative for neck pain.   Neurological: Negative for weakness and headaches.   Endo/Heme/Allergies: Negative for polydipsia.     OBJECTIVE:   The patient appears well, alert, oriented x 3, in no distress.   /68    "Pulse 96   Temp 98 °F (36.7 °C) (Oral)   Ht 5' 5" (1.651 m)   Wt 116 kg (255 lb 11.7 oz)   SpO2 96%   BMI 42.56 kg/m²   ENT normal.  Neck supple. No adenopathy or thyromegaly. DEANNA. Lungs are clear, good air entry, no wheezes, rhonchi or rales. S1 and S2 normal, no murmurs, regular rate and rhythm. Abdomen is soft without tenderness, guarding, mass or organomegaly.  exam: deferre.  Extremities show no edema, normal peripheral pulses. Neurological is normal without focal findings.  Obese  Chronic changes to LUE vasculature  ASSESSMENT:   1. Anxiety    2. Annual physical exam        PLAN:   Garth was seen today for anxiety.    Diagnoses and all orders for this visit:    Annual physical exam    Anxiety  -     Discontinue: diazePAM (VALIUM) 10 MG Tab; 1 po 2 hours prior to MRI and 1 PO 30 minutes before  -     vilazodone (VIIBRYD) 20 mg Tab; Take 1 tablet (20 mg total) by mouth once daily.    Other orders  -     Cancel: Tdap Vaccine  -     Cancel: Influenza - Quadrivalent (PF)      Counseled on age appropriate medical preventative services, including age appropriate cancer screenings, over all nutritional health, need for a consistent exercise regimen and an over all push towards maintaining a vigorous and active lifestyle.  Counseled on age appropriate vaccines and discussed upcoming health care needs based on age/gender.  Spent time with patient counseling on need for a good patient/doctor relationship moving forward.  Discussed use of common OTC medications and supplements.  Discussed common dietary aids and use of caffeine and the need for good sleep hygiene and stress management.    F/u with cardiology  Get MRI of heart    See if valium works better viibryd medically necessary as he has had ill side effects with the other SSRI and SNRI we have tried.      "

## 2020-10-01 ENCOUNTER — TELEPHONE (OUTPATIENT)
Dept: FAMILY MEDICINE | Facility: CLINIC | Age: 40
End: 2020-10-01

## 2020-10-01 NOTE — TELEPHONE ENCOUNTER
Received [id number] for Viibryd 20MG tablets, good from 10/9/20-10/9/2021. LM for patient that his meds was approved and he may go pick them up at his Pharmacy.     Patient was prescribed , Viibryd 20MG tablets has been rejected by insurance.    50%*  The likelihood this medication will be covered by the patient's insurance plan if you attempt a prior authorization.  Generic therapeutic alternatives for this drug are available. These first-line therapies may not require a prior authorization.       Likelihood Covered    -  Citalopram Hydrobromide   -  Escitalopram Oxalate   -  FLUoxetine HCl   -  PARoxetine HCl   -  Sertraline HCl   -  DULoxetine HCl   -  Venlafaxine HCl ER

## 2020-10-02 DIAGNOSIS — F41.9 ANXIETY: ICD-10-CM

## 2020-10-02 RX ORDER — DIAZEPAM 10 MG/1
10 TABLET ORAL 2 TIMES DAILY PRN
Qty: 60 TABLET | Refills: 0 | Status: SHIPPED | OUTPATIENT
Start: 2020-10-02 | End: 2020-11-16 | Stop reason: SDUPTHER

## 2020-11-03 ENCOUNTER — TELEPHONE (OUTPATIENT)
Dept: FAMILY MEDICINE | Facility: CLINIC | Age: 40
End: 2020-11-03

## 2020-11-03 DIAGNOSIS — I70.8 LEFT SUBCLAVIAN ARTERY OCCLUSION: Primary | ICD-10-CM

## 2020-11-03 RX ORDER — ATORVASTATIN CALCIUM 10 MG/1
10 TABLET, FILM COATED ORAL DAILY
Qty: 90 TABLET | Refills: 3 | Status: SHIPPED | OUTPATIENT
Start: 2020-11-03 | End: 2021-10-26 | Stop reason: ALTCHOICE

## 2020-11-03 NOTE — TELEPHONE ENCOUNTER
----- Message from Patrica Marie MA sent at 11/2/2020 11:56 AM CST -----  Good morning ,     This pt.is on the statin list for [facility] but the pt.is not on a prescribed statin.

## 2020-11-16 ENCOUNTER — OFFICE VISIT (OUTPATIENT)
Dept: FAMILY MEDICINE | Facility: CLINIC | Age: 40
End: 2020-11-16
Payer: COMMERCIAL

## 2020-11-16 VITALS
HEIGHT: 68 IN | WEIGHT: 255.75 LBS | OXYGEN SATURATION: 97 % | TEMPERATURE: 99 F | BODY MASS INDEX: 38.76 KG/M2 | DIASTOLIC BLOOD PRESSURE: 76 MMHG | HEART RATE: 95 BPM | SYSTOLIC BLOOD PRESSURE: 110 MMHG

## 2020-11-16 DIAGNOSIS — F41.9 ANXIETY: ICD-10-CM

## 2020-11-16 DIAGNOSIS — Q38.6 LONG UVULA: Primary | ICD-10-CM

## 2020-11-16 PROCEDURE — 1125F PR PAIN SEVERITY QUANTIFIED, PAIN PRESENT: ICD-10-PCS | Mod: S$GLB,,, | Performed by: FAMILY MEDICINE

## 2020-11-16 PROCEDURE — 99214 PR OFFICE/OUTPT VISIT, EST, LEVL IV, 30-39 MIN: ICD-10-PCS | Mod: S$GLB,,, | Performed by: FAMILY MEDICINE

## 2020-11-16 PROCEDURE — 3078F DIAST BP <80 MM HG: CPT | Mod: CPTII,S$GLB,, | Performed by: FAMILY MEDICINE

## 2020-11-16 PROCEDURE — 3078F PR MOST RECENT DIASTOLIC BLOOD PRESSURE < 80 MM HG: ICD-10-PCS | Mod: CPTII,S$GLB,, | Performed by: FAMILY MEDICINE

## 2020-11-16 PROCEDURE — 99999 PR PBB SHADOW E&M-EST. PATIENT-LVL III: CPT | Mod: PBBFAC,,, | Performed by: FAMILY MEDICINE

## 2020-11-16 PROCEDURE — 3008F PR BODY MASS INDEX (BMI) DOCUMENTED: ICD-10-PCS | Mod: CPTII,S$GLB,, | Performed by: FAMILY MEDICINE

## 2020-11-16 PROCEDURE — 99214 OFFICE O/P EST MOD 30 MIN: CPT | Mod: S$GLB,,, | Performed by: FAMILY MEDICINE

## 2020-11-16 PROCEDURE — 3074F SYST BP LT 130 MM HG: CPT | Mod: CPTII,S$GLB,, | Performed by: FAMILY MEDICINE

## 2020-11-16 PROCEDURE — 99999 PR PBB SHADOW E&M-EST. PATIENT-LVL III: ICD-10-PCS | Mod: PBBFAC,,, | Performed by: FAMILY MEDICINE

## 2020-11-16 PROCEDURE — 3008F BODY MASS INDEX DOCD: CPT | Mod: CPTII,S$GLB,, | Performed by: FAMILY MEDICINE

## 2020-11-16 PROCEDURE — 1125F AMNT PAIN NOTED PAIN PRSNT: CPT | Mod: S$GLB,,, | Performed by: FAMILY MEDICINE

## 2020-11-16 PROCEDURE — 3074F PR MOST RECENT SYSTOLIC BLOOD PRESSURE < 130 MM HG: ICD-10-PCS | Mod: CPTII,S$GLB,, | Performed by: FAMILY MEDICINE

## 2020-11-16 RX ORDER — PREDNISONE 20 MG/1
20 TABLET ORAL DAILY
Qty: 7 TABLET | Refills: 0 | Status: SHIPPED | OUTPATIENT
Start: 2020-11-16 | End: 2020-11-23

## 2020-11-16 RX ORDER — DIAZEPAM 10 MG/1
10 TABLET ORAL 2 TIMES DAILY PRN
Qty: 60 TABLET | Refills: 2 | Status: SHIPPED | OUTPATIENT
Start: 2020-11-16 | End: 2021-01-05 | Stop reason: SDUPTHER

## 2020-11-16 NOTE — PROGRESS NOTES
Chief Complaint   Patient presents with    Sore Throat       SUBJECTIVE:  Garth Tejeda is a 40 y.o. male here for new problem of throat with pain and SoB and uvula swelling and with valium helping with anxiety.  Currently has co-morbidities including per problem list.      Past Medical History:   Diagnosis Date    Anxiety     Hypertension     Septal defect      Past Surgical History:   Procedure Laterality Date    CARDIAC SURGERY      WRIST SURGERY      right     Social History     Socioeconomic History    Marital status:      Spouse name: Not on file    Number of children: Not on file    Years of education: Not on file    Highest education level: Not on file   Occupational History    Not on file   Social Needs    Financial resource strain: Not on file    Food insecurity     Worry: Not on file     Inability: Not on file    Transportation needs     Medical: Not on file     Non-medical: Not on file   Tobacco Use    Smoking status: Never Smoker    Smokeless tobacco: Never Used   Substance and Sexual Activity    Alcohol use: Yes     Comment: socially     Drug use: Yes     Types: Marijuana    Sexual activity: Yes     Partners: Female   Lifestyle    Physical activity     Days per week: Not on file     Minutes per session: Not on file    Stress: Not at all   Relationships    Social connections     Talks on phone: Not on file     Gets together: Not on file     Attends Caodaism service: Not on file     Active member of club or organization: Not on file     Attends meetings of clubs or organizations: Not on file     Relationship status: Not on file   Other Topics Concern    Not on file   Social History Narrative    HAS [employer]     Family History   Problem Relation Age of Onset    Diabetes Mother     Hypertension Mother      Current Outpatient Medications on File Prior to Visit   Medication Sig Dispense Refill    aspirin (ECOTRIN) 81 MG EC tablet Take 81 mg by mouth once daily.       "atorvastatin (LIPITOR) 10 MG tablet Take 1 tablet (10 mg total) by mouth once daily. 90 tablet 3    carvedilol (COREG) 12.5 MG tablet TAKE ONE TABLET BY MOUTH TWICE DAILY WITH MEALS FOR BLOOD PRESSURE 180 tablet 3    desloratadine (CLARINEX) 5 mg tablet Take 1 tablet (5 mg total) by mouth once daily. 30 tablet 11    dextroamphetamine-amphetamine 10 mg Tab TAKE ONE TABLET BY MOUTH TWICE DAILY (fill on 11/21/2019) 60 tablet 0    fluticasone propionate (FLONASE) 50 mcg/actuation nasal spray 2 sprays (100 mcg total) by Each Nostril route once daily. 1 Bottle 1    lisinopriL 10 MG tablet TAKE ONE TABLET BY MOUTH TWICE DAILY FOR BLOOD PRESSURE 180 tablet 0    vilazodone (VIIBRYD) 20 mg Tab Take 1 tablet (20 mg total) by mouth once daily. 30 tablet 11    [DISCONTINUED] diazePAM (VALIUM) 10 MG Tab Take 1 tablet (10 mg total) by mouth 2 (two) times daily as needed. Replacing klonopin 60 tablet 0     No current facility-administered medications on file prior to visit.      Review of patient's allergies indicates:  No Known Allergies      Review of Systems   Constitutional: Negative.    HENT: Positive for sore throat.    Respiratory: Positive for cough and shortness of breath.    Psychiatric/Behavioral: The patient is nervous/anxious.        OBJECTIVE:  /76   Pulse 95   Temp 99.3 °F (37.4 °C) (Oral)   Ht 5' 8" (1.727 m)   Wt 116 kg (255 lb 11.7 oz)   SpO2 97%   BMI 38.88 kg/m²     Wt Readings from Last 3 Encounters:   11/16/20 116 kg (255 lb 11.7 oz)   09/30/20 116 kg (255 lb 11.7 oz)   01/23/20 116.5 kg (256 lb 13.4 oz)     BP Readings from Last 3 Encounters:   11/16/20 110/76   09/30/20 124/68   01/23/20 120/80       He appears well, in no apparent distress.  Alert and oriented times three, pleasant and cooperative. Vital signs are as documented in vital signs section.  Uvula long, inferior end with swelling and mild redness.    Review of old Records:  Reviewed per epic and [facility]    Review of old " labs:      Review of old imaging:      ASSESSMENT:  Problem List Items Addressed This Visit     Anxiety    Relevant Medications    diazePAM (VALIUM) 10 MG Tab      Other Visit Diagnoses     Long uvula    -  Primary    Relevant Medications    predniSONE (DELTASONE) 20 MG tablet    (Magic mouthwash) 1:1:1 Benadryl 12.5mg/5ml liq, aluminum & magnesium hydroxide-simehticone (Maalox), lidocaine viscous 2%    Other Relevant Orders    Ambulatory referral/consult to ENT          ICD-10-CM ICD-9-CM   1. Long uvula  Q38.5 750.9   2. Anxiety  F41.9 300.00         PLAN:  Problem List Items Addressed This Visit     Anxiety    Overview     Long standing  Seen by psych, no admissions  effexor was weight gain  Ativan PRN         Relevant Medications    diazePAM (VALIUM) 10 MG Tab      Other Visit Diagnoses     Long uvula    -  Primary    Relevant Medications    predniSONE (DELTASONE) 20 MG tablet    (Magic mouthwash) 1:1:1 Benadryl 12.5mg/5ml liq, aluminum & magnesium hydroxide-simehticone (Maalox), lidocaine viscous 2%    Other Relevant Orders    Ambulatory referral/consult to ENT      Potential medication side effects were discussed with the patient; let me know if any occur.      Medication List with Changes/Refills   New Medications    (MAGIC MOUTHWASH) 1:1:1 BENADRYL 12.5MG/5ML LIQ, ALUMINUM & MAGNESIUM HYDROXIDE-SIMEHTICONE (MAALOX), LIDOCAINE VISCOUS 2%    Swish and spit 15 mLs every 4 (four) hours as needed. for mouth sores    PREDNISONE (DELTASONE) 20 MG TABLET    Take 1 tablet (20 mg total) by mouth once daily. for 7 days   Current Medications    ASPIRIN (ECOTRIN) 81 MG EC TABLET    Take 81 mg by mouth once daily.    ATORVASTATIN (LIPITOR) 10 MG TABLET    Take 1 tablet (10 mg total) by mouth once daily.    CARVEDILOL (COREG) 12.5 MG TABLET    TAKE ONE TABLET BY MOUTH TWICE DAILY WITH MEALS FOR BLOOD PRESSURE    DESLORATADINE (CLARINEX) 5 MG TABLET    Take 1 tablet (5 mg total) by mouth once daily.     DEXTROAMPHETAMINE-AMPHETAMINE 10 MG TAB    TAKE ONE TABLET BY MOUTH TWICE DAILY (fill on 11/21/2019)    FLUTICASONE PROPIONATE (FLONASE) 50 MCG/ACTUATION NASAL SPRAY    2 sprays (100 mcg total) by Each Nostril route once daily.    LISINOPRIL 10 MG TABLET    TAKE ONE TABLET BY MOUTH TWICE DAILY FOR BLOOD PRESSURE    VILAZODONE (VIIBRYD) 20 MG TAB    Take 1 tablet (20 mg total) by mouth once daily.   Changed and/or Refilled Medications    Modified Medication Previous Medication    DIAZEPAM (VALIUM) 10 MG TAB diazePAM (VALIUM) 10 MG Tab       Take 1 tablet (10 mg total) by mouth 2 (two) times daily as needed.    Take 1 tablet (10 mg total) by mouth 2 (two) times daily as needed. Replacing klonopin         Follow up in about 2 weeks (around 11/30/2020) for reassess acute condition.

## 2020-12-07 ENCOUNTER — OFFICE VISIT (OUTPATIENT)
Dept: OTOLARYNGOLOGY | Facility: CLINIC | Age: 40
End: 2020-12-07
Payer: COMMERCIAL

## 2020-12-07 VITALS
DIASTOLIC BLOOD PRESSURE: 78 MMHG | HEIGHT: 68 IN | TEMPERATURE: 98 F | BODY MASS INDEX: 39.73 KG/M2 | SYSTOLIC BLOOD PRESSURE: 118 MMHG | WEIGHT: 262.13 LBS

## 2020-12-07 DIAGNOSIS — Q38.6 LONG UVULA: ICD-10-CM

## 2020-12-07 DIAGNOSIS — H61.21 IMPACTED CERUMEN OF RIGHT EAR: ICD-10-CM

## 2020-12-07 DIAGNOSIS — J30.2 SEASONAL ALLERGIC RHINITIS, UNSPECIFIED TRIGGER: ICD-10-CM

## 2020-12-07 DIAGNOSIS — R06.83 SNORING: Primary | ICD-10-CM

## 2020-12-07 DIAGNOSIS — J34.3 HYPERTROPHY OF INFERIOR NASAL TURBINATE: ICD-10-CM

## 2020-12-07 PROCEDURE — 1126F PR PAIN SEVERITY QUANTIFIED, NO PAIN PRESENT: ICD-10-PCS | Mod: S$GLB,,, | Performed by: OTOLARYNGOLOGY

## 2020-12-07 PROCEDURE — 3078F DIAST BP <80 MM HG: CPT | Mod: CPTII,S$GLB,, | Performed by: OTOLARYNGOLOGY

## 2020-12-07 PROCEDURE — 69210 REMOVE IMPACTED EAR WAX UNI: CPT | Mod: S$GLB,,, | Performed by: OTOLARYNGOLOGY

## 2020-12-07 PROCEDURE — 1126F AMNT PAIN NOTED NONE PRSNT: CPT | Mod: S$GLB,,, | Performed by: OTOLARYNGOLOGY

## 2020-12-07 PROCEDURE — 99204 OFFICE O/P NEW MOD 45 MIN: CPT | Mod: 25,S$GLB,, | Performed by: OTOLARYNGOLOGY

## 2020-12-07 PROCEDURE — 3008F PR BODY MASS INDEX (BMI) DOCUMENTED: ICD-10-PCS | Mod: CPTII,S$GLB,, | Performed by: OTOLARYNGOLOGY

## 2020-12-07 PROCEDURE — 3078F PR MOST RECENT DIASTOLIC BLOOD PRESSURE < 80 MM HG: ICD-10-PCS | Mod: CPTII,S$GLB,, | Performed by: OTOLARYNGOLOGY

## 2020-12-07 PROCEDURE — 3008F BODY MASS INDEX DOCD: CPT | Mod: CPTII,S$GLB,, | Performed by: OTOLARYNGOLOGY

## 2020-12-07 PROCEDURE — 3074F SYST BP LT 130 MM HG: CPT | Mod: CPTII,S$GLB,, | Performed by: OTOLARYNGOLOGY

## 2020-12-07 PROCEDURE — 69210 PR REMOVAL IMPACTED CERUMEN REQUIRING INSTRUMENTATION, UNILATERAL: ICD-10-PCS | Mod: S$GLB,,, | Performed by: OTOLARYNGOLOGY

## 2020-12-07 PROCEDURE — 3074F PR MOST RECENT SYSTOLIC BLOOD PRESSURE < 130 MM HG: ICD-10-PCS | Mod: CPTII,S$GLB,, | Performed by: OTOLARYNGOLOGY

## 2020-12-07 PROCEDURE — 99204 PR OFFICE/OUTPT VISIT, NEW, LEVL IV, 45-59 MIN: ICD-10-PCS | Mod: 25,S$GLB,, | Performed by: OTOLARYNGOLOGY

## 2020-12-07 RX ORDER — FLUTICASONE PROPIONATE 50 MCG
2 SPRAY, SUSPENSION (ML) NASAL NIGHTLY
Qty: 18.2 ML | Refills: 3 | Status: SHIPPED | OUTPATIENT
Start: 2020-12-07 | End: 2021-01-06

## 2020-12-07 NOTE — PATIENT INSTRUCTIONS
"Information and instructions from your visit with me today:    · Start using the following medication nasal sprays:   · Fluticasone spray:    This medication is a steroid spray. It stays within the nose and does not have absorption into the body that leads to side effects that one has with oral steroid medication. Fluticasone nasal spray is the same as the Flonase brand nasal spray. Discuss with your pharmacist if the price is lower over the counter or with a prescription ( this varies depending on insurance). The medication that is over the counter is the same as the prescription medication. Use this medication as instructed on the prescription, 2 sprays on each side of your nose at night .       Additional instructions for medication sprays  Place the tip of the medication bottle in your nose and aim slightly up and out on each side to get medication high and deep into your nose and sinuses, and not have it all deposit in the very front of your nose. Aim the tip of the nozzle towards the outer corner of your eye . You can imagine aiming towards the back of your eyeball on each side for this, as opposed to straight back to the center of your nose and head.     You need to use this medication every day regardless of symptoms, as it takes time ( a few weeks) to work and get the benefits. It does not work on an "as needed" basis like taking a decongestant. If your symptoms only occur in a particular season, then the medication can be used seasonally instead of year long. For seasonal symptoms, you should start using the spray twice daily a month before when you normally have symptoms ( for example, if symptoms start in August, should start at the end of June).     · Start nasal irrigations with saline solution- you can either use a rinse or a mist spray:    SALINE SINUS RINSE ([employer] brand): You should do a full bottle, half on one side of your nose and half on the other, 1-2 times per day (or more if able to, you " cannot do this too much). Follow the instructions on the box: mix the salt packet with clean water (bottle, previously boiled, distilled, etc -- not tap water) to the line on the bottle to make the irrigation.         NASAL SALINE SPRAY ( simply saline and arm and hammer are examples) There are several different brands found in the cold and flu aisle of the pharmacy. You can use any brand of saline spray - this will deliver the saline by a gentle mist ( if you have difficulty or discomfort with nasal rinse/ a lot of fluid in the nose, this will be more comfortable).       Always rinse your nose with saline prior to using medication sprays and wait a couple of hours before using again. You can use the saline throughout the day to help with stuffy nose or dry nose.    · Do not use nasal decongestant sprays such as Afrin or similar products long term ( over 3 days) .  This can cause long term physical nasal addiction. Afrin should only be used if having nose bleeds, severe nasal congestion , or severe ear pain/fullness and should not be used for more than 2-3 days in a row . It is a not a medication that should be used for a long period of time.     It was nice meeting you today, and I look forward to helping you feel better soon. Please don't hesitate to call if you have any other questions or concerns, or if I can be of any assistance in the meantime.      Brigida Salguero MD    [facility]     Phone  [phone]    Fax      [phone]        Brigida Salguero MD  Otorhinolaryngology

## 2020-12-07 NOTE — PROGRESS NOTES
OTOLARYNGOLOGY CLINIC NOTE  Date:  12/07/2020     Chief complaint:  Chief Complaint   Patient presents with    Other     long uvula       History of Present Illness  Garth Tejeda is a 40 y.o. male  presenting today for a new evaluation and treatment of elongated uvula.   Uvula had felt irritated when saw Dr araiza before and got abx but it is long  Does not think he snores but is not sure. His wife does not complain about him snoring  Wakes up with dry mouth   When it was inflammed woke up with gagging choking but normally not an issue with that  More problematic with coughing/choking if sleeps on back but if sleeps on side not a problem. No nocturia  Feels like he gets good sleep otherwise    No issues with nasal congestion.      Was born with ASD.    Past Medical History  Past Medical History:   Diagnosis Date    Anxiety     Hypertension     Septal defect         Past Surgical History  Past Surgical History:   Procedure Laterality Date    CARDIAC SURGERY      WRIST SURGERY      right        Medications  Current Outpatient Medications on File Prior to Visit   Medication Sig Dispense Refill    (Magic mouthwash) 1:1:1 Benadryl 12.5mg/5ml liq, aluminum & magnesium hydroxide-simehticone (Maalox), lidocaine viscous 2% Swish and spit 15 mLs every 4 (four) hours as needed. for mouth sores 240 mL 0    aspirin (ECOTRIN) 81 MG EC tablet Take 81 mg by mouth once daily.      atorvastatin (LIPITOR) 10 MG tablet Take 1 tablet (10 mg total) by mouth once daily. 90 tablet 3    carvedilol (COREG) 12.5 MG tablet TAKE ONE TABLET BY MOUTH TWICE DAILY WITH MEALS FOR BLOOD PRESSURE 180 tablet 3    desloratadine (CLARINEX) 5 mg tablet Take 1 tablet (5 mg total) by mouth once daily. 30 tablet 11    dextroamphetamine-amphetamine 10 mg Tab TAKE ONE TABLET BY MOUTH TWICE DAILY (fill on 11/21/2019) 60 tablet 0    diazePAM (VALIUM) 10 MG Tab Take 1 tablet (10 mg total) by mouth 2 (two) times daily as needed. 60 tablet 2     "fluticasone propionate (FLONASE) 50 mcg/actuation nasal spray 2 sprays (100 mcg total) by Each Nostril route once daily. 1 Bottle 1    lisinopriL 10 MG tablet TAKE ONE TABLET BY MOUTH TWICE DAILY FOR BLOOD PRESSURE 180 tablet 0    vilazodone (VIIBRYD) 20 mg Tab Take 1 tablet (20 mg total) by mouth once daily. 30 tablet 11     No current facility-administered medications on file prior to visit.        Review of Systems  Review of Systems   Constitutional: Negative for fever and malaise/fatigue.   HENT: Negative for congestion and ear pain.    Eyes:        No dry eye   Respiratory: Negative for hemoptysis.    Cardiovascular: Negative for chest pain.   Gastrointestinal: Positive for heartburn.   Musculoskeletal: Negative for joint pain.   Skin: Negative for rash.   Neurological: Negative for headaches.   Endo/Heme/Allergies: Positive for environmental allergies (usually in spring and fall, gets runny nose and congestion. uses flonase  and no saline. ).        Social History   reports that he has never smoked. He has never used smokeless tobacco. He reports current alcohol use. He reports current drug use. Drug: Marijuana.     Family History  Family History   Problem Relation Age of Onset    Diabetes Mother     Hypertension Mother         Physical Exam   Vitals:    12/07/20 1434   BP: 118/78   Temp: 98.1 °F (36.7 °C)    Body mass index is 39.86 kg/m².  Weight: 118.9 kg (262 lb 2 oz)   Height: 5' 8" (172.7 cm)     GENERAL: no acute distress.  HEAD: normocephalic.   SKIN: no malignant appearing lesions of skin of head and neck area.  EYES: lids and lashes normal. Pupils equal and reactive to light. Extraocular motions intact. No proptosis  EARS: external ear without lesion, normal pinna shape and position.  External auditory canal with normal cerumen on left , tympanic membrane fully visible, no perforation , no retraction. No middle ear effusion. Ossicles intact. Right EAC with cerumen impaction-see micro note  NOSE: " external nose without abnormality, septum grossly midline with mild turbinate hypertrophy and no purulent drainage on anterior rhinoscopy  ORAL CAVITY/OROPHARYNX: dentition good.modified mallampati 3-4. 2+ tonsils  no mass or lesion of gingiva/buccal mucosa/floor of mouth/tongue. tongue midline and mobile with lateral edge scalloping. No fasciculations. Floor of mouth and base of tongue soft to palpation. Symmetric palate rise. Uvula midline and elongated with slight edema but no erythema.   NECK: trachea midline. No discrete palpable thyroid nodule. No parotid mass nor tenderness. No submandibular gland mass nor tenderness.  LYMPH NODES:No cervical lymphadenopathy.  RESPIRATORY: no stridor, no stertor. Voice normal. Respirations nonlabored.  NEURO: alert, responds to questions appropriately.  Facial movement symmetric with good eye closure and symmetric smile.   PSYCH:mood appropriate    Procedure Note   Procedure performed: microscopic examination of ears with cerumen disimpaction    Indication for procedure: unilateral cerumen impaction     Description of procedure:  After verbal consent was obtained, the patient was positioned in semi recumbent position and speculum was placed in the right ear and microscope brought into the field.  The microscope was positioned and magnification adjusted for appropriate visualization. Otologic instruments including various size otologic suctions and curette were used to remove the cerumen from the right external auditory canals under visualization with the operating microscope. After cleaning, visualization was again performed and at various levels of higher magnification to optimize views of the ear canal, tympanic membrane, ossicles and middle ear space. Findings as indicated below. All portions of the procedure and examination by otomicroscopy were tolerated well without complication.     Findings:  Right Complete cerumen impaction removed entirely revealing normal external  auditory canal; tympanic membrane without bulging, retraction, or perforation; no evidence of middle ear fluid or effusion.       Imaging:  The patient does not have any pertinent and/or recent imaging of the head and neck.     Labs:  CBC  Recent Labs   Lab 08/15/18  0725 03/26/19  0302   WBC 4.81 5.49   Hemoglobin 14.6 14.5   Hematocrit 43.1 43.8   MCV 86 87   Platelets 210 222     BMP  Recent Labs   Lab 08/15/18  0725 03/26/19  0302   Glucose 89 100   Sodium 140 142   Potassium 4.0 3.8   Chloride 106 107   CO2 26 28   BUN 15 14   Creatinine 0.9 1.0   Calcium 9.0 9.5     COAGS        Assessment  1. Snoring  - Ambulatory referral/consult to Sleep Disorders; Future  - COVID-19 Routine Screening; Future    2. Long uvula  - Ambulatory referral/consult to ENT  - COVID-19 Routine Screening; Future    3. Impacted cerumen of right ear    4. Seasonal allergic rhinitis, unspecified trigger    5. Hypertrophy of inferior nasal turbinate       Plan:  Discussed plan of care with patient in detail and all questions answered. Patient reported understanding of plan of care.     Allergic rhinitis(AR): Will start with flonase and saline. Discussed with pt that may need to start dual nasal spray therapy in the future as combo of steroid and antihistamine nasal spray has been shown in evidence based studies to be better than either alone. Discussed medication administration technique ( point toward outer corner of eye and not towards nasal septum) and use nasal saline prior to medication sprays. Counseled on risk of bleeding, risk of increased intraocular pressure/cataract with long term use    Elongated uvula: likely due to snoring ( uvula hits back of tongue causing elongation over time) . May have also isolated uvular infection previously (can be caused by diptheroids in adults) . Did not see evidence of infection today however.     Anatomy is risk for VALENTÍN and although he does not have a lot of symptoms of valentín with his cardiac  history , would recommend evaluation by sleep medicine to see if workup for possible VALENTÍN indicated. Pt is also interested in getting evaluation for this.      Cerumen impaction, right : removed, avoid qtips and ear buds. Debrox prn , can use hairdryer on low setting to dry ears after shower if water sensation is bothersome and leads to qtip usage    Follow up 3-4 months after seen by sleep medicine     covid screen prior in case needs scope for allergic rhinitis.

## 2020-12-07 NOTE — LETTER
December 13, 2020      Gómez Parisi MD  [address]  [place] [address]           [facility]  [address]   [place] [address]  Phone: [phone]          Patient: Garth Tejeda   MR Number: [id number]   YOB: 1980   Date of Visit: 12/7/2020       Dear Dr. Gómez Parisi:    Thank you for referring Garth Tejeda to me for evaluation. Attached you will find relevant portions of my assessment and plan of care.    If you have questions, please do not hesitate to call me. I look forward to following Garth Tejeda along with you.    Sincerely,    Brigida Salguero MD    Enclosure  CC:  No Recipients    If you would like to receive this communication electronically, please contact [email or web address] or [phone] to request more information on [employer] Link access.    For providers and/or their staff who would like to refer a patient to [facility], please contact us through our one-stop-shop provider referral line, [facility], at [phone].    If you feel you have received this communication in error or would no longer like to receive these types of communications, please e-mail [email or web address]

## 2020-12-15 ENCOUNTER — OFFICE VISIT (OUTPATIENT)
Dept: PULMONOLOGY | Facility: CLINIC | Age: 40
End: 2020-12-15
Payer: COMMERCIAL

## 2020-12-15 VITALS
DIASTOLIC BLOOD PRESSURE: 71 MMHG | TEMPERATURE: 98 F | HEIGHT: 68 IN | OXYGEN SATURATION: 98 % | SYSTOLIC BLOOD PRESSURE: 112 MMHG | WEIGHT: 265.75 LBS | HEART RATE: 86 BPM | BODY MASS INDEX: 40.28 KG/M2

## 2020-12-15 DIAGNOSIS — G47.30 SLEEP-RELATED BREATHING DISORDER: Primary | ICD-10-CM

## 2020-12-15 DIAGNOSIS — R06.83 SNORING: ICD-10-CM

## 2020-12-15 PROCEDURE — 3078F DIAST BP <80 MM HG: CPT | Mod: CPTII,S$GLB,, | Performed by: NURSE PRACTITIONER

## 2020-12-15 PROCEDURE — 99203 OFFICE O/P NEW LOW 30 MIN: CPT | Mod: S$GLB,,, | Performed by: NURSE PRACTITIONER

## 2020-12-15 PROCEDURE — 3074F PR MOST RECENT SYSTOLIC BLOOD PRESSURE < 130 MM HG: ICD-10-PCS | Mod: CPTII,S$GLB,, | Performed by: NURSE PRACTITIONER

## 2020-12-15 PROCEDURE — 3008F BODY MASS INDEX DOCD: CPT | Mod: CPTII,S$GLB,, | Performed by: NURSE PRACTITIONER

## 2020-12-15 PROCEDURE — 1126F AMNT PAIN NOTED NONE PRSNT: CPT | Mod: S$GLB,,, | Performed by: NURSE PRACTITIONER

## 2020-12-15 PROCEDURE — 3074F SYST BP LT 130 MM HG: CPT | Mod: CPTII,S$GLB,, | Performed by: NURSE PRACTITIONER

## 2020-12-15 PROCEDURE — 3078F PR MOST RECENT DIASTOLIC BLOOD PRESSURE < 80 MM HG: ICD-10-PCS | Mod: CPTII,S$GLB,, | Performed by: NURSE PRACTITIONER

## 2020-12-15 PROCEDURE — 99203 PR OFFICE/OUTPT VISIT, NEW, LEVL III, 30-44 MIN: ICD-10-PCS | Mod: S$GLB,,, | Performed by: NURSE PRACTITIONER

## 2020-12-15 PROCEDURE — 3008F PR BODY MASS INDEX (BMI) DOCUMENTED: ICD-10-PCS | Mod: CPTII,S$GLB,, | Performed by: NURSE PRACTITIONER

## 2020-12-15 PROCEDURE — 99999 PR PBB SHADOW E&M-EST. PATIENT-LVL IV: ICD-10-PCS | Mod: PBBFAC,,, | Performed by: NURSE PRACTITIONER

## 2020-12-15 PROCEDURE — 1126F PR PAIN SEVERITY QUANTIFIED, NO PAIN PRESENT: ICD-10-PCS | Mod: S$GLB,,, | Performed by: NURSE PRACTITIONER

## 2020-12-15 PROCEDURE — 99999 PR PBB SHADOW E&M-EST. PATIENT-LVL IV: CPT | Mod: PBBFAC,,, | Performed by: NURSE PRACTITIONER

## 2020-12-15 NOTE — PATIENT INSTRUCTIONS
Information regarding testing ordered by your provider today:    IN LAB, INITIAL DIAGNOSTIC STUDY:  Your provider has ordered a sleep study. This order has been sent to our billing/pre-service department to be approved by your insurance company. Once the study has been approved (this can take up to 14 business days depending on your insurance), we will call you to schedule an appointment. Once the appointment is scheduled, our Financial Clearance Call Center will be able to provide you with an anticipated out of pocket cost, such as a co-payment or unmet deductible amount. The Financial Clearance Call Center can be reached at [phone]. You may also call your insurance company directly and give them the procedure code CPT 08964 to receive an estimate of your out of pocket cost.     Education: During our discussion today, we talked about the etiology of obstructive sleep apnea as well as the potential ramifications of untreated sleep apnea, which could include daytime sleepiness, dementia, cognitive impairment, hypertension, heart disease and/or stroke. We discussed potential treatment options, which could include weight loss, body positioning, oral appliances (OA), continuous positive airway pressure (CPAP), or referral for surgical consideration.     Behavior modification which includes losing weight, exercising, changing the sleep position, abstaining from alcohol, and avoiding certain medications    Precautions: The patient was advised to abstain from driving should they feel sleepy or drowsy

## 2020-12-15 NOTE — PROGRESS NOTES
Garth Tejeda  was seen as a new patient at the request of Brigida Julio MD for the evaluation of  possible sleep apnea.    CHIEF COMPLAINT:    Chief Complaint   Patient presents with    Apnea       HISTORY OF PRESENT ILLNESS: Garth Tejeda is a 40 y.o. male with hx ASD s/p repair age 3 yo, CHF EF 40%, ADD, Anxiety, HTN, AR, HLD  is here for sleep evaluation. Patient with symptoms of  Daytime sleepiness ESS 8, snoring and fatigue .    Patient does not have: sleep paralysis, cataplexy, sleep talking, sleep walking,, other unusual sleep behavior, nightmares, RLS, hypnic hallucinations and bruxism.    SLEEP ROUTINE:  Time to bed:  2200  Sleep onset latency: 20 min      Disruptions or awakenings:   1-2 times dog or wife, without difficulty falling back to sleep  Wakeup time:      0600  Perceived sleep quality:  sometimes rest   Daytime naps:     no      PAST MEDICAL HISTORY:    Active Ambulatory Problems     Diagnosis Date Noted    History of congenital heart defect 12/31/2015    Diminished pulse 12/31/2015    Left subclavian artery occlusion 03/28/2016    Chest pain, atypical     Anxiety 02/22/2017    ADD (attention deficit disorder) 02/22/2017    Hypertension, essential 02/22/2017    Mild obesity 11/08/2017    Body water dehydration 09/23/2019    Sleep-related breathing disorder 12/20/2020     Resolved Ambulatory Problems     Diagnosis Date Noted    Compound fracture 04/04/2015     Past Medical History:   Diagnosis Date    Hypertension     Septal defect                 PAST SURGICAL HISTORY:    Past Surgical History:   Procedure Laterality Date    CARDIAC SURGERY      WRIST SURGERY      right         FAMILY HISTORY:                Family History   Problem Relation Age of Onset    Diabetes Mother     Hypertension Mother        SOCIAL HISTORY:          Tobacco:   Social History     Tobacco Use   Smoking Status Never Smoker   Smokeless Tobacco Never Used       alcohol use:    Social  History     Substance and Sexual Activity   Alcohol Use Yes    Comment: socially                      ALLERGIES:  Review of patient's allergies indicates:  No Known Allergies    CURRENT MEDICATIONS:    Current Outpatient Medications   Medication Sig Dispense Refill    (Magic mouthwash) 1:1:1 Benadryl 12.5mg/5ml liq, aluminum & magnesium hydroxide-simehticone (Maalox), lidocaine viscous 2% Swish and spit 15 mLs every 4 (four) hours as needed. for mouth sores 240 mL 0    aspirin (ECOTRIN) 81 MG EC tablet Take 81 mg by mouth once daily.      atorvastatin (LIPITOR) 10 MG tablet Take 1 tablet (10 mg total) by mouth once daily. 90 tablet 3    carvediloL (COREG) 12.5 MG tablet TAKE ONE TABLET BY MOUTH TWICE DAILY WITH MEALS FOR BLOOD PRESSURE 180 tablet 3    desloratadine (CLARINEX) 5 mg tablet Take 1 tablet (5 mg total) by mouth once daily. 30 tablet 11    dextroamphetamine-amphetamine 10 mg Tab TAKE ONE TABLET BY MOUTH TWICE DAILY (fill on 11/21/2019) 60 tablet 0    diazePAM (VALIUM) 10 MG Tab Take 1 tablet (10 mg total) by mouth 2 (two) times daily as needed. 60 tablet 2    fluticasone propionate (FLONASE) 50 mcg/actuation nasal spray 2 sprays (100 mcg total) by Each Nostril route every evening. 18.2 mL 3    lisinopriL 10 MG tablet TAKE ONE TABLET BY MOUTH TWICE DAILY FOR BLOOD PRESSURE 180 tablet 0    vilazodone (VIIBRYD) 20 mg Tab Take 1 tablet (20 mg total) by mouth once daily. 30 tablet 11     No current facility-administered medications for this visit.                   REVIEW OF SYSTEMS:   Review of Systems   Constitutional: Positive for fatigue. Negative for fever, chills, weight loss, weight gain, activity change, appetite change and night sweats.   HENT: Negative for congestion.         Long uvula- irritated, inflammed    Eyes: Negative.    Respiratory: Positive for snoring. Negative for cough, sputum production, chest tightness, wheezing, orthopnea, previous hospitialization due to pulmonary  "problems, dyspnea on extertion and use of rescue inhaler.    Cardiovascular: Negative for chest pain, palpitations and leg swelling.   Genitourinary: Negative.    Endocrine: endocrine negative   Musculoskeletal: Negative for arthralgias and gait problem.   Skin: Negative.    Gastrointestinal: Negative for acid reflux.   Neurological: Negative.    Psychiatric/Behavioral: Positive for sleep disturbance.        PHYSICAL EXAM:  Vitals:    12/15/20 1355   BP: 112/71   Pulse: 86   Temp: 98.3 °F (36.8 °C)   TempSrc: Oral   SpO2: 98%   Weight: 120.5 kg (265 lb 12.2 oz)   Height: 5' 8" (1.727 m)   PainSc: 0-No pain     Body mass index is 40.41 kg/m².   Physical Exam   Constitutional: He is oriented to person, place, and time. He appears well-developed. No distress. He is obese.   HENT:   Head: Normocephalic.   Nose: Nose normal.   Mouth/Throat: Oropharynx is clear and moist. Mallampati Score: III.   Neck: Neck supple.   17 in   Cardiovascular: Normal rate and regular rhythm.   Murmur heard.  Pulmonary/Chest: Normal expansion, effort normal and breath sounds normal.   Musculoskeletal:         General: No edema.   Neurological: He is alert and oriented to person, place, and time. Gait normal.   Skin: Skin is warm. He is not diaphoretic.   Psychiatric: He has a normal mood and affect. His behavior is normal. Judgment and thought content normal.                                               DATA:  TSH:  Lab Results   Component Value Date    TSH 3.676 08/15/2018     CBC:  Lab Results   Component Value Date    WBC 5.49 03/26/2019    HGB 14.5 03/26/2019    HCT 43.8 03/26/2019    MCV 87 03/26/2019     03/26/2019     BMP:  Lab Results   Component Value Date     03/26/2019    K 3.8 03/26/2019     03/26/2019    CO2 28 03/26/2019    BUN 14 03/26/2019    CREATININE 1.0 03/26/2019    CALCIUM 9.5 03/26/2019    ANIONGAP 7 (L) 03/26/2019    ESTGFRAFRICA >60 03/26/2019    EGFRNONAA >60 03/26/2019     HgbA1C:  Lab Results "   Component Value Date    HGBA1C 5.4 08/15/2018          ASSESSMENT    ICD-10-CM ICD-9-CM    1. Sleep-related breathing disorder  G47.30 780.59 Polysomnogram (CPAP will be added if patient meets diagnostic criteria.)   2. Snoring  R06.83 786.09 Ambulatory referral/consult to Sleep Disorders       PLAN:    Problem List Items Addressed This Visit        Unprioritized    Sleep-related breathing disorder - Primary    Overview     Patient with snoring, difficulty maintaining sleep, fatigue, daytime sleepiness with finding thick neck, crowded airway, obesity with comorbid CHF. Recommend in lab study.         Relevant Orders    Polysomnogram (CPAP will be added if patient meets diagnostic criteria.)      Other Visit Diagnoses     Snoring              Thank you for allowing me the opportunity to participate in the care of your patient.    Patient will Follow up for will call for you to return once we obtain result.       Please cc note to  Brigida Julio MD.

## 2020-12-15 NOTE — LETTER
December 20, 2020      Brigida Salguero MD  [address] [facility] [address]  [place] [address]           [facility]  [address]  [place] [address]  Phone: [phone]  Fax: [phone]          Patient: Garth Tejeda   MR Number: [id number]   YOB: 1980   Date of Visit: 12/15/2020       Dear Dr. Brigida Salguero:    Thank you for referring Garth Tejeda to me for evaluation. Attached you will find relevant portions of my assessment and plan of care.    If you have questions, please do not hesitate to call me. I look forward to following Garth Tejeda along with you.    Sincerely,    Lashay Thayer, LULY    Enclosure  CC:  No Recipients    If you would like to receive this communication electronically, please contact [email or web address] or [phone] to request more information on [employer] Link access.    For providers and/or their staff who would like to refer a patient to [facility], please contact us through our one-stop-shop provider referral line, [facility], at [phone].    If you feel you have received this communication in error or would no longer like to receive these types of communications, please e-mail [email or web address]

## 2020-12-20 PROBLEM — G47.30 SLEEP-RELATED BREATHING DISORDER: Status: ACTIVE | Noted: 2020-12-20

## 2020-12-28 ENCOUNTER — PATIENT MESSAGE (OUTPATIENT)
Dept: FAMILY MEDICINE | Facility: CLINIC | Age: 40
End: 2020-12-28

## 2020-12-29 ENCOUNTER — OFFICE VISIT (OUTPATIENT)
Dept: FAMILY MEDICINE | Facility: CLINIC | Age: 40
End: 2020-12-29
Payer: COMMERCIAL

## 2020-12-29 DIAGNOSIS — J30.9 ALLERGIC RHINITIS, UNSPECIFIED SEASONALITY, UNSPECIFIED TRIGGER: Primary | ICD-10-CM

## 2020-12-29 DIAGNOSIS — J06.9 UPPER RESPIRATORY TRACT INFECTION, UNSPECIFIED TYPE: ICD-10-CM

## 2020-12-29 PROCEDURE — 99214 PR OFFICE/OUTPT VISIT, EST, LEVL IV, 30-39 MIN: ICD-10-PCS | Mod: 95,,, | Performed by: PHYSICIAN ASSISTANT

## 2020-12-29 PROCEDURE — 99214 OFFICE O/P EST MOD 30 MIN: CPT | Mod: 95,,, | Performed by: PHYSICIAN ASSISTANT

## 2020-12-29 RX ORDER — ALBUTEROL SULFATE 90 UG/1
2 AEROSOL, METERED RESPIRATORY (INHALATION) EVERY 6 HOURS PRN
Qty: 18 G | Refills: 0 | Status: SHIPPED | OUTPATIENT
Start: 2020-12-29 | End: 2021-01-05 | Stop reason: SDUPTHER

## 2020-12-29 RX ORDER — DESLORATADINE 5 MG/1
5 TABLET ORAL DAILY
Qty: 30 TABLET | Refills: 11 | Status: SHIPPED | OUTPATIENT
Start: 2020-12-29 | End: 2022-04-07 | Stop reason: SDUPTHER

## 2020-12-29 NOTE — PROGRESS NOTES
Answers for HPI/ROS submitted by the patient on 12/29/2020   Sore throat  Chronicity: new  Onset: in the past 7 days  Progression since onset: resolved  Pain worse on: neither  Fever: no fever  Pain - numeric: 5/10  abdominal pain: No  congestion: Yes  cough: Yes  diarrhea: No  drooling: No  ear discharge: No  ear pain: No  headaches: Yes  hoarse voice: No  neck pain: No  plugged ear sensation: No  shortness of breath: No  stridor: Yes  swollen glands: No  trouble swallowing: Yes  vomiting: No  strep: No  mono: No  Treatments tried: gargles  Improvement on treatment: significant  Pain severity: no pain

## 2020-12-29 NOTE — PROGRESS NOTES
Subjective:       Patient ID: Garth Tejeda is a 40 y.o. male with multiple medical diagnoses as listed in the medical history and problem list that presents for COVID-19 Concerns  .    Chief Complaint: COVID-19 Concerns    The patient location is: at home  The chief complaint leading to consultation is: uri  Visit type: Virtual visit with synchronous audio and video  Quality of audio: good  Quality of sound: good   Each patient to whom he or she provides medical services by telemedicine is:  (1) informed of the relationship between the physician and patient and the respective role of any other health care provider with respect to management of the patient; and (2) notified that he or she may decline to receive medical services by telemedicine and may withdraw from such care at any time.    Sore Throat   This is a new problem. The current episode started in the past 7 days. The problem has been resolved. Neither side of throat is experiencing more pain than the other. There has been no fever. The pain is at a severity of 5/10. The patient is experiencing no pain. Associated symptoms include congestion (improved ), coughing (cmas jessica--dry and productive; same throughout the day ), headaches and trouble swallowing (resolved ). Pertinent negatives include no abdominal pain, diarrhea, drooling, ear discharge, ear pain, hoarse voice, plugged ear sensation, neck pain, shortness of breath, stridor, swollen glands or vomiting. He has had no exposure to strep or mono. He has tried gargles (flonase,  magic mouth wash, OTC afrin --out of clarinex x 1 month ) for the symptoms. The treatment provided significant relief.     Pt had both negative rapid and PCR. Got PCR results yesterday evening and took that test on the 26th.   He is overall feeling better than yesterday which seems to be the worse day.    Review of Systems   Constitutional: Positive for fatigue. Negative for appetite change, chills and fever.   HENT: Positive  for congestion (improved ), postnasal drip, rhinorrhea, sneezing, sore throat (cmas even night --resolved ) and trouble swallowing (resolved ). Negative for drooling, ear discharge, ear pain, hoarse voice, sinus pressure and sinus pain.         No loss of taste or smell    Eyes: Positive for discharge and itching. Negative for pain and redness.   Respiratory: Positive for cough (cmas jessica--dry and productive; same throughout the day ) and chest tightness (states only at rest when he is thinking about it but if he is busy he is ok ). Negative for shortness of breath, wheezing and stridor.    Gastrointestinal: Negative for abdominal pain, constipation, diarrhea and vomiting.   Musculoskeletal: Negative for myalgias and neck pain.   Neurological: Positive for headaches.         PAST MEDICAL HISTORY:  Past Medical History:   Diagnosis Date    Anxiety     Hypertension     Septal defect        SOCIAL HISTORY:  Social History     Socioeconomic History    Marital status:      Spouse name: Not on file    Number of children: Not on file    Years of education: Not on file    Highest education level: Not on file   Occupational History    Not on file   Social Needs    Financial resource strain: Not on file    Food insecurity     Worry: Not on file     Inability: Not on file    Transportation needs     Medical: Not on file     Non-medical: Not on file   Tobacco Use    Smoking status: Never Smoker    Smokeless tobacco: Never Used   Substance and Sexual Activity    Alcohol use: Yes     Comment: socially     Drug use: Yes     Types: Marijuana    Sexual activity: Yes     Partners: Female   Lifestyle    Physical activity     Days per week: Not on file     Minutes per session: Not on file    Stress: Not at all   Relationships    Social connections     Talks on phone: Not on file     Gets together: Not on file     Attends Baptism service: Not on file     Active member of club or organization: Not on file      Attends meetings of clubs or organizations: Not on file     Relationship status: Not on file   Other Topics Concern    Not on file   Social History Narrative    HAS [employer] business       ALLERGIES AND MEDICATIONS: updated and reviewed.  Review of patient's allergies indicates:  No Known Allergies  Current Outpatient Medications   Medication Sig Dispense Refill    (Magic mouthwash) 1:1:1 Benadryl 12.5mg/5ml liq, aluminum & magnesium hydroxide-simehticone (Maalox), lidocaine viscous 2% Swish and spit 15 mLs every 4 (four) hours as needed. for mouth sores 240 mL 0    aspirin (ECOTRIN) 81 MG EC tablet Take 81 mg by mouth once daily.      atorvastatin (LIPITOR) 10 MG tablet Take 1 tablet (10 mg total) by mouth once daily. 90 tablet 3    carvediloL (COREG) 12.5 MG tablet TAKE ONE TABLET BY MOUTH TWICE DAILY WITH MEALS FOR BLOOD PRESSURE 180 tablet 3    desloratadine (CLARINEX) 5 mg tablet Take 1 tablet (5 mg total) by mouth once daily. 30 tablet 11    dextroamphetamine-amphetamine 10 mg Tab TAKE ONE TABLET BY MOUTH TWICE DAILY (fill on 11/21/2019) 60 tablet 0    diazePAM (VALIUM) 10 MG Tab Take 1 tablet (10 mg total) by mouth 2 (two) times daily as needed. 60 tablet 2    fluticasone propionate (FLONASE) 50 mcg/actuation nasal spray 2 sprays (100 mcg total) by Each Nostril route every evening. 18.2 mL 3    lisinopriL 10 MG tablet TAKE ONE TABLET BY MOUTH TWICE DAILY FOR BLOOD PRESSURE 180 tablet 0    vilazodone (VIIBRYD) 20 mg Tab Take 1 tablet (20 mg total) by mouth once daily. 30 tablet 11    albuterol (PROVENTIL/VENTOLIN HFA) 90 mcg/actuation inhaler Inhale 2 puffs into the lungs every 6 (six) hours as needed for Wheezing. 18 g 0     No current facility-administered medications for this visit.          Objective:   There were no vitals taken for this visit.     Physical Exam  HENT:      Head: Normocephalic and atraumatic.      Mouth/Throat:      Comments: Clearing throat alot  Eyes:      Extraocular Movements:  Extraocular movements intact.   Pulmonary:      Comments: No cough during visit   Neurological:      Mental Status: He is alert and oriented to person, place, and time.   Psychiatric:         Mood and Affect: Mood normal.         Behavior: Behavior normal.             Assessment:       1. Allergic rhinitis, unspecified seasonality, unspecified trigger    2. Upper respiratory tract infection, unspecified type        Plan:       Allergic rhinitis, unspecified seasonality, unspecified trigger  -     desloratadine (CLARINEX) 5 mg tablet; Take 1 tablet (5 mg total) by mouth once daily.  Dispense: 30 tablet; Refill: 11    Upper respiratory tract infection, unspecified type  -     desloratadine (CLARINEX) 5 mg tablet; Take 1 tablet (5 mg total) by mouth once daily.  Dispense: 30 tablet; Refill: 11  -     albuterol (PROVENTIL/VENTOLIN HFA) 90 mcg/actuation inhaler; Inhale 2 puffs into the lungs every 6 (six) hours as needed for Wheezing.  Dispense: 18 g; Refill: 0    Call for fever 100.4 or higher, change in discharge color, no improvement in 7-10 days.   Or if worsening of chest tightness. Will repeat covid test if so.       Consult Start Time: 12/29/2020 07:19  Consult End Time: 12/29/2020 07:30                No follow-ups on file.

## 2021-01-05 ENCOUNTER — OFFICE VISIT (OUTPATIENT)
Dept: FAMILY MEDICINE | Facility: CLINIC | Age: 41
End: 2021-01-05
Payer: COMMERCIAL

## 2021-01-05 VITALS
BODY MASS INDEX: 38.76 KG/M2 | TEMPERATURE: 97 F | SYSTOLIC BLOOD PRESSURE: 120 MMHG | HEIGHT: 68 IN | OXYGEN SATURATION: 97 % | DIASTOLIC BLOOD PRESSURE: 80 MMHG | HEART RATE: 76 BPM | WEIGHT: 255.75 LBS

## 2021-01-05 DIAGNOSIS — B96.89 BACTERIAL SINUSITIS: Primary | ICD-10-CM

## 2021-01-05 DIAGNOSIS — J02.9 PHARYNGITIS, UNSPECIFIED ETIOLOGY: ICD-10-CM

## 2021-01-05 DIAGNOSIS — J06.9 UPPER RESPIRATORY TRACT INFECTION, UNSPECIFIED TYPE: ICD-10-CM

## 2021-01-05 DIAGNOSIS — F98.8 ATTENTION DEFICIT DISORDER, UNSPECIFIED HYPERACTIVITY PRESENCE: ICD-10-CM

## 2021-01-05 DIAGNOSIS — F41.9 ANXIETY: ICD-10-CM

## 2021-01-05 DIAGNOSIS — J32.9 BACTERIAL SINUSITIS: Primary | ICD-10-CM

## 2021-01-05 PROCEDURE — 3079F DIAST BP 80-89 MM HG: CPT | Mod: CPTII,S$GLB,, | Performed by: FAMILY MEDICINE

## 2021-01-05 PROCEDURE — 3008F PR BODY MASS INDEX (BMI) DOCUMENTED: ICD-10-PCS | Mod: CPTII,S$GLB,, | Performed by: FAMILY MEDICINE

## 2021-01-05 PROCEDURE — 3008F BODY MASS INDEX DOCD: CPT | Mod: CPTII,S$GLB,, | Performed by: FAMILY MEDICINE

## 2021-01-05 PROCEDURE — 99999 PR PBB SHADOW E&M-EST. PATIENT-LVL IV: CPT | Mod: PBBFAC,,, | Performed by: FAMILY MEDICINE

## 2021-01-05 PROCEDURE — 3074F PR MOST RECENT SYSTOLIC BLOOD PRESSURE < 130 MM HG: ICD-10-PCS | Mod: CPTII,S$GLB,, | Performed by: FAMILY MEDICINE

## 2021-01-05 PROCEDURE — 99214 OFFICE O/P EST MOD 30 MIN: CPT | Mod: 25,S$GLB,, | Performed by: FAMILY MEDICINE

## 2021-01-05 PROCEDURE — 96372 PR INJECTION,THERAP/PROPH/DIAG2ST, IM OR SUBCUT: ICD-10-PCS | Mod: S$GLB,,, | Performed by: FAMILY MEDICINE

## 2021-01-05 PROCEDURE — 96372 THER/PROPH/DIAG INJ SC/IM: CPT | Mod: S$GLB,,, | Performed by: FAMILY MEDICINE

## 2021-01-05 PROCEDURE — 99999 PR PBB SHADOW E&M-EST. PATIENT-LVL IV: ICD-10-PCS | Mod: PBBFAC,,, | Performed by: FAMILY MEDICINE

## 2021-01-05 PROCEDURE — 3074F SYST BP LT 130 MM HG: CPT | Mod: CPTII,S$GLB,, | Performed by: FAMILY MEDICINE

## 2021-01-05 PROCEDURE — 1126F AMNT PAIN NOTED NONE PRSNT: CPT | Mod: S$GLB,,, | Performed by: FAMILY MEDICINE

## 2021-01-05 PROCEDURE — 99214 PR OFFICE/OUTPT VISIT, EST, LEVL IV, 30-39 MIN: ICD-10-PCS | Mod: 25,S$GLB,, | Performed by: FAMILY MEDICINE

## 2021-01-05 PROCEDURE — 3079F PR MOST RECENT DIASTOLIC BLOOD PRESSURE 80-89 MM HG: ICD-10-PCS | Mod: CPTII,S$GLB,, | Performed by: FAMILY MEDICINE

## 2021-01-05 PROCEDURE — 1126F PR PAIN SEVERITY QUANTIFIED, NO PAIN PRESENT: ICD-10-PCS | Mod: S$GLB,,, | Performed by: FAMILY MEDICINE

## 2021-01-05 RX ORDER — ALBUTEROL SULFATE 90 UG/1
2 AEROSOL, METERED RESPIRATORY (INHALATION) EVERY 6 HOURS PRN
Qty: 18 G | Refills: 11 | Status: SHIPPED | OUTPATIENT
Start: 2021-01-05 | End: 2023-02-08 | Stop reason: SDUPTHER

## 2021-01-05 RX ORDER — DIAZEPAM 10 MG/1
10 TABLET ORAL 2 TIMES DAILY PRN
Qty: 60 TABLET | Refills: 2 | Status: SHIPPED | OUTPATIENT
Start: 2021-01-05 | End: 2021-06-21 | Stop reason: SDUPTHER

## 2021-01-05 RX ORDER — DEXTROAMPHETAMINE SACCHARATE, AMPHETAMINE ASPARTATE, DEXTROAMPHETAMINE SULFATE AND AMPHETAMINE SULFATE 2.5; 2.5; 2.5; 2.5 MG/1; MG/1; MG/1; MG/1
TABLET ORAL
Qty: 60 TABLET | Refills: 0 | Status: SHIPPED | OUTPATIENT
Start: 2021-02-21 | End: 2021-10-26 | Stop reason: SDUPTHER

## 2021-01-05 RX ORDER — AMOXICILLIN AND CLAVULANATE POTASSIUM 875; 125 MG/1; MG/1
1 TABLET, FILM COATED ORAL EVERY 12 HOURS
Qty: 14 TABLET | Refills: 0 | Status: SHIPPED | OUTPATIENT
Start: 2021-01-05 | End: 2021-01-12

## 2021-01-05 RX ORDER — METHYLPREDNISOLONE ACETATE 80 MG/ML
80 INJECTION, SUSPENSION INTRA-ARTICULAR; INTRALESIONAL; INTRAMUSCULAR; SOFT TISSUE
Status: COMPLETED | OUTPATIENT
Start: 2021-01-05 | End: 2021-01-05

## 2021-01-05 RX ORDER — DEXTROAMPHETAMINE SACCHARATE, AMPHETAMINE ASPARTATE, DEXTROAMPHETAMINE SULFATE AND AMPHETAMINE SULFATE 2.5; 2.5; 2.5; 2.5 MG/1; MG/1; MG/1; MG/1
TABLET ORAL
Qty: 60 TABLET | Refills: 0 | Status: SHIPPED | OUTPATIENT
Start: 2021-03-19 | End: 2021-03-05 | Stop reason: SDUPTHER

## 2021-01-05 RX ADMIN — METHYLPREDNISOLONE ACETATE 80 MG: 80 INJECTION, SUSPENSION INTRA-ARTICULAR; INTRALESIONAL; INTRAMUSCULAR; SOFT TISSUE at 09:01

## 2021-01-07 ENCOUNTER — TELEPHONE (OUTPATIENT)
Dept: SLEEP MEDICINE | Facility: HOSPITAL | Age: 41
End: 2021-01-07

## 2021-02-01 ENCOUNTER — HOSPITAL ENCOUNTER (OUTPATIENT)
Dept: SLEEP MEDICINE | Facility: HOSPITAL | Age: 41
Discharge: HOME | End: 2021-02-01
Attending: NURSE PRACTITIONER
Payer: COMMERCIAL

## 2021-02-01 DIAGNOSIS — G47.30 SLEEP-RELATED BREATHING DISORDER: ICD-10-CM

## 2021-02-01 PROCEDURE — 95810 POLYSOM 6/> YRS 4/> PARAM: CPT | Mod: 26,,, | Performed by: INTERNAL MEDICINE

## 2021-02-01 PROCEDURE — 95810 PR POLYSOMNOGRAPHY, 4 OR MORE: ICD-10-PCS | Mod: 26,,, | Performed by: INTERNAL MEDICINE

## 2021-02-01 PROCEDURE — 95810 POLYSOM 6/> YRS 4/> PARAM: CPT

## 2021-02-10 ENCOUNTER — TELEPHONE (OUTPATIENT)
Dept: PULMONOLOGY | Facility: CLINIC | Age: 41
End: 2021-02-10

## 2021-02-15 ENCOUNTER — PATIENT MESSAGE (OUTPATIENT)
Dept: PULMONOLOGY | Facility: CLINIC | Age: 41
End: 2021-02-15

## 2021-02-15 ENCOUNTER — PATIENT OUTREACH (OUTPATIENT)
Dept: ADMINISTRATIVE | Facility: OTHER | Age: 41
End: 2021-02-15

## 2021-02-15 ENCOUNTER — HOSPITAL ENCOUNTER (EMERGENCY)
Facility: HOSPITAL | Age: 41
Discharge: HOME | End: 2021-02-15
Attending: EMERGENCY MEDICINE
Payer: COMMERCIAL

## 2021-02-15 ENCOUNTER — TELEPHONE (OUTPATIENT)
Dept: PULMONOLOGY | Facility: CLINIC | Age: 41
End: 2021-02-15

## 2021-02-15 VITALS
RESPIRATION RATE: 18 BRPM | HEART RATE: 69 BPM | SYSTOLIC BLOOD PRESSURE: 128 MMHG | TEMPERATURE: 98 F | OXYGEN SATURATION: 96 % | DIASTOLIC BLOOD PRESSURE: 72 MMHG | BODY MASS INDEX: 37.89 KG/M2 | HEIGHT: 68 IN | WEIGHT: 250 LBS

## 2021-02-15 DIAGNOSIS — R06.02 SHORTNESS OF BREATH: ICD-10-CM

## 2021-02-15 DIAGNOSIS — F41.1 ANXIETY REACTION: Primary | ICD-10-CM

## 2021-02-15 DIAGNOSIS — R55 PRE-SYNCOPE: ICD-10-CM

## 2021-02-15 DIAGNOSIS — R11.0 NAUSEA: ICD-10-CM

## 2021-02-15 LAB
ALBUMIN SERPL BCP-MCNC: 4.3 G/DL (ref 3.5–5.2)
ALP SERPL-CCNC: 96 U/L (ref 55–135)
ALT SERPL W/O P-5'-P-CCNC: 27 U/L (ref 10–44)
ANION GAP SERPL CALC-SCNC: 10 MMOL/L (ref 8–16)
AST SERPL-CCNC: 21 U/L (ref 10–40)
BASOPHILS # BLD AUTO: 0.05 K/UL (ref 0–0.2)
BASOPHILS NFR BLD: 0.8 % (ref 0–1.9)
BILIRUB SERPL-MCNC: 0.4 MG/DL (ref 0.1–1)
BNP SERPL-MCNC: 38 PG/ML (ref 0–99)
BUN SERPL-MCNC: 11 MG/DL (ref 6–20)
CALCIUM SERPL-MCNC: 9.1 MG/DL (ref 8.7–10.5)
CHLORIDE SERPL-SCNC: 104 MMOL/L (ref 95–110)
CO2 SERPL-SCNC: 27 MMOL/L (ref 23–29)
CREAT SERPL-MCNC: 0.9 MG/DL (ref 0.5–1.4)
CTP QC/QA: YES
CTP QC/QA: YES
D DIMER PPP IA.FEU-MCNC: 0.23 MG/L FEU
DIFFERENTIAL METHOD: NORMAL
EOSINOPHIL # BLD AUTO: 0.1 K/UL (ref 0–0.5)
EOSINOPHIL NFR BLD: 1.1 % (ref 0–8)
ERYTHROCYTE [DISTWIDTH] IN BLOOD BY AUTOMATED COUNT: 13.9 % (ref 11.5–14.5)
EST. GFR  (AFRICAN AMERICAN): >60 ML/MIN/1.73 M^2
EST. GFR  (NON AFRICAN AMERICAN): >60 ML/MIN/1.73 M^2
GLUCOSE SERPL-MCNC: 94 MG/DL (ref 70–110)
HCT VFR BLD AUTO: 42.2 % (ref 40–54)
HGB BLD-MCNC: 14.3 G/DL (ref 14–18)
IMM GRANULOCYTES # BLD AUTO: 0.02 K/UL (ref 0–0.04)
IMM GRANULOCYTES NFR BLD AUTO: 0.3 % (ref 0–0.5)
LYMPHOCYTES # BLD AUTO: 1.6 K/UL (ref 1–4.8)
LYMPHOCYTES NFR BLD: 24.1 % (ref 18–48)
MCH RBC QN AUTO: 28.9 PG (ref 27–31)
MCHC RBC AUTO-ENTMCNC: 33.9 G/DL (ref 32–36)
MCV RBC AUTO: 85 FL (ref 82–98)
MONOCYTES # BLD AUTO: 0.6 K/UL (ref 0.3–1)
MONOCYTES NFR BLD: 9.8 % (ref 4–15)
NEUTROPHILS # BLD AUTO: 4.1 K/UL (ref 1.8–7.7)
NEUTROPHILS NFR BLD: 63.9 % (ref 38–73)
NRBC BLD-RTO: 0 /100 WBC
PLATELET # BLD AUTO: 235 K/UL (ref 150–350)
PMV BLD AUTO: 9.8 FL (ref 9.2–12.9)
POC MOLECULAR INFLUENZA A AGN: NEGATIVE
POC MOLECULAR INFLUENZA B AGN: NEGATIVE
POTASSIUM SERPL-SCNC: 4.6 MMOL/L (ref 3.5–5.1)
PROT SERPL-MCNC: 7.7 G/DL (ref 6–8.4)
RBC # BLD AUTO: 4.94 M/UL (ref 4.6–6.2)
SARS-COV-2 RDRP RESP QL NAA+PROBE: NEGATIVE
SODIUM SERPL-SCNC: 141 MMOL/L (ref 136–145)
TROPONIN I SERPL DL<=0.01 NG/ML-MCNC: <0.006 NG/ML (ref 0–0.03)
WBC # BLD AUTO: 6.42 K/UL (ref 3.9–12.7)

## 2021-02-15 PROCEDURE — 85025 COMPLETE CBC W/AUTO DIFF WBC: CPT

## 2021-02-15 PROCEDURE — 85379 FIBRIN DEGRADATION QUANT: CPT

## 2021-02-15 PROCEDURE — 93005 ELECTROCARDIOGRAM TRACING: CPT

## 2021-02-15 PROCEDURE — 83880 ASSAY OF NATRIURETIC PEPTIDE: CPT

## 2021-02-15 PROCEDURE — 99285 EMERGENCY DEPT VISIT HI MDM: CPT | Mod: 25

## 2021-02-15 PROCEDURE — 96372 THER/PROPH/DIAG INJ SC/IM: CPT | Mod: 59

## 2021-02-15 PROCEDURE — 84484 ASSAY OF TROPONIN QUANT: CPT

## 2021-02-15 PROCEDURE — 96375 TX/PRO/DX INJ NEW DRUG ADDON: CPT

## 2021-02-15 PROCEDURE — 63600175 PHARM REV CODE 636 W HCPCS: Performed by: NURSE PRACTITIONER

## 2021-02-15 PROCEDURE — 93010 EKG 12-LEAD: ICD-10-PCS | Mod: ,,, | Performed by: INTERNAL MEDICINE

## 2021-02-15 PROCEDURE — 87502 INFLUENZA DNA AMP PROBE: CPT

## 2021-02-15 PROCEDURE — 96374 THER/PROPH/DIAG INJ IV PUSH: CPT

## 2021-02-15 PROCEDURE — U0002 COVID-19 LAB TEST NON-CDC: HCPCS | Performed by: NURSE PRACTITIONER

## 2021-02-15 PROCEDURE — 93010 ELECTROCARDIOGRAM REPORT: CPT | Mod: ,,, | Performed by: INTERNAL MEDICINE

## 2021-02-15 PROCEDURE — 80053 COMPREHEN METABOLIC PANEL: CPT

## 2021-02-15 RX ORDER — ONDANSETRON 2 MG/ML
8 INJECTION INTRAMUSCULAR; INTRAVENOUS
Status: COMPLETED | OUTPATIENT
Start: 2021-02-15 | End: 2021-02-15

## 2021-02-15 RX ORDER — HALOPERIDOL 5 MG/ML
5 INJECTION INTRAMUSCULAR
Status: COMPLETED | OUTPATIENT
Start: 2021-02-15 | End: 2021-02-15

## 2021-02-15 RX ORDER — LORAZEPAM 2 MG/ML
1 INJECTION INTRAMUSCULAR
Status: COMPLETED | OUTPATIENT
Start: 2021-02-15 | End: 2021-02-15

## 2021-02-15 RX ADMIN — HALOPERIDOL LACTATE 5 MG: 5 INJECTION, SOLUTION INTRAMUSCULAR at 12:02

## 2021-02-15 RX ADMIN — LORAZEPAM 1 MG: 2 INJECTION INTRAMUSCULAR; INTRAVENOUS at 11:02

## 2021-02-15 RX ADMIN — ONDANSETRON 8 MG: 2 INJECTION INTRAMUSCULAR; INTRAVENOUS at 11:02

## 2021-02-19 ENCOUNTER — OFFICE VISIT (OUTPATIENT)
Dept: PULMONOLOGY | Facility: CLINIC | Age: 41
End: 2021-02-19
Payer: COMMERCIAL

## 2021-02-19 DIAGNOSIS — G47.33 OSA (OBSTRUCTIVE SLEEP APNEA): Primary | ICD-10-CM

## 2021-02-19 PROCEDURE — 99213 PR OFFICE/OUTPT VISIT, EST, LEVL III, 20-29 MIN: ICD-10-PCS | Mod: 95,,, | Performed by: NURSE PRACTITIONER

## 2021-02-19 PROCEDURE — 99213 OFFICE O/P EST LOW 20 MIN: CPT | Mod: 95,,, | Performed by: NURSE PRACTITIONER

## 2021-02-28 PROCEDURE — 93005 ELECTROCARDIOGRAM TRACING: CPT

## 2021-02-28 PROCEDURE — 93010 ELECTROCARDIOGRAM REPORT: CPT | Mod: ,,, | Performed by: INTERNAL MEDICINE

## 2021-02-28 PROCEDURE — 93010 EKG 12-LEAD: ICD-10-PCS | Mod: ,,, | Performed by: INTERNAL MEDICINE

## 2021-02-28 PROCEDURE — 99283 EMERGENCY DEPT VISIT LOW MDM: CPT | Mod: 25

## 2021-03-01 ENCOUNTER — PATIENT MESSAGE (OUTPATIENT)
Dept: FAMILY MEDICINE | Facility: CLINIC | Age: 41
End: 2021-03-01

## 2021-03-01 ENCOUNTER — HOSPITAL ENCOUNTER (EMERGENCY)
Facility: HOSPITAL | Age: 41
Discharge: HOME | End: 2021-03-01
Attending: EMERGENCY MEDICINE
Payer: COMMERCIAL

## 2021-03-01 VITALS
RESPIRATION RATE: 17 BRPM | BODY MASS INDEX: 39.4 KG/M2 | WEIGHT: 260 LBS | HEIGHT: 68 IN | TEMPERATURE: 99 F | SYSTOLIC BLOOD PRESSURE: 147 MMHG | OXYGEN SATURATION: 96 % | DIASTOLIC BLOOD PRESSURE: 93 MMHG | HEART RATE: 60 BPM

## 2021-03-01 DIAGNOSIS — R53.1 GENERALIZED WEAKNESS: Primary | ICD-10-CM

## 2021-03-01 DIAGNOSIS — R53.1 WEAKNESS: ICD-10-CM

## 2021-03-02 ENCOUNTER — DOCUMENTATION ONLY (OUTPATIENT)
Dept: OTOLARYNGOLOGY | Facility: CLINIC | Age: 41
End: 2021-03-02

## 2021-03-05 ENCOUNTER — OFFICE VISIT (OUTPATIENT)
Dept: FAMILY MEDICINE | Facility: CLINIC | Age: 41
End: 2021-03-05
Payer: COMMERCIAL

## 2021-03-05 VITALS
WEIGHT: 257.94 LBS | OXYGEN SATURATION: 97 % | BODY MASS INDEX: 39.09 KG/M2 | SYSTOLIC BLOOD PRESSURE: 110 MMHG | TEMPERATURE: 97 F | HEIGHT: 68 IN | DIASTOLIC BLOOD PRESSURE: 70 MMHG | HEART RATE: 85 BPM

## 2021-03-05 DIAGNOSIS — I70.8 LEFT SUBCLAVIAN ARTERY OCCLUSION: ICD-10-CM

## 2021-03-05 DIAGNOSIS — F98.8 ATTENTION DEFICIT DISORDER, UNSPECIFIED HYPERACTIVITY PRESENCE: Primary | ICD-10-CM

## 2021-03-05 DIAGNOSIS — I10 HYPERTENSION, ESSENTIAL: ICD-10-CM

## 2021-03-05 DIAGNOSIS — Z87.74 HISTORY OF CONGENITAL HEART DEFECT: ICD-10-CM

## 2021-03-05 PROCEDURE — 99999 PR PBB SHADOW E&M-EST. PATIENT-LVL IV: CPT | Mod: PBBFAC,,, | Performed by: FAMILY MEDICINE

## 2021-03-05 PROCEDURE — 99215 OFFICE O/P EST HI 40 MIN: CPT | Mod: S$GLB,,, | Performed by: FAMILY MEDICINE

## 2021-03-05 PROCEDURE — 3008F BODY MASS INDEX DOCD: CPT | Mod: CPTII,S$GLB,, | Performed by: FAMILY MEDICINE

## 2021-03-05 PROCEDURE — 1126F PR PAIN SEVERITY QUANTIFIED, NO PAIN PRESENT: ICD-10-PCS | Mod: S$GLB,,, | Performed by: FAMILY MEDICINE

## 2021-03-05 PROCEDURE — 3074F SYST BP LT 130 MM HG: CPT | Mod: CPTII,S$GLB,, | Performed by: FAMILY MEDICINE

## 2021-03-05 PROCEDURE — 99999 PR PBB SHADOW E&M-EST. PATIENT-LVL IV: ICD-10-PCS | Mod: PBBFAC,,, | Performed by: FAMILY MEDICINE

## 2021-03-05 PROCEDURE — 1126F AMNT PAIN NOTED NONE PRSNT: CPT | Mod: S$GLB,,, | Performed by: FAMILY MEDICINE

## 2021-03-05 PROCEDURE — 99215 PR OFFICE/OUTPT VISIT, EST, LEVL V, 40-54 MIN: ICD-10-PCS | Mod: S$GLB,,, | Performed by: FAMILY MEDICINE

## 2021-03-05 PROCEDURE — 3078F DIAST BP <80 MM HG: CPT | Mod: CPTII,S$GLB,, | Performed by: FAMILY MEDICINE

## 2021-03-05 PROCEDURE — 3008F PR BODY MASS INDEX (BMI) DOCUMENTED: ICD-10-PCS | Mod: CPTII,S$GLB,, | Performed by: FAMILY MEDICINE

## 2021-03-05 PROCEDURE — 3078F PR MOST RECENT DIASTOLIC BLOOD PRESSURE < 80 MM HG: ICD-10-PCS | Mod: CPTII,S$GLB,, | Performed by: FAMILY MEDICINE

## 2021-03-05 PROCEDURE — 3074F PR MOST RECENT SYSTOLIC BLOOD PRESSURE < 130 MM HG: ICD-10-PCS | Mod: CPTII,S$GLB,, | Performed by: FAMILY MEDICINE

## 2021-03-05 RX ORDER — DEXTROAMPHETAMINE SACCHARATE, AMPHETAMINE ASPARTATE, DEXTROAMPHETAMINE SULFATE AND AMPHETAMINE SULFATE 2.5; 2.5; 2.5; 2.5 MG/1; MG/1; MG/1; MG/1
TABLET ORAL
Qty: 60 TABLET | Refills: 0 | Status: SHIPPED | OUTPATIENT
Start: 2021-03-19 | End: 2021-10-26 | Stop reason: SDUPTHER

## 2021-03-05 RX ORDER — DEXTROAMPHETAMINE SACCHARATE, AMPHETAMINE ASPARTATE, DEXTROAMPHETAMINE SULFATE AND AMPHETAMINE SULFATE 2.5; 2.5; 2.5; 2.5 MG/1; MG/1; MG/1; MG/1
TABLET ORAL
Qty: 60 TABLET | Refills: 0 | Status: SHIPPED | OUTPATIENT
Start: 2021-04-17 | End: 2022-11-15 | Stop reason: SDUPTHER

## 2021-03-05 RX ORDER — DEXTROAMPHETAMINE SACCHARATE, AMPHETAMINE ASPARTATE, DEXTROAMPHETAMINE SULFATE AND AMPHETAMINE SULFATE 2.5; 2.5; 2.5; 2.5 MG/1; MG/1; MG/1; MG/1
TABLET ORAL
Qty: 60 TABLET | Refills: 0 | Status: SHIPPED | OUTPATIENT
Start: 2021-05-15 | End: 2022-11-15

## 2021-03-11 ENCOUNTER — TELEPHONE (OUTPATIENT)
Dept: PULMONOLOGY | Facility: CLINIC | Age: 41
End: 2021-03-11

## 2021-03-11 DIAGNOSIS — Z01.818 PREPROCEDURAL EXAMINATION: ICD-10-CM

## 2021-03-22 ENCOUNTER — LAB VISIT (OUTPATIENT)
Dept: FAMILY MEDICINE | Facility: CLINIC | Age: 41
End: 2021-03-22
Payer: COMMERCIAL

## 2021-03-22 DIAGNOSIS — Z01.818 PREPROCEDURAL EXAMINATION: ICD-10-CM

## 2021-03-22 PROCEDURE — U0005 INFEC AGEN DETEC AMPLI PROBE: HCPCS | Performed by: NURSE PRACTITIONER

## 2021-03-22 PROCEDURE — U0003 INFECTIOUS AGENT DETECTION BY NUCLEIC ACID (DNA OR RNA); SEVERE ACUTE RESPIRATORY SYNDROME CORONAVIRUS 2 (SARS-COV-2) (CORONAVIRUS DISEASE [COVID-19]), AMPLIFIED PROBE TECHNIQUE, MAKING USE OF HIGH THROUGHPUT TECHNOLOGIES AS DESCRIBED BY CMS-2020-01-R: HCPCS | Performed by: NURSE PRACTITIONER

## 2021-03-23 LAB — SARS-COV-2 RNA RESP QL NAA+PROBE: NOT DETECTED

## 2021-03-24 ENCOUNTER — TELEPHONE (OUTPATIENT)
Dept: SLEEP MEDICINE | Facility: HOSPITAL | Age: 41
End: 2021-03-24

## 2021-03-25 ENCOUNTER — HOSPITAL ENCOUNTER (OUTPATIENT)
Dept: SLEEP MEDICINE | Facility: HOSPITAL | Age: 41
Discharge: HOME | End: 2021-03-25
Attending: NURSE PRACTITIONER
Payer: COMMERCIAL

## 2021-03-25 DIAGNOSIS — G47.33 OSA (OBSTRUCTIVE SLEEP APNEA): ICD-10-CM

## 2021-03-25 PROCEDURE — 95811 PR POLYSOMNOGRAPHY W/CPAP: ICD-10-PCS | Mod: 26,,, | Performed by: INTERNAL MEDICINE

## 2021-03-25 PROCEDURE — 95811 POLYSOM 6/>YRS CPAP 4/> PARM: CPT | Mod: 26,,, | Performed by: INTERNAL MEDICINE

## 2021-03-25 PROCEDURE — 95811 POLYSOM 6/>YRS CPAP 4/> PARM: CPT

## 2021-04-01 ENCOUNTER — PATIENT MESSAGE (OUTPATIENT)
Dept: PULMONOLOGY | Facility: CLINIC | Age: 41
End: 2021-04-01

## 2021-04-01 DIAGNOSIS — G47.33 OSA (OBSTRUCTIVE SLEEP APNEA): Primary | ICD-10-CM

## 2021-04-16 ENCOUNTER — PATIENT MESSAGE (OUTPATIENT)
Dept: RESEARCH | Facility: HOSPITAL | Age: 41
End: 2021-04-16

## 2021-04-29 ENCOUNTER — TELEPHONE (OUTPATIENT)
Dept: PULMONOLOGY | Facility: CLINIC | Age: 41
End: 2021-04-29

## 2021-05-27 DIAGNOSIS — Z87.74 HISTORY OF CONGENITAL HEART DEFECT: ICD-10-CM

## 2021-05-27 DIAGNOSIS — I70.8 LEFT SUBCLAVIAN ARTERY OCCLUSION: ICD-10-CM

## 2021-05-27 RX ORDER — LISINOPRIL 10 MG/1
10 TABLET ORAL 2 TIMES DAILY
Qty: 180 TABLET | Refills: 0 | Status: CANCELLED | OUTPATIENT
Start: 2021-05-27

## 2021-06-15 RX ORDER — DEXTROAMPHETAMINE SACCHARATE, AMPHETAMINE ASPARTATE, DEXTROAMPHETAMINE SULFATE AND AMPHETAMINE SULFATE 2.5; 2.5; 2.5; 2.5 MG/1; MG/1; MG/1; MG/1
TABLET ORAL
Qty: 60 TABLET | Refills: 0 | OUTPATIENT
Start: 2021-06-15

## 2021-06-21 ENCOUNTER — OFFICE VISIT (OUTPATIENT)
Dept: FAMILY MEDICINE | Facility: CLINIC | Age: 41
End: 2021-06-21
Payer: COMMERCIAL

## 2021-06-21 VITALS
BODY MASS INDEX: 40.93 KG/M2 | SYSTOLIC BLOOD PRESSURE: 110 MMHG | OXYGEN SATURATION: 97 % | DIASTOLIC BLOOD PRESSURE: 80 MMHG | WEIGHT: 270.06 LBS | HEART RATE: 80 BPM | TEMPERATURE: 98 F | HEIGHT: 68 IN

## 2021-06-21 DIAGNOSIS — G47.33 OSA (OBSTRUCTIVE SLEEP APNEA): ICD-10-CM

## 2021-06-21 DIAGNOSIS — F98.8 ATTENTION DEFICIT DISORDER, UNSPECIFIED HYPERACTIVITY PRESENCE: Primary | ICD-10-CM

## 2021-06-21 DIAGNOSIS — K21.01 GASTROESOPHAGEAL REFLUX DISEASE WITH ESOPHAGITIS AND HEMORRHAGE: ICD-10-CM

## 2021-06-21 DIAGNOSIS — F41.9 ANXIETY: ICD-10-CM

## 2021-06-21 PROCEDURE — 99214 PR OFFICE/OUTPT VISIT, EST, LEVL IV, 30-39 MIN: ICD-10-PCS | Mod: S$GLB,,, | Performed by: FAMILY MEDICINE

## 2021-06-21 PROCEDURE — 99999 PR PBB SHADOW E&M-EST. PATIENT-LVL III: ICD-10-PCS | Mod: PBBFAC,,, | Performed by: FAMILY MEDICINE

## 2021-06-21 PROCEDURE — 3008F PR BODY MASS INDEX (BMI) DOCUMENTED: ICD-10-PCS | Mod: CPTII,S$GLB,, | Performed by: FAMILY MEDICINE

## 2021-06-21 PROCEDURE — 3008F BODY MASS INDEX DOCD: CPT | Mod: CPTII,S$GLB,, | Performed by: FAMILY MEDICINE

## 2021-06-21 PROCEDURE — 1126F PR PAIN SEVERITY QUANTIFIED, NO PAIN PRESENT: ICD-10-PCS | Mod: S$GLB,,, | Performed by: FAMILY MEDICINE

## 2021-06-21 PROCEDURE — 99214 OFFICE O/P EST MOD 30 MIN: CPT | Mod: S$GLB,,, | Performed by: FAMILY MEDICINE

## 2021-06-21 PROCEDURE — 1126F AMNT PAIN NOTED NONE PRSNT: CPT | Mod: S$GLB,,, | Performed by: FAMILY MEDICINE

## 2021-06-21 PROCEDURE — 99999 PR PBB SHADOW E&M-EST. PATIENT-LVL III: CPT | Mod: PBBFAC,,, | Performed by: FAMILY MEDICINE

## 2021-06-21 RX ORDER — DEXTROAMPHETAMINE SACCHARATE, AMPHETAMINE ASPARTATE, DEXTROAMPHETAMINE SULFATE AND AMPHETAMINE SULFATE 2.5; 2.5; 2.5; 2.5 MG/1; MG/1; MG/1; MG/1
1 TABLET ORAL 2 TIMES DAILY
Qty: 60 TABLET | Refills: 0 | Status: SHIPPED | OUTPATIENT
Start: 2021-06-21 | End: 2021-10-26 | Stop reason: SDUPTHER

## 2021-06-21 RX ORDER — DIAZEPAM 10 MG/1
10 TABLET ORAL 2 TIMES DAILY PRN
Qty: 60 TABLET | Refills: 2 | Status: SHIPPED | OUTPATIENT
Start: 2021-06-21 | End: 2022-02-09

## 2021-06-21 RX ORDER — PANTOPRAZOLE SODIUM 40 MG/1
40 TABLET, DELAYED RELEASE ORAL DAILY
Qty: 30 TABLET | Refills: 0 | Status: SHIPPED | OUTPATIENT
Start: 2021-06-21 | End: 2021-07-24

## 2021-06-21 RX ORDER — FLUTICASONE PROPIONATE 50 MCG
SPRAY, SUSPENSION (ML) NASAL
COMMUNITY
Start: 2021-05-27

## 2021-06-21 RX ORDER — DEXTROAMPHETAMINE SACCHARATE, AMPHETAMINE ASPARTATE, DEXTROAMPHETAMINE SULFATE AND AMPHETAMINE SULFATE 2.5; 2.5; 2.5; 2.5 MG/1; MG/1; MG/1; MG/1
1 TABLET ORAL 2 TIMES DAILY
Qty: 60 TABLET | Refills: 0 | Status: SHIPPED | OUTPATIENT
Start: 2021-07-19 | End: 2021-08-15

## 2021-06-21 RX ORDER — DEXTROAMPHETAMINE SACCHARATE, AMPHETAMINE ASPARTATE, DEXTROAMPHETAMINE SULFATE AND AMPHETAMINE SULFATE 2.5; 2.5; 2.5; 2.5 MG/1; MG/1; MG/1; MG/1
1 TABLET ORAL 2 TIMES DAILY
Qty: 60 TABLET | Refills: 0 | Status: SHIPPED | OUTPATIENT
Start: 2021-08-17 | End: 2021-09-13

## 2021-06-23 ENCOUNTER — TELEPHONE (OUTPATIENT)
Dept: PULMONOLOGY | Facility: CLINIC | Age: 41
End: 2021-06-23

## 2021-06-28 ENCOUNTER — OFFICE VISIT (OUTPATIENT)
Dept: PULMONOLOGY | Facility: CLINIC | Age: 41
End: 2021-06-28
Payer: COMMERCIAL

## 2021-06-28 VITALS
WEIGHT: 268.06 LBS | BODY MASS INDEX: 40.63 KG/M2 | SYSTOLIC BLOOD PRESSURE: 126 MMHG | HEIGHT: 68 IN | HEART RATE: 64 BPM | OXYGEN SATURATION: 97 % | DIASTOLIC BLOOD PRESSURE: 83 MMHG

## 2021-06-28 DIAGNOSIS — G47.33 OSA (OBSTRUCTIVE SLEEP APNEA): Primary | ICD-10-CM

## 2021-06-28 PROCEDURE — 3008F BODY MASS INDEX DOCD: CPT | Mod: CPTII,S$GLB,, | Performed by: NURSE PRACTITIONER

## 2021-06-28 PROCEDURE — 99999 PR PBB SHADOW E&M-EST. PATIENT-LVL IV: ICD-10-PCS | Mod: PBBFAC,,, | Performed by: NURSE PRACTITIONER

## 2021-06-28 PROCEDURE — 1126F AMNT PAIN NOTED NONE PRSNT: CPT | Mod: S$GLB,,, | Performed by: NURSE PRACTITIONER

## 2021-06-28 PROCEDURE — 3008F PR BODY MASS INDEX (BMI) DOCUMENTED: ICD-10-PCS | Mod: CPTII,S$GLB,, | Performed by: NURSE PRACTITIONER

## 2021-06-28 PROCEDURE — 1126F PR PAIN SEVERITY QUANTIFIED, NO PAIN PRESENT: ICD-10-PCS | Mod: S$GLB,,, | Performed by: NURSE PRACTITIONER

## 2021-06-28 PROCEDURE — 99999 PR PBB SHADOW E&M-EST. PATIENT-LVL IV: CPT | Mod: PBBFAC,,, | Performed by: NURSE PRACTITIONER

## 2021-06-28 PROCEDURE — 99213 OFFICE O/P EST LOW 20 MIN: CPT | Mod: S$GLB,,, | Performed by: NURSE PRACTITIONER

## 2021-06-28 PROCEDURE — 99213 PR OFFICE/OUTPT VISIT, EST, LEVL III, 20-29 MIN: ICD-10-PCS | Mod: S$GLB,,, | Performed by: NURSE PRACTITIONER

## 2021-07-05 PROBLEM — G47.30 SLEEP-RELATED BREATHING DISORDER: Status: RESOLVED | Noted: 2020-12-20 | Resolved: 2021-07-05

## 2021-08-27 DIAGNOSIS — Z87.74 HISTORY OF CONGENITAL HEART DEFECT: ICD-10-CM

## 2021-08-27 DIAGNOSIS — I70.8 LEFT SUBCLAVIAN ARTERY OCCLUSION: ICD-10-CM

## 2021-08-27 RX ORDER — LISINOPRIL 10 MG/1
10 TABLET ORAL 2 TIMES DAILY
Qty: 180 TABLET | Refills: 0 | Status: SHIPPED | OUTPATIENT
Start: 2021-08-27 | End: 2021-12-09 | Stop reason: SDUPTHER

## 2021-10-26 ENCOUNTER — OFFICE VISIT (OUTPATIENT)
Dept: FAMILY MEDICINE | Facility: CLINIC | Age: 41
End: 2021-10-26
Payer: COMMERCIAL

## 2021-10-26 VITALS
WEIGHT: 273.81 LBS | TEMPERATURE: 98 F | OXYGEN SATURATION: 96 % | BODY MASS INDEX: 41.5 KG/M2 | HEIGHT: 68 IN | SYSTOLIC BLOOD PRESSURE: 116 MMHG | DIASTOLIC BLOOD PRESSURE: 80 MMHG | RESPIRATION RATE: 16 BRPM | HEART RATE: 85 BPM

## 2021-10-26 DIAGNOSIS — I42.9 CARDIOMYOPATHY, UNSPECIFIED TYPE: ICD-10-CM

## 2021-10-26 DIAGNOSIS — E66.01 SEVERE OBESITY (BMI 35.0-39.9) WITH COMORBIDITY: ICD-10-CM

## 2021-10-26 DIAGNOSIS — I70.8 LEFT SUBCLAVIAN ARTERY OCCLUSION: ICD-10-CM

## 2021-10-26 DIAGNOSIS — F98.8 ATTENTION DEFICIT DISORDER, UNSPECIFIED HYPERACTIVITY PRESENCE: ICD-10-CM

## 2021-10-26 DIAGNOSIS — Z00.00 ANNUAL PHYSICAL EXAM: Primary | ICD-10-CM

## 2021-10-26 PROCEDURE — 3074F PR MOST RECENT SYSTOLIC BLOOD PRESSURE < 130 MM HG: ICD-10-PCS | Mod: CPTII,S$GLB,, | Performed by: FAMILY MEDICINE

## 2021-10-26 PROCEDURE — 99999 PR PBB SHADOW E&M-EST. PATIENT-LVL IV: ICD-10-PCS | Mod: PBBFAC,,, | Performed by: FAMILY MEDICINE

## 2021-10-26 PROCEDURE — 3008F PR BODY MASS INDEX (BMI) DOCUMENTED: ICD-10-PCS | Mod: CPTII,S$GLB,, | Performed by: FAMILY MEDICINE

## 2021-10-26 PROCEDURE — 99396 PREV VISIT EST AGE 40-64: CPT | Mod: S$GLB,,, | Performed by: FAMILY MEDICINE

## 2021-10-26 PROCEDURE — 99396 PR PREVENTIVE VISIT,EST,40-64: ICD-10-PCS | Mod: S$GLB,,, | Performed by: FAMILY MEDICINE

## 2021-10-26 PROCEDURE — 4010F PR ACE/ARB THEARPY RXD/TAKEN: ICD-10-PCS | Mod: CPTII,S$GLB,, | Performed by: FAMILY MEDICINE

## 2021-10-26 PROCEDURE — 3079F PR MOST RECENT DIASTOLIC BLOOD PRESSURE 80-89 MM HG: ICD-10-PCS | Mod: CPTII,S$GLB,, | Performed by: FAMILY MEDICINE

## 2021-10-26 PROCEDURE — 3074F SYST BP LT 130 MM HG: CPT | Mod: CPTII,S$GLB,, | Performed by: FAMILY MEDICINE

## 2021-10-26 PROCEDURE — 99999 PR PBB SHADOW E&M-EST. PATIENT-LVL IV: CPT | Mod: PBBFAC,,, | Performed by: FAMILY MEDICINE

## 2021-10-26 PROCEDURE — 3008F BODY MASS INDEX DOCD: CPT | Mod: CPTII,S$GLB,, | Performed by: FAMILY MEDICINE

## 2021-10-26 PROCEDURE — 1160F RVW MEDS BY RX/DR IN RCRD: CPT | Mod: CPTII,S$GLB,, | Performed by: FAMILY MEDICINE

## 2021-10-26 PROCEDURE — 1159F MED LIST DOCD IN RCRD: CPT | Mod: CPTII,S$GLB,, | Performed by: FAMILY MEDICINE

## 2021-10-26 PROCEDURE — 1160F PR REVIEW ALL MEDS BY PRESCRIBER/CLIN PHARMACIST DOCUMENTED: ICD-10-PCS | Mod: CPTII,S$GLB,, | Performed by: FAMILY MEDICINE

## 2021-10-26 PROCEDURE — 3079F DIAST BP 80-89 MM HG: CPT | Mod: CPTII,S$GLB,, | Performed by: FAMILY MEDICINE

## 2021-10-26 PROCEDURE — 1159F PR MEDICATION LIST DOCUMENTED IN MEDICAL RECORD: ICD-10-PCS | Mod: CPTII,S$GLB,, | Performed by: FAMILY MEDICINE

## 2021-10-26 PROCEDURE — 4010F ACE/ARB THERAPY RXD/TAKEN: CPT | Mod: CPTII,S$GLB,, | Performed by: FAMILY MEDICINE

## 2021-10-26 RX ORDER — DEXTROAMPHETAMINE SACCHARATE, AMPHETAMINE ASPARTATE, DEXTROAMPHETAMINE SULFATE AND AMPHETAMINE SULFATE 2.5; 2.5; 2.5; 2.5 MG/1; MG/1; MG/1; MG/1
1 TABLET ORAL 2 TIMES DAILY
Qty: 60 TABLET | Refills: 0 | Status: SHIPPED | OUTPATIENT
Start: 2021-10-26 | End: 2022-04-07 | Stop reason: SDUPTHER

## 2021-10-26 RX ORDER — CARBOXYMETHYLCELLULOSE/CITRIC 0.75 G
1 CAPSULE ORAL 2 TIMES DAILY
Qty: 180 CAPSULE | Refills: 3 | Status: SHIPPED | OUTPATIENT
Start: 2021-10-26 | End: 2022-05-22 | Stop reason: SDUPTHER

## 2021-10-26 RX ORDER — ATORVASTATIN CALCIUM 10 MG/1
10 TABLET, FILM COATED ORAL DAILY
Qty: 90 TABLET | Refills: 3 | Status: SHIPPED | OUTPATIENT
Start: 2021-10-26 | End: 2023-02-08 | Stop reason: SDUPTHER

## 2021-10-28 RX ORDER — CARBOXYMETHYLCELLULOSE/CITRIC 0.75 G
3 CAPSULE ORAL 2 TIMES DAILY WITH MEALS
Qty: 168 CAPSULE | Refills: 5 | Status: SHIPPED | OUTPATIENT
Start: 2021-10-28 | End: 2022-04-07 | Stop reason: ALTCHOICE

## 2021-10-29 RX ORDER — CARBOXYMETHYLCELLULOSE/CITRIC 0.75 G
1 CAPSULE ORAL 2 TIMES DAILY
Qty: 180 CAPSULE | Refills: 3 | Status: CANCELLED | OUTPATIENT
Start: 2021-10-29

## 2021-11-03 ENCOUNTER — LAB VISIT (OUTPATIENT)
Dept: LAB | Facility: HOSPITAL | Age: 41
End: 2021-11-03
Attending: FAMILY MEDICINE
Payer: COMMERCIAL

## 2021-11-03 ENCOUNTER — PATIENT MESSAGE (OUTPATIENT)
Dept: FAMILY MEDICINE | Facility: CLINIC | Age: 41
End: 2021-11-03
Payer: COMMERCIAL

## 2021-11-03 DIAGNOSIS — Z00.00 ANNUAL PHYSICAL EXAM: ICD-10-CM

## 2021-11-03 LAB
ALBUMIN SERPL BCP-MCNC: 3.8 G/DL (ref 3.5–5.2)
ALP SERPL-CCNC: 79 U/L (ref 55–135)
ALT SERPL W/O P-5'-P-CCNC: 25 U/L (ref 10–44)
ANION GAP SERPL CALC-SCNC: 10 MMOL/L (ref 8–16)
AST SERPL-CCNC: 17 U/L (ref 10–40)
BASOPHILS # BLD AUTO: 0.05 K/UL (ref 0–0.2)
BASOPHILS NFR BLD: 0.8 % (ref 0–1.9)
BILIRUB SERPL-MCNC: 0.3 MG/DL (ref 0.1–1)
BUN SERPL-MCNC: 10 MG/DL (ref 6–20)
CALCIUM SERPL-MCNC: 9.1 MG/DL (ref 8.7–10.5)
CHLORIDE SERPL-SCNC: 107 MMOL/L (ref 95–110)
CHOLEST SERPL-MCNC: 123 MG/DL (ref 120–199)
CHOLEST/HDLC SERPL: 2.9 {RATIO} (ref 2–5)
CO2 SERPL-SCNC: 24 MMOL/L (ref 23–29)
CREAT SERPL-MCNC: 0.9 MG/DL (ref 0.5–1.4)
DIFFERENTIAL METHOD: ABNORMAL
EOSINOPHIL # BLD AUTO: 0.2 K/UL (ref 0–0.5)
EOSINOPHIL NFR BLD: 3.2 % (ref 0–8)
ERYTHROCYTE [DISTWIDTH] IN BLOOD BY AUTOMATED COUNT: 14 % (ref 11.5–14.5)
EST. GFR  (AFRICAN AMERICAN): >60 ML/MIN/1.73 M^2
EST. GFR  (NON AFRICAN AMERICAN): >60 ML/MIN/1.73 M^2
ESTIMATED AVG GLUCOSE: 120 MG/DL (ref 68–131)
GLUCOSE SERPL-MCNC: 101 MG/DL (ref 70–110)
HBA1C MFR BLD: 5.8 % (ref 4–5.6)
HCT VFR BLD AUTO: 42.3 % (ref 40–54)
HDLC SERPL-MCNC: 42 MG/DL (ref 40–75)
HDLC SERPL: 34.1 % (ref 20–50)
HGB BLD-MCNC: 13.7 G/DL (ref 14–18)
IMM GRANULOCYTES # BLD AUTO: 0.02 K/UL (ref 0–0.04)
IMM GRANULOCYTES NFR BLD AUTO: 0.3 % (ref 0–0.5)
LDLC SERPL CALC-MCNC: 61.4 MG/DL (ref 63–159)
LYMPHOCYTES # BLD AUTO: 1.8 K/UL (ref 1–4.8)
LYMPHOCYTES NFR BLD: 29.2 % (ref 18–48)
MCH RBC QN AUTO: 28.2 PG (ref 27–31)
MCHC RBC AUTO-ENTMCNC: 32.4 G/DL (ref 32–36)
MCV RBC AUTO: 87 FL (ref 82–98)
MONOCYTES # BLD AUTO: 0.6 K/UL (ref 0.3–1)
MONOCYTES NFR BLD: 9.2 % (ref 4–15)
NEUTROPHILS # BLD AUTO: 3.6 K/UL (ref 1.8–7.7)
NEUTROPHILS NFR BLD: 57.3 % (ref 38–73)
NONHDLC SERPL-MCNC: 81 MG/DL
NRBC BLD-RTO: 0 /100 WBC
PLATELET # BLD AUTO: 238 K/UL (ref 150–450)
PMV BLD AUTO: 9.9 FL (ref 9.2–12.9)
POTASSIUM SERPL-SCNC: 3.9 MMOL/L (ref 3.5–5.1)
PROT SERPL-MCNC: 7.1 G/DL (ref 6–8.4)
RBC # BLD AUTO: 4.85 M/UL (ref 4.6–6.2)
SODIUM SERPL-SCNC: 141 MMOL/L (ref 136–145)
TRIGL SERPL-MCNC: 98 MG/DL (ref 30–150)
TSH SERPL DL<=0.005 MIU/L-ACNC: 3.66 UIU/ML (ref 0.4–4)
URATE SERPL-MCNC: 7.1 MG/DL (ref 3.4–7)
WBC # BLD AUTO: 6.3 K/UL (ref 3.9–12.7)

## 2021-11-03 PROCEDURE — 84550 ASSAY OF BLOOD/URIC ACID: CPT | Performed by: FAMILY MEDICINE

## 2021-11-03 PROCEDURE — 80053 COMPREHEN METABOLIC PANEL: CPT | Performed by: FAMILY MEDICINE

## 2021-11-03 PROCEDURE — 86803 HEPATITIS C AB TEST: CPT | Performed by: FAMILY MEDICINE

## 2021-11-03 PROCEDURE — 36415 COLL VENOUS BLD VENIPUNCTURE: CPT | Mod: PO | Performed by: FAMILY MEDICINE

## 2021-11-03 PROCEDURE — 83036 HEMOGLOBIN GLYCOSYLATED A1C: CPT | Performed by: FAMILY MEDICINE

## 2021-11-03 PROCEDURE — 86592 SYPHILIS TEST NON-TREP QUAL: CPT | Performed by: FAMILY MEDICINE

## 2021-11-03 PROCEDURE — 84443 ASSAY THYROID STIM HORMONE: CPT | Performed by: FAMILY MEDICINE

## 2021-11-03 PROCEDURE — 85025 COMPLETE CBC W/AUTO DIFF WBC: CPT | Performed by: FAMILY MEDICINE

## 2021-11-03 PROCEDURE — 84403 ASSAY OF TOTAL TESTOSTERONE: CPT | Performed by: FAMILY MEDICINE

## 2021-11-03 PROCEDURE — 80061 LIPID PANEL: CPT | Performed by: FAMILY MEDICINE

## 2021-11-04 LAB
HCV AB SERPL QL IA: NEGATIVE
RPR SER QL: NORMAL

## 2021-11-08 ENCOUNTER — PATIENT MESSAGE (OUTPATIENT)
Dept: FAMILY MEDICINE | Facility: CLINIC | Age: 41
End: 2021-11-08
Payer: COMMERCIAL

## 2021-11-08 LAB
ALBUMIN SERPL-MCNC: 4.2 G/DL (ref 3.6–5.1)
SHBG SERPL-SCNC: 15 NMOL/L (ref 10–50)
TESTOST FREE SERPL-MCNC: 98.8 PG/ML (ref 46–224)
TESTOST SERPL-MCNC: 411 NG/DL (ref 250–1100)
TESTOSTERONE.FREE+WB SERPL-MCNC: 190.3 NG/DL (ref 110–575)

## 2021-12-09 DIAGNOSIS — Z87.74 HISTORY OF CONGENITAL HEART DEFECT: ICD-10-CM

## 2021-12-09 DIAGNOSIS — I70.8 LEFT SUBCLAVIAN ARTERY OCCLUSION: ICD-10-CM

## 2021-12-11 RX ORDER — LISINOPRIL 10 MG/1
10 TABLET ORAL 2 TIMES DAILY
Qty: 180 TABLET | Refills: 3 | Status: SHIPPED | OUTPATIENT
Start: 2021-12-11 | End: 2022-12-27

## 2022-02-08 DIAGNOSIS — F41.9 ANXIETY: ICD-10-CM

## 2022-02-08 NOTE — TELEPHONE ENCOUNTER
No new care gaps identified.  Powered by [employer]. Reference number: [id number].   2/08/2022 5:40:41 PM CST

## 2022-02-09 RX ORDER — DIAZEPAM 10 MG/1
TABLET ORAL
Qty: 60 TABLET | Refills: 2 | Status: SHIPPED | OUTPATIENT
Start: 2022-02-09 | End: 2022-07-22

## 2022-02-09 NOTE — TELEPHONE ENCOUNTER
Last Office Visit Info:   The patient's last visit with Gómez Parisi MD was on 10/26/2021.    The patient's last visit in current department was on 10/26/2021.        Last CBC Results:   Lab Results   Component Value Date    WBC 6.30 11/03/2021    HGB 13.7 (L) 11/03/2021    HCT 42.3 11/03/2021     11/03/2021       Last CMP Results  Lab Results   Component Value Date     11/03/2021    K 3.9 11/03/2021     11/03/2021    CO2 24 11/03/2021    BUN 10 11/03/2021    CREATININE 0.9 11/03/2021    CALCIUM 9.1 11/03/2021    ALBUMIN 3.8 11/03/2021    ALBUMIN 4.2 11/03/2021    AST 17 11/03/2021    ALT 25 11/03/2021       Last Lipids  Lab Results   Component Value Date    CHOL 123 11/03/2021    TRIG 98 11/03/2021    HDL 42 11/03/2021    LDLCALC 61.4 (L) 11/03/2021       Last A1C  Lab Results   Component Value Date    HGBA1C 5.8 (H) 11/03/2021       Last TSH  Lab Results   Component Value Date    TSH 3.658 11/03/2021             Current Med Refills  Medication List with Changes/Refills   Current Medications    ALBUTEROL (PROVENTIL/VENTOLIN HFA) 90 MCG/ACTUATION INHALER    Inhale 2 puffs into the lungs every 6 (six) hours as needed for Wheezing.       Start Date: 1/5/2021  End Date: --    ASPIRIN (ECOTRIN) 81 MG EC TABLET    Take 81 mg by mouth once daily.       Start Date: --        End Date: --    ATORVASTATIN (LIPITOR) 10 MG TABLET    Take 1 tablet (10 mg total) by mouth once daily.       Start Date: 10/26/2021End Date: 10/26/2022    CARBOXYMETHYLCELLULOSE-CITRIC (PLENITY) 0.75 GRAM CAP    Take 1 capsule by mouth 2 (two) times a day. With lunch and dinner       Start Date: 10/26/2021End Date: --    CARBOXYMETHYLCELLULOSE-CITRIC (PLENITY) 0.75 GRAM CAP    Take 3 capsules by mouth 2 (two) times daily with meals.       Start Date: 10/28/2021End Date: --    CARVEDILOL (COREG) 12.5 MG TABLET    TAKE ONE TABLET BY MOUTH TWICE DAILY WITH MEALS FOR BLOOD PRESSURE       Start Date: 12/2/2021 End Date: --     DESLORATADINE (CLARINEX) 5 MG TABLET    Take 1 tablet (5 mg total) by mouth once daily.       Start Date: 2020End Date: 2021    DEXTROAMPHETAMINE-AMPHETAMINE 10 MG TAB    TAKE ONE TABLET BY MOUTH TWICE DAILY (fill on 2019)       Start Date: 2021 End Date: --    DEXTROAMPHETAMINE-AMPHETAMINE 10 MG TAB    TAKE ONE TABLET BY MOUTH TWICE DAILY (fill on 2019)       Start Date: 5/15/2021 End Date: --    DEXTROAMPHETAMINE-AMPHETAMINE 10 MG TAB    Take 1 tablet (10 mg total) by mouth 2 (two) times daily.       Start Date: 10/26/2021End Date: --    DIAZEPAM (VALIUM) 10 MG TAB    Take 1 tablet (10 mg total) by mouth 2 (two) times daily as needed (anxiety).       Start Date: 2021 End Date: --    FLUTICASONE PROPIONATE (FLONASE) 50 MCG/ACTUATION NASAL SPRAY    SMARTSI Spray(s) Both Nares Every Evening       Start Date: 2021 End Date: --    LISINOPRIL 10 MG TABLET    TAKE ONE TABLET BY MOUTH TWICE DAILY FOR BLOOD PRESSURE       Start Date:  Date: --    LISINOPRIL 10 MG TABLET    Take 1 tablet (10 mg total) by mouth 2 (two) times daily.       Start Date:  Date: --    VIIBRYD 20 MG TAB    TAKE ONE TABLET BY MOUTH ONCE DAILY       Start Date: 10/14/2021End Date: --

## 2022-04-07 ENCOUNTER — OFFICE VISIT (OUTPATIENT)
Dept: FAMILY MEDICINE | Facility: CLINIC | Age: 42
End: 2022-04-07
Payer: COMMERCIAL

## 2022-04-07 VITALS
DIASTOLIC BLOOD PRESSURE: 60 MMHG | BODY MASS INDEX: 41.19 KG/M2 | HEART RATE: 90 BPM | HEIGHT: 68 IN | WEIGHT: 271.81 LBS | TEMPERATURE: 98 F | SYSTOLIC BLOOD PRESSURE: 80 MMHG | OXYGEN SATURATION: 98 %

## 2022-04-07 DIAGNOSIS — F98.8 ATTENTION DEFICIT DISORDER, UNSPECIFIED HYPERACTIVITY PRESENCE: ICD-10-CM

## 2022-04-07 DIAGNOSIS — J06.9 UPPER RESPIRATORY TRACT INFECTION, UNSPECIFIED TYPE: ICD-10-CM

## 2022-04-07 DIAGNOSIS — J30.9 ALLERGIC RHINITIS, UNSPECIFIED SEASONALITY, UNSPECIFIED TRIGGER: ICD-10-CM

## 2022-04-07 DIAGNOSIS — I42.9 CARDIOMYOPATHY, UNSPECIFIED TYPE: ICD-10-CM

## 2022-04-07 DIAGNOSIS — E66.01 SEVERE OBESITY (BMI 35.0-39.9) WITH COMORBIDITY: ICD-10-CM

## 2022-04-07 PROCEDURE — 3078F PR MOST RECENT DIASTOLIC BLOOD PRESSURE < 80 MM HG: ICD-10-PCS | Mod: CPTII,S$GLB,, | Performed by: FAMILY MEDICINE

## 2022-04-07 PROCEDURE — 3008F BODY MASS INDEX DOCD: CPT | Mod: CPTII,S$GLB,, | Performed by: FAMILY MEDICINE

## 2022-04-07 PROCEDURE — 1160F RVW MEDS BY RX/DR IN RCRD: CPT | Mod: CPTII,S$GLB,, | Performed by: FAMILY MEDICINE

## 2022-04-07 PROCEDURE — 1159F MED LIST DOCD IN RCRD: CPT | Mod: CPTII,S$GLB,, | Performed by: FAMILY MEDICINE

## 2022-04-07 PROCEDURE — 4010F PR ACE/ARB THEARPY RXD/TAKEN: ICD-10-PCS | Mod: CPTII,S$GLB,, | Performed by: FAMILY MEDICINE

## 2022-04-07 PROCEDURE — 99214 OFFICE O/P EST MOD 30 MIN: CPT | Mod: S$GLB,,, | Performed by: FAMILY MEDICINE

## 2022-04-07 PROCEDURE — 1159F PR MEDICATION LIST DOCUMENTED IN MEDICAL RECORD: ICD-10-PCS | Mod: CPTII,S$GLB,, | Performed by: FAMILY MEDICINE

## 2022-04-07 PROCEDURE — 4010F ACE/ARB THERAPY RXD/TAKEN: CPT | Mod: CPTII,S$GLB,, | Performed by: FAMILY MEDICINE

## 2022-04-07 PROCEDURE — 3074F SYST BP LT 130 MM HG: CPT | Mod: CPTII,S$GLB,, | Performed by: FAMILY MEDICINE

## 2022-04-07 PROCEDURE — 3008F PR BODY MASS INDEX (BMI) DOCUMENTED: ICD-10-PCS | Mod: CPTII,S$GLB,, | Performed by: FAMILY MEDICINE

## 2022-04-07 PROCEDURE — 3078F DIAST BP <80 MM HG: CPT | Mod: CPTII,S$GLB,, | Performed by: FAMILY MEDICINE

## 2022-04-07 PROCEDURE — 3074F PR MOST RECENT SYSTOLIC BLOOD PRESSURE < 130 MM HG: ICD-10-PCS | Mod: CPTII,S$GLB,, | Performed by: FAMILY MEDICINE

## 2022-04-07 PROCEDURE — 99999 PR PBB SHADOW E&M-EST. PATIENT-LVL IV: CPT | Mod: PBBFAC,,, | Performed by: FAMILY MEDICINE

## 2022-04-07 PROCEDURE — 1160F PR REVIEW ALL MEDS BY PRESCRIBER/CLIN PHARMACIST DOCUMENTED: ICD-10-PCS | Mod: CPTII,S$GLB,, | Performed by: FAMILY MEDICINE

## 2022-04-07 PROCEDURE — 99999 PR PBB SHADOW E&M-EST. PATIENT-LVL IV: ICD-10-PCS | Mod: PBBFAC,,, | Performed by: FAMILY MEDICINE

## 2022-04-07 PROCEDURE — 99214 PR OFFICE/OUTPT VISIT, EST, LEVL IV, 30-39 MIN: ICD-10-PCS | Mod: S$GLB,,, | Performed by: FAMILY MEDICINE

## 2022-04-07 RX ORDER — DESLORATADINE 5 MG/1
5 TABLET ORAL DAILY
Qty: 30 TABLET | Refills: 11 | Status: SHIPPED | OUTPATIENT
Start: 2022-04-07 | End: 2023-02-08 | Stop reason: SDUPTHER

## 2022-04-07 RX ORDER — DEXTROAMPHETAMINE SACCHARATE, AMPHETAMINE ASPARTATE, DEXTROAMPHETAMINE SULFATE AND AMPHETAMINE SULFATE 2.5; 2.5; 2.5; 2.5 MG/1; MG/1; MG/1; MG/1
1 TABLET ORAL 2 TIMES DAILY
Qty: 60 TABLET | Refills: 0 | Status: SHIPPED | OUTPATIENT
Start: 2022-06-04 | End: 2022-11-15

## 2022-04-07 RX ORDER — DEXTROAMPHETAMINE SACCHARATE, AMPHETAMINE ASPARTATE, DEXTROAMPHETAMINE SULFATE AND AMPHETAMINE SULFATE 2.5; 2.5; 2.5; 2.5 MG/1; MG/1; MG/1; MG/1
1 TABLET ORAL 2 TIMES DAILY
Qty: 60 TABLET | Refills: 0 | Status: SHIPPED | OUTPATIENT
Start: 2022-04-07 | End: 2022-11-15

## 2022-04-07 RX ORDER — DEXTROAMPHETAMINE SACCHARATE, AMPHETAMINE ASPARTATE, DEXTROAMPHETAMINE SULFATE AND AMPHETAMINE SULFATE 2.5; 2.5; 2.5; 2.5 MG/1; MG/1; MG/1; MG/1
1 TABLET ORAL 2 TIMES DAILY
Qty: 60 TABLET | Refills: 0 | Status: SHIPPED | OUTPATIENT
Start: 2022-05-06 | End: 2022-11-15

## 2022-04-07 NOTE — PROGRESS NOTES
"Chief Complaint   Patient presents with    Medication Management       SUBJECTIVE:  Garth Tejeda is a 41 y.o. male here for follow up of ADHD.   Patient has ADHD and is well controleld without drug side effects and doing well overall without any unusual symptoms or history.             Currently has co-morbidities including below problem list.        Patient Active Problem List    Diagnosis Date Noted    Severe obesity (BMI 35.0-39.9) with comorbidity 10/26/2021    Cardiomyopathy, unspecified type 10/26/2021    VALENTÍN (obstructive sleep apnea) 02/19/2021     Patient with symptoms of  Daytime sleepiness ESS 8, long irritated uvula, snoring and fatigue .  PSG 2/1/2021:    The overall AHI was 7.6 with an oxygen tracey of 85.0%. The AHI in REM sleep was 24.9. The central apnea index was 0.2. The supine AHI was 7.2.   Patient is using and benefiting from cpap. Residual predicted AHI optimal. +Aerophagia.  Adjusted pressure 4-11 and turned off ramp. Trial nasal mask.       Bacterial sinusitis 01/05/2021    Body water dehydration 09/23/2019    Mild obesity 11/08/2017    Anxiety 02/22/2017     Long standing  Seen by psych, no admissions  effexor was weight gain  Ativan PRN      ADD (attention deficit disorder) 02/22/2017    Hypertension, essential 02/22/2017    Left subclavian artery occlusion 03/28/2016     Secondary to muscle impingement, has collateral flow now, just monitoring      History of congenital heart defect 12/31/2015    Diminished pulse 12/31/2015     LEFT UPPER EXTREMITY         ROS    OBJECTIVE:  BP (!) 80/60   Pulse 90   Temp 98 °F (36.7 °C) (Oral)   Ht 5' 8" (1.727 m)   Wt 123.3 kg (271 lb 13.2 oz)   SpO2 98%   BMI 41.33 kg/m²     Wt Readings from Last 3 Encounters:   04/07/22 123.3 kg (271 lb 13.2 oz)   10/26/21 124.2 kg (273 lb 13 oz)   06/28/21 121.6 kg (268 lb 1.3 oz)     BP Readings from Last 3 Encounters:   04/07/22 (!) 80/60   10/26/21 116/80   06/28/21 126/83       Patient is in " usual state of health, alert and oriented without signs of intoxication.  No evidence on exam of illegal substance use or concerning symptoms involving abuse or intoxication (specifically evidence of IVDU, unusual bruising, slurred speech, unusual explanations).               Review of old Records:  Reviewed per epic and   NARx OD score/stimulant score: reviewed  Review of old labs:    Lab Results   Component Value Date    TSH 3.658 11/03/2021     Lab Results   Component Value Date    WBC 6.30 11/03/2021    HGB 13.7 (L) 11/03/2021    HCT 42.3 11/03/2021    MCV 87 11/03/2021     11/03/2021       Chemistry        Component Value Date/Time     11/03/2021 0743    K 3.9 11/03/2021 0743     11/03/2021 0743    CO2 24 11/03/2021 0743    BUN 10 11/03/2021 0743    CREATININE 0.9 11/03/2021 0743     11/03/2021 0743        Component Value Date/Time    CALCIUM 9.1 11/03/2021 0743    ALKPHOS 79 11/03/2021 0743    AST 17 11/03/2021 0743    ALT 25 11/03/2021 0743    BILITOT 0.3 11/03/2021 0743    ESTGFRAFRICA >60 11/03/2021 0743    EGFRNONAA >60 11/03/2021 0743        Lab Results   Component Value Date    CHOL 123 11/03/2021    CHOL 159 08/15/2018    CHOL 138 11/17/2017     Lab Results   Component Value Date    HDL 42 11/03/2021    HDL 46 08/15/2018    HDL 46 11/17/2017     Lab Results   Component Value Date    LDLCALC 61.4 (L) 11/03/2021    LDLCALC 86.4 08/15/2018    LDLCALC 73.0 11/17/2017     Lab Results   Component Value Date    TRIG 98 11/03/2021    TRIG 133 08/15/2018    TRIG 95 11/17/2017     Lab Results   Component Value Date    CHOLHDL 34.1 11/03/2021    CHOLHDL 28.9 08/15/2018    CHOLHDL 33.3 11/17/2017         Review of old imaging:  Reviewed last EKG tracing if available.          ASSESSMENT:    ICD-10-CM ICD-9-CM   1. Attention deficit disorder, unspecified hyperactivity presence  F98.8 314.00   2. Severe obesity (BMI 35.0-39.9) with comorbidity  E66.01 278.01   3. Cardiomyopathy,  unspecified type  I42.9 425.4   4. Allergic rhinitis, unspecified seasonality, unspecified trigger  J30.9 477.9   5. Upper respiratory tract infection, unspecified type  J06.9 465.9       No evidence of abuse/diversion/addiction noted through history and physical, or checking the .  Risks, benefits and alternate treatments are considered and plan of care reflects that shared decision with the patient.  Went over schedule II responsibilities, including abuse, side effects, refill restrictions, necessary visits, drug testing, protection of medication.  Patient voices understanding.      PLAN:    Problem List Items Addressed This Visit     ADD (attention deficit disorder)    Relevant Medications    dextroamphetamine-amphetamine 10 mg Tab (Start on 5/6/2022)    dextroamphetamine-amphetamine 10 mg Tab    dextroamphetamine-amphetamine 10 mg Tab (Start on 6/4/2022)    Severe obesity (BMI 35.0-39.9) with comorbidity    Current Assessment & Plan     The patient is asked to make an attempt to improve diet and exercise patterns to aid in medical management of this problem.             Cardiomyopathy, unspecified type    Current Assessment & Plan     The current medical regimen is effective;  continue present plan and medications.             Relevant Orders    CBC Auto Differential    D dimer, quantitative    Sedimentation rate    C-reactive protein      Other Visit Diagnoses     Allergic rhinitis, unspecified seasonality, unspecified trigger        Relevant Medications    desloratadine (CLARINEX) 5 mg tablet    Upper respiratory tract infection, unspecified type        Relevant Medications    desloratadine (CLARINEX) 5 mg tablet            Medication List with Changes/Refills   New Medications    DEXTROAMPHETAMINE-AMPHETAMINE 10 MG TAB    Take 1 tablet (10 mg total) by mouth 2 (two) times daily.    DEXTROAMPHETAMINE-AMPHETAMINE 10 MG TAB    Take 1 tablet (10 mg total) by mouth 2 (two) times daily.   Current Medications     ALBUTEROL (PROVENTIL/VENTOLIN HFA) 90 MCG/ACTUATION INHALER    Inhale 2 puffs into the lungs every 6 (six) hours as needed for Wheezing.    ASPIRIN (ECOTRIN) 81 MG EC TABLET    Take 81 mg by mouth once daily.    ATORVASTATIN (LIPITOR) 10 MG TABLET    Take 1 tablet (10 mg total) by mouth once daily.    CARBOXYMETHYLCELLULOSE-CITRIC (PLENITY) 0.75 GRAM CAP    Take 1 capsule by mouth 2 (two) times a day. With lunch and dinner    CARVEDILOL (COREG) 12.5 MG TABLET    TAKE ONE TABLET BY MOUTH TWICE DAILY WITH MEALS FOR BLOOD PRESSURE    DEXTROAMPHETAMINE-AMPHETAMINE 10 MG TAB    TAKE ONE TABLET BY MOUTH TWICE DAILY (fill on 2019)    DEXTROAMPHETAMINE-AMPHETAMINE 10 MG TAB    TAKE ONE TABLET BY MOUTH TWICE DAILY (fill on 2019)    DIAZEPAM (VALIUM) 10 MG TAB    TAKE ONE TABLET BY MOUTH TWICE DAILY AS NEEDED FOR ANXIETY    FLUTICASONE PROPIONATE (FLONASE) 50 MCG/ACTUATION NASAL SPRAY    SMARTSI Spray(s) Both Nares Every Evening    LISINOPRIL 10 MG TABLET    Take 1 tablet (10 mg total) by mouth 2 (two) times daily.    VIIBRYD 20 MG TAB    TAKE ONE TABLET BY MOUTH ONCE DAILY   Changed and/or Refilled Medications    Modified Medication Previous Medication    DESLORATADINE (CLARINEX) 5 MG TABLET desloratadine (CLARINEX) 5 mg tablet       Take 1 tablet (5 mg total) by mouth once daily.    Take 1 tablet (5 mg total) by mouth once daily.    DEXTROAMPHETAMINE-AMPHETAMINE 10 MG TAB dextroamphetamine-amphetamine 10 mg Tab       Take 1 tablet (10 mg total) by mouth 2 (two) times daily.    Take 1 tablet (10 mg total) by mouth 2 (two) times daily.   Discontinued Medications    CARBOXYMETHYLCELLULOSE-CITRIC (PLENITY) 0.75 GRAM CAP    Take 3 capsules by mouth 2 (two) times daily with meals.    LISINOPRIL 10 MG TABLET    TAKE ONE TABLET BY MOUTH TWICE DAILY FOR BLOOD PRESSURE         Continue with current care unless changes noted below.    High risk medication review completed per guidelines to insure safest use of  medication.      We reviewed our goals of care:    Improvement in concentration and mood  Aid focus in completing tasks at work/school  Safety first priority      And discontinuation      Intolerable side effects  Evidence of abuse/misuse or diversion      No future appointments.    3 months or sooner as needed

## 2022-04-21 ENCOUNTER — LAB VISIT (OUTPATIENT)
Dept: LAB | Facility: HOSPITAL | Age: 42
End: 2022-04-21
Attending: FAMILY MEDICINE
Payer: COMMERCIAL

## 2022-04-21 DIAGNOSIS — I42.9 CARDIOMYOPATHY, UNSPECIFIED TYPE: ICD-10-CM

## 2022-04-21 LAB
BASOPHILS # BLD AUTO: 0.03 K/UL (ref 0–0.2)
BASOPHILS NFR BLD: 0.5 % (ref 0–1.9)
CRP SERPL-MCNC: 14.7 MG/L (ref 0–8.2)
D DIMER PPP IA.FEU-MCNC: 0.4 MG/L FEU
DIFFERENTIAL METHOD: NORMAL
EOSINOPHIL # BLD AUTO: 0.1 K/UL (ref 0–0.5)
EOSINOPHIL NFR BLD: 1.4 % (ref 0–8)
ERYTHROCYTE [DISTWIDTH] IN BLOOD BY AUTOMATED COUNT: 14.2 % (ref 11.5–14.5)
ERYTHROCYTE [SEDIMENTATION RATE] IN BLOOD BY WESTERGREN METHOD: 2 MM/HR (ref 0–10)
HCT VFR BLD AUTO: 44.7 % (ref 40–54)
HGB BLD-MCNC: 14.3 G/DL (ref 14–18)
IMM GRANULOCYTES # BLD AUTO: 0.02 K/UL (ref 0–0.04)
IMM GRANULOCYTES NFR BLD AUTO: 0.3 % (ref 0–0.5)
LYMPHOCYTES # BLD AUTO: 1.4 K/UL (ref 1–4.8)
LYMPHOCYTES NFR BLD: 24.1 % (ref 18–48)
MCH RBC QN AUTO: 28.2 PG (ref 27–31)
MCHC RBC AUTO-ENTMCNC: 32 G/DL (ref 32–36)
MCV RBC AUTO: 88 FL (ref 82–98)
MONOCYTES # BLD AUTO: 0.5 K/UL (ref 0.3–1)
MONOCYTES NFR BLD: 9.2 % (ref 4–15)
NEUTROPHILS # BLD AUTO: 3.8 K/UL (ref 1.8–7.7)
NEUTROPHILS NFR BLD: 64.5 % (ref 38–73)
NRBC BLD-RTO: 0 /100 WBC
PLATELET # BLD AUTO: 234 K/UL (ref 150–450)
PMV BLD AUTO: 10 FL (ref 9.2–12.9)
RBC # BLD AUTO: 5.07 M/UL (ref 4.6–6.2)
WBC # BLD AUTO: 5.86 K/UL (ref 3.9–12.7)

## 2022-04-21 PROCEDURE — 36415 COLL VENOUS BLD VENIPUNCTURE: CPT | Mod: PO | Performed by: FAMILY MEDICINE

## 2022-04-21 PROCEDURE — 86140 C-REACTIVE PROTEIN: CPT | Performed by: FAMILY MEDICINE

## 2022-04-21 PROCEDURE — 85025 COMPLETE CBC W/AUTO DIFF WBC: CPT | Performed by: FAMILY MEDICINE

## 2022-04-21 PROCEDURE — 85379 FIBRIN DEGRADATION QUANT: CPT | Performed by: FAMILY MEDICINE

## 2022-04-21 PROCEDURE — 85652 RBC SED RATE AUTOMATED: CPT | Performed by: FAMILY MEDICINE

## 2022-05-23 RX ORDER — CARBOXYMETHYLCELLULOSE/CITRIC 0.75 G
1 CAPSULE ORAL 2 TIMES DAILY
Qty: 180 CAPSULE | Refills: 3 | Status: SHIPPED | OUTPATIENT
Start: 2022-05-23 | End: 2022-11-15 | Stop reason: ALTCHOICE

## 2022-05-23 NOTE — TELEPHONE ENCOUNTER
Last Office Visit Info:   The patient's last visit with Gómez Parisi MD was on 4/7/2022.    The patient's last visit in current department was on 4/7/2022.        Last CBC Results:   Lab Results   Component Value Date    WBC 5.86 04/21/2022    HGB 14.3 04/21/2022    HCT 44.7 04/21/2022     04/21/2022       Last CMP Results  Lab Results   Component Value Date     11/03/2021    K 3.9 11/03/2021     11/03/2021    CO2 24 11/03/2021    BUN 10 11/03/2021    CREATININE 0.9 11/03/2021    CALCIUM 9.1 11/03/2021    ALBUMIN 3.8 11/03/2021    ALBUMIN 4.2 11/03/2021    AST 17 11/03/2021    ALT 25 11/03/2021       Last Lipids  Lab Results   Component Value Date    CHOL 123 11/03/2021    TRIG 98 11/03/2021    HDL 42 11/03/2021    LDLCALC 61.4 (L) 11/03/2021       Last A1C  Lab Results   Component Value Date    HGBA1C 5.8 (H) 11/03/2021       Last TSH  Lab Results   Component Value Date    TSH 3.658 11/03/2021             Current Med Refills  Medication List with Changes/Refills   Current Medications    ALBUTEROL (PROVENTIL/VENTOLIN HFA) 90 MCG/ACTUATION INHALER    Inhale 2 puffs into the lungs every 6 (six) hours as needed for Wheezing.       Start Date: 1/5/2021  End Date: --    ASPIRIN (ECOTRIN) 81 MG EC TABLET    Take 81 mg by mouth once daily.       Start Date: --        End Date: --    ATORVASTATIN (LIPITOR) 10 MG TABLET    Take 1 tablet (10 mg total) by mouth once daily.       Start Date: 10/26/2021End Date: 10/26/2022    CARBOXYMETHYLCELLULOSE-CITRIC (PLENITY) 0.75 GRAM CAP    Take 1 capsule by mouth 2 (two) times a day. With lunch and dinner       Start Date: 10/26/2021End Date: --    CARVEDILOL (COREG) 12.5 MG TABLET    TAKE ONE TABLET BY MOUTH TWICE DAILY WITH MEALS FOR BLOOD PRESSURE       Start Date: 12/2/2021 End Date: --    DESLORATADINE (CLARINEX) 5 MG TABLET    Take 1 tablet (5 mg total) by mouth once daily.       Start Date: 4/7/2022  End Date: 4/7/2023    DEXTROAMPHETAMINE-AMPHETAMINE 10 MG  TAB    TAKE ONE TABLET BY MOUTH TWICE DAILY (fill on 2019)       Start Date: 2021 End Date: --    DEXTROAMPHETAMINE-AMPHETAMINE 10 MG TAB    TAKE ONE TABLET BY MOUTH TWICE DAILY (fill on 2019)       Start Date: 5/15/2021 End Date: --    DEXTROAMPHETAMINE-AMPHETAMINE 10 MG TAB    Take 1 tablet (10 mg total) by mouth 2 (two) times daily.       Start Date: 2022  End Date: --    DEXTROAMPHETAMINE-AMPHETAMINE 10 MG TAB    Take 1 tablet (10 mg total) by mouth 2 (two) times daily.       Start Date: 2022  End Date: --    DEXTROAMPHETAMINE-AMPHETAMINE 10 MG TAB    Take 1 tablet (10 mg total) by mouth 2 (two) times daily.       Start Date: 2022  End Date: --    DIAZEPAM (VALIUM) 10 MG TAB    TAKE ONE TABLET BY MOUTH TWICE DAILY AS NEEDED FOR ANXIETY       Start Date: 2022  End Date: --    FLUTICASONE PROPIONATE (FLONASE) 50 MCG/ACTUATION NASAL SPRAY    SMARTSI Spray(s) Both Nares Every Evening       Start Date: 2021 End Date: --    LISINOPRIL 10 MG TABLET    Take 1 tablet (10 mg total) by mouth 2 (two) times daily.       Start Date:  Date: --    VIIBRYD 20 MG TAB    TAKE ONE TABLET BY MOUTH ONCE DAILY       Start Date: 10/14/2021End Date: --

## 2022-05-27 ENCOUNTER — PATIENT MESSAGE (OUTPATIENT)
Dept: FAMILY MEDICINE | Facility: CLINIC | Age: 42
End: 2022-05-27
Payer: COMMERCIAL

## 2022-08-16 ENCOUNTER — OFFICE VISIT (OUTPATIENT)
Dept: FAMILY MEDICINE | Facility: CLINIC | Age: 42
End: 2022-08-16
Payer: COMMERCIAL

## 2022-08-16 DIAGNOSIS — F98.8 ATTENTION DEFICIT DISORDER, UNSPECIFIED HYPERACTIVITY PRESENCE: Primary | ICD-10-CM

## 2022-08-16 PROCEDURE — 99214 PR OFFICE/OUTPT VISIT, EST, LEVL IV, 30-39 MIN: ICD-10-PCS | Mod: 95,,, | Performed by: FAMILY MEDICINE

## 2022-08-16 PROCEDURE — 4010F ACE/ARB THERAPY RXD/TAKEN: CPT | Mod: CPTII,95,, | Performed by: FAMILY MEDICINE

## 2022-08-16 PROCEDURE — 4010F PR ACE/ARB THEARPY RXD/TAKEN: ICD-10-PCS | Mod: CPTII,95,, | Performed by: FAMILY MEDICINE

## 2022-08-16 PROCEDURE — 99214 OFFICE O/P EST MOD 30 MIN: CPT | Mod: 95,,, | Performed by: FAMILY MEDICINE

## 2022-09-11 NOTE — PROGRESS NOTES
Chief Complaint   Patient presents with    Medication Refill       SUBJECTIVE:  Garth Tejeda is a 42 y.o. male here for follow up of ADHD.   Patient has ADHD and is well controleld without drug side effects and doing well overall without any unusual symptoms or history.      The patient location is: [place]  The chief complaint leading to consultation is: medication refill    Visit type: audiovisual    Face to Face time with patient: 16  21 minutes of total time spent on the encounter, which includes face to face time and non-face to face time preparing to see the patient (eg, review of tests), Obtaining and/or reviewing separately obtained history, Documenting clinical information in the electronic or other health record, Independently interpreting results (not separately reported) and communicating results to the patient/family/caregiver, or Care coordination (not separately reported).         Each patient to whom he or she provides medical services by telemedicine is:  (1) informed of the relationship between the physician and patient and the respective role of any other health care provider with respect to management of the patient; and (2) notified that he or she may decline to receive medical services by telemedicine and may withdraw from such care at any time.    Notes:   Answers submitted by the patient for this visit:  Review of Systems Questionnaire (Submitted on 8/16/2022)  activity change: No  unexpected weight change: No  rhinorrhea: No  trouble swallowing: No  visual disturbance: No  chest tightness: No  polyuria: No  difficulty urinating: No  joint swelling: No  arthralgias: No  confusion: No  dysphoric mood: No         Currently has co-morbidities including below problem list.        Patient Active Problem List    Diagnosis Date Noted    Severe obesity (BMI 35.0-39.9) with comorbidity 10/26/2021    Cardiomyopathy, unspecified type 10/26/2021    VALENTÍN (obstructive sleep apnea) 02/19/2021     Patient with  symptoms of  Daytime sleepiness ESS 8, long irritated uvula, snoring and fatigue .  PSG 2/1/2021:    The overall AHI was 7.6 with an oxygen tracey of 85.0%. The AHI in REM sleep was 24.9. The central apnea index was 0.2. The supine AHI was 7.2.   Patient is using and benefiting from cpap. Residual predicted AHI optimal. +Aerophagia.  Adjusted pressure 4-11 and turned off ramp. Trial nasal mask.       Bacterial sinusitis 01/05/2021    Body water dehydration 09/23/2019    Mild obesity 11/08/2017    Anxiety 02/22/2017     Long standing  Seen by psych, no admissions  effexor was weight gain  Ativan PRN      ADD (attention deficit disorder) 02/22/2017    Hypertension, essential 02/22/2017    Left subclavian artery occlusion 03/28/2016     Secondary to muscle impingement, has collateral flow now, just monitoring      History of congenital heart defect 12/31/2015    Diminished pulse 12/31/2015     LEFT UPPER EXTREMITY         Review of Systems   HENT:  Negative for hearing loss.    Eyes:  Negative for discharge.   Respiratory:  Negative for wheezing.    Cardiovascular:  Negative for chest pain and palpitations.   Gastrointestinal:  Negative for blood in stool, constipation, diarrhea and vomiting.   Genitourinary:  Negative for hematuria and urgency.   Musculoskeletal:  Negative for neck pain.   Neurological:  Negative for weakness and headaches.   Endo/Heme/Allergies:  Negative for polydipsia.     OBJECTIVE:  There were no vitals taken for this visit.    Wt Readings from Last 3 Encounters:   04/07/22 123.3 kg (271 lb 13.2 oz)   10/26/21 124.2 kg (273 lb 13 oz)   06/28/21 121.6 kg (268 lb 1.3 oz)     BP Readings from Last 3 Encounters:   04/07/22 (!) 80/60   10/26/21 116/80   06/28/21 126/83       Patient is in usual state of health, alert and oriented without signs of intoxication.  No evidence on exam of illegal substance use or concerning symptoms involving abuse or intoxication (specifically evidence of IVDU, unusual  bruising, slurred speech, unusual explanations).               Review of old Records:  Reviewed per epic and   NARx OD score/stimulant score: reviewed  Seeing about getting weight loss surgery  Review of old labs:    Lab Results   Component Value Date    TSH 3.658 11/03/2021     Lab Results   Component Value Date    WBC 5.86 04/21/2022    HGB 14.3 04/21/2022    HCT 44.7 04/21/2022    MCV 88 04/21/2022     04/21/2022       Chemistry        Component Value Date/Time     11/03/2021 0743    K 3.9 11/03/2021 0743     11/03/2021 0743    CO2 24 11/03/2021 0743    BUN 10 11/03/2021 0743    CREATININE 0.9 11/03/2021 0743     11/03/2021 0743        Component Value Date/Time    CALCIUM 9.1 11/03/2021 0743    ALKPHOS 79 11/03/2021 0743    AST 17 11/03/2021 0743    ALT 25 11/03/2021 0743    BILITOT 0.3 11/03/2021 0743    ESTGFRAFRICA >60 11/03/2021 0743    EGFRNONAA >60 11/03/2021 0743        Lab Results   Component Value Date    CHOL 123 11/03/2021    CHOL 159 08/15/2018    CHOL 138 11/17/2017     Lab Results   Component Value Date    HDL 42 11/03/2021    HDL 46 08/15/2018    HDL 46 11/17/2017     Lab Results   Component Value Date    LDLCALC 61.4 (L) 11/03/2021    LDLCALC 86.4 08/15/2018    LDLCALC 73.0 11/17/2017     Lab Results   Component Value Date    TRIG 98 11/03/2021    TRIG 133 08/15/2018    TRIG 95 11/17/2017     Lab Results   Component Value Date    CHOLHDL 34.1 11/03/2021    CHOLHDL 28.9 08/15/2018    CHOLHDL 33.3 11/17/2017         Review of old imaging:  Reviewed last EKG tracing if available.          ASSESSMENT:    ICD-10-CM ICD-9-CM   1. Attention deficit disorder, unspecified hyperactivity presence  F98.8 314.00       No evidence of abuse/diversion/addiction noted through history and physical, or checking the .  Risks, benefits and alternate treatments are considered and plan of care reflects that shared decision with the patient.  Went over schedule II responsibilities,  including abuse, side effects, refill restrictions, necessary visits, drug testing, protection of medication.  Patient voices understanding.      PLAN:    Problem List Items Addressed This Visit       ADD (attention deficit disorder) - Primary         Medication List with Changes/Refills   Current Medications    ALBUTEROL (PROVENTIL/VENTOLIN HFA) 90 MCG/ACTUATION INHALER    Inhale 2 puffs into the lungs every 6 (six) hours as needed for Wheezing.    ASPIRIN (ECOTRIN) 81 MG EC TABLET    Take 81 mg by mouth once daily.    ATORVASTATIN (LIPITOR) 10 MG TABLET    Take 1 tablet (10 mg total) by mouth once daily.    CARBOXYMETHYLCELLULOSE-CITRIC (PLENITY) 0.75 GRAM CAP    Take 1 capsule by mouth 2 (two) times a day. With lunch and dinner    CARVEDILOL (COREG) 12.5 MG TABLET    TAKE ONE TABLET BY MOUTH TWICE DAILY WITH MEALS FOR BLOOD PRESSURE    DESLORATADINE (CLARINEX) 5 MG TABLET    Take 1 tablet (5 mg total) by mouth once daily.    DEXTROAMPHETAMINE-AMPHETAMINE 10 MG TAB    TAKE ONE TABLET BY MOUTH TWICE DAILY (fill on 2019)    DEXTROAMPHETAMINE-AMPHETAMINE 10 MG TAB    TAKE ONE TABLET BY MOUTH TWICE DAILY (fill on 2019)    DEXTROAMPHETAMINE-AMPHETAMINE 10 MG TAB    Take 1 tablet (10 mg total) by mouth 2 (two) times daily.    DEXTROAMPHETAMINE-AMPHETAMINE 10 MG TAB    Take 1 tablet (10 mg total) by mouth 2 (two) times daily.    DEXTROAMPHETAMINE-AMPHETAMINE 10 MG TAB    Take 1 tablet (10 mg total) by mouth 2 (two) times daily.    DEXTROAMPHETAMINE-AMPHETAMINE 10 MG TAB    TAKE ONE TABLET BY MOUTH TWICE DAILY.    DIAZEPAM (VALIUM) 10 MG TAB    TAKE ONE TABLET BY MOUTH TWICE DAILY AS NEEDED FOR ANXIETY    FLUTICASONE PROPIONATE (FLONASE) 50 MCG/ACTUATION NASAL SPRAY    SMARTSI Spray(s) Both Nares Every Evening    LISINOPRIL 10 MG TABLET    Take 1 tablet (10 mg total) by mouth 2 (two) times daily.    VIIBRYD 20 MG TAB    TAKE ONE TABLET BY MOUTH ONCE DAILY         Continue with current care unless changes  noted below.    High risk medication review completed per guidelines to insure safest use of medication.      We reviewed our goals of care:    Improvement in concentration and mood  Aid focus in completing tasks at work/school  Safety first priority      And discontinuation      Intolerable side effects  Evidence of abuse/misuse or diversion      No future appointments.    3 months for wellness or sooner as needed

## 2022-09-15 ENCOUNTER — PATIENT MESSAGE (OUTPATIENT)
Dept: FAMILY MEDICINE | Facility: CLINIC | Age: 42
End: 2022-09-15
Payer: COMMERCIAL

## 2022-09-15 DIAGNOSIS — R73.03 PREDIABETES: ICD-10-CM

## 2022-09-15 DIAGNOSIS — E66.01 SEVERE OBESITY: Primary | ICD-10-CM

## 2022-09-15 RX ORDER — TIRZEPATIDE 2.5 MG/.5ML
2.5 INJECTION, SOLUTION SUBCUTANEOUS
Qty: 4 PEN | Refills: 11 | Status: SHIPPED | OUTPATIENT
Start: 2022-09-15 | End: 2022-11-15 | Stop reason: ALTCHOICE

## 2022-09-20 ENCOUNTER — TELEPHONE (OUTPATIENT)
Dept: PULMONOLOGY | Facility: CLINIC | Age: 42
End: 2022-09-20
Payer: COMMERCIAL

## 2022-09-20 NOTE — TELEPHONE ENCOUNTER
Spoke with patient scheduled an appointment to see provider.    RACQUEL Starks             ----- Message from Shazia Osborne sent at 9/20/2022  3:51 PM CDT -----  Regarding: self [phone]  Type: Patient Call Back    Who called: self    What is the request in detail: pt stated he is waiting for his supplies but was told that they are needing paper work from the providers office.  [facility].     [facility]    Would the patient rather a call back or a response via [facility]? Call back    Best call back number: [phone]

## 2022-10-03 ENCOUNTER — OFFICE VISIT (OUTPATIENT)
Dept: PULMONOLOGY | Facility: CLINIC | Age: 42
End: 2022-10-03
Payer: COMMERCIAL

## 2022-10-03 VITALS
WEIGHT: 279.63 LBS | DIASTOLIC BLOOD PRESSURE: 65 MMHG | OXYGEN SATURATION: 96 % | SYSTOLIC BLOOD PRESSURE: 101 MMHG | HEIGHT: 68 IN | HEART RATE: 98 BPM | BODY MASS INDEX: 42.38 KG/M2 | TEMPERATURE: 99 F

## 2022-10-03 DIAGNOSIS — G47.33 OSA ON CPAP: Primary | ICD-10-CM

## 2022-10-03 PROCEDURE — 3078F DIAST BP <80 MM HG: CPT | Mod: CPTII,S$GLB,, | Performed by: NURSE PRACTITIONER

## 2022-10-03 PROCEDURE — 1159F MED LIST DOCD IN RCRD: CPT | Mod: CPTII,S$GLB,, | Performed by: NURSE PRACTITIONER

## 2022-10-03 PROCEDURE — 3008F BODY MASS INDEX DOCD: CPT | Mod: CPTII,S$GLB,, | Performed by: NURSE PRACTITIONER

## 2022-10-03 PROCEDURE — 3078F PR MOST RECENT DIASTOLIC BLOOD PRESSURE < 80 MM HG: ICD-10-PCS | Mod: CPTII,S$GLB,, | Performed by: NURSE PRACTITIONER

## 2022-10-03 PROCEDURE — 99999 PR PBB SHADOW E&M-EST. PATIENT-LVL V: CPT | Mod: PBBFAC,,, | Performed by: NURSE PRACTITIONER

## 2022-10-03 PROCEDURE — 99999 PR PBB SHADOW E&M-EST. PATIENT-LVL V: ICD-10-PCS | Mod: PBBFAC,,, | Performed by: NURSE PRACTITIONER

## 2022-10-03 PROCEDURE — 1159F PR MEDICATION LIST DOCUMENTED IN MEDICAL RECORD: ICD-10-PCS | Mod: CPTII,S$GLB,, | Performed by: NURSE PRACTITIONER

## 2022-10-03 PROCEDURE — 99213 PR OFFICE/OUTPT VISIT, EST, LEVL III, 20-29 MIN: ICD-10-PCS | Mod: S$GLB,,, | Performed by: NURSE PRACTITIONER

## 2022-10-03 PROCEDURE — 3074F SYST BP LT 130 MM HG: CPT | Mod: CPTII,S$GLB,, | Performed by: NURSE PRACTITIONER

## 2022-10-03 PROCEDURE — 99213 OFFICE O/P EST LOW 20 MIN: CPT | Mod: S$GLB,,, | Performed by: NURSE PRACTITIONER

## 2022-10-03 PROCEDURE — 3008F PR BODY MASS INDEX (BMI) DOCUMENTED: ICD-10-PCS | Mod: CPTII,S$GLB,, | Performed by: NURSE PRACTITIONER

## 2022-10-03 PROCEDURE — 3074F PR MOST RECENT SYSTOLIC BLOOD PRESSURE < 130 MM HG: ICD-10-PCS | Mod: CPTII,S$GLB,, | Performed by: NURSE PRACTITIONER

## 2022-10-03 PROCEDURE — 4010F ACE/ARB THERAPY RXD/TAKEN: CPT | Mod: CPTII,S$GLB,, | Performed by: NURSE PRACTITIONER

## 2022-10-03 PROCEDURE — 4010F PR ACE/ARB THEARPY RXD/TAKEN: ICD-10-PCS | Mod: CPTII,S$GLB,, | Performed by: NURSE PRACTITIONER

## 2022-10-03 NOTE — PROGRESS NOTES
HISTORY OF PRESENT ILLNESS: Garth Tejeda is a 42 y.o. male nonsmoker with hx ASD s/p repair age 3 yo, CHF EF 40%, ADD, Anxiety, HTN, AR, HLD, Obesity, VALENTÍN   is here   for management of obstructive sleep apnea and CPAP equipment check.     LOV 6/28/2021: Patient with symptoms of  Daytime sleepiness ESS 8, snoring and fatigue .    SLEEP ROUTINE:  Time to bed:  2200  Sleep onset latency: 20 min      Disruptions or awakenings:   1-2 times dog or wife, without difficulty falling back to sleep  Wakeup time:      0600  Perceived sleep quality:   sometimes rest    Daytime naps:     no    PSG 2/1/2021:    The overall AHI was 7.6 with an oxygen tracey of 85.0%. The AHI in REM sleep was 24.9. The central apnea index was 0.2. The supine AHI was 7.2.   CPAP titration 3/9/2021:  Effective control of sleep disordered breathing was achieved with CPAP at 9 cm H2O    CPAP setup 4/16/2021 9 cwp     Pt had complain of aerophagia and switch to 4-15 cwp with persistent aerophagia otherwise sleeping better on cpap. +belching, bloating  in am when on cpap. Reports skip cpap one day and did not have symptoms the following day.     CPAP set up 4/16/2021  4-11 cm H2O  On interim, pt reports resolution of bloating. Thinks it may have been contributed by certan medications he was on. Sleeping well with cpap ESS 1  Usage 09/04/2022 - 10/03/2022  Usage days 29/30 days (97%)  >= 4 hours 28 days (93%)  < 4 hours 1 days (3%)  Usage hours 236 hours 34 minutes  Average usage (total days) 7 hours 53 minutes  Average usage (days used) 8 hours 9 minutes  Median usage (days used) 8 hours 25 minutes  Total used hours (value since last reset - 10/03/2022) 4,045 hours  AirSense 10 AutoSet  Serial number [id number]  Mode AutoSet  Min Pressure 4 cmH2O  Max Pressure 11 cmH2O  EPR Fulltime  EPR level 3  Response Soft  Therapy  Pressure - cmH2O Median: 5.3 95th percentile: 7.1 Maximum: 8.1  Leaks - L/min Median: 0.5 95th percentile: 7.2 Maximum: 19.6  Events  per hour AI: 0.5 HI: 0.7 AHI: 1.2  Apnea Index Central: 0.0 Obstructive: 0.5 Unknown: 0.0  RERA Index 0.0  Cheyne-Saez respiration (average duration per night) 0 minutes (0%)    PAST MEDICAL HISTORY:    Active Ambulatory Problems     Diagnosis Date Noted    History of congenital heart defect 12/31/2015    Diminished pulse 12/31/2015    Left subclavian artery occlusion 03/28/2016    Anxiety 02/22/2017    ADD (attention deficit disorder) 02/22/2017    Hypertension, essential 02/22/2017    Mild obesity 11/08/2017    Body water dehydration 09/23/2019    Bacterial sinusitis 01/05/2021    VALENTÍN on CPAP 02/19/2021    Severe obesity (BMI 35.0-39.9) with comorbidity 10/26/2021    Cardiomyopathy, unspecified type 10/26/2021     Resolved Ambulatory Problems     Diagnosis Date Noted    Compound fracture 04/04/2015    Chest pain, atypical     Sleep-related breathing disorder 12/20/2020     Past Medical History:   Diagnosis Date    Hypertension     Septal defect                 PAST SURGICAL HISTORY:    Past Surgical History:   Procedure Laterality Date    CARDIAC SURGERY      WRIST SURGERY      right         FAMILY HISTORY:                Family History   Problem Relation Age of Onset    Diabetes Mother     Hypertension Mother        SOCIAL HISTORY:          Tobacco:   Social History     Tobacco Use   Smoking Status Never   Smokeless Tobacco Never       alcohol use:    Social History     Substance and Sexual Activity   Alcohol Use Yes    Comment: socially                      ALLERGIES:  Review of patient's allergies indicates:  No Known Allergies    CURRENT MEDICATIONS:    Current Outpatient Medications   Medication Sig Dispense Refill    albuterol (PROVENTIL/VENTOLIN HFA) 90 mcg/actuation inhaler Inhale 2 puffs into the lungs every 6 (six) hours as needed for Wheezing. 18 g 11    aspirin (ECOTRIN) 81 MG EC tablet Take 81 mg by mouth once daily.      atorvastatin (LIPITOR) 10 MG tablet Take 1 tablet (10 mg total) by mouth once  daily. 90 tablet 3    carboxymethylcellulose-citric (PLENITY) 0.75 gram Cap Take 1 capsule by mouth 2 (two) times a day. With lunch and dinner 180 capsule 3    carvediloL (COREG) 12.5 MG tablet TAKE ONE TABLET BY MOUTH TWICE DAILY WITH MEALS FOR BLOOD PRESSURE 180 tablet 3    desloratadine (CLARINEX) 5 mg tablet Take 1 tablet (5 mg total) by mouth once daily. 30 tablet 11    dextroamphetamine-amphetamine 10 mg Tab TAKE ONE TABLET BY MOUTH TWICE DAILY (fill on 2019) 60 tablet 0    dextroamphetamine-amphetamine 10 mg Tab TAKE ONE TABLET BY MOUTH TWICE DAILY (fill on 2019) 60 tablet 0    dextroamphetamine-amphetamine 10 mg Tab Take 1 tablet (10 mg total) by mouth 2 (two) times daily. 60 tablet 0    dextroamphetamine-amphetamine 10 mg Tab Take 1 tablet (10 mg total) by mouth 2 (two) times daily. 60 tablet 0    dextroamphetamine-amphetamine 10 mg Tab Take 1 tablet (10 mg total) by mouth 2 (two) times daily. 60 tablet 0    dextroamphetamine-amphetamine 10 mg Tab TAKE ONE TABLET BY MOUTH TWICE DAILY. 60 tablet 0    diazePAM (VALIUM) 10 MG Tab TAKE ONE TABLET BY MOUTH TWICE DAILY AS NEEDED FOR ANXIETY 60 tablet 2    fluticasone propionate (FLONASE) 50 mcg/actuation nasal spray SMARTSI Spray(s) Both Nares Every Evening      lisinopriL 10 MG tablet Take 1 tablet (10 mg total) by mouth 2 (two) times daily. 180 tablet 3    tirzepatide (MOUNJARO) 2.5 mg/0.5 mL PnIj Inject 2.5 mg into the skin every 7 days. 4 pen 11    VIIBRYD 20 mg Tab TAKE ONE TABLET BY MOUTH ONCE DAILY 30 tablet 11     No current facility-administered medications for this visit.                  REVIEW OF SYSTEMS:   Review of Systems   Constitutional:  Positive for fatigue (improved). Negative for fever, chills, weight loss, weight gain, activity change, appetite change and night sweats.   HENT:  Negative for congestion.    Eyes: Negative.    Respiratory:  Negative for snoring, cough, sputum production, chest tightness, wheezing, orthopnea,  "previous hospitialization due to pulmonary problems, dyspnea on extertion, use of rescue inhaler and somnolence.    Cardiovascular:  Negative for chest pain, palpitations and leg swelling.   Genitourinary: Negative.    Endocrine: endocrine negative    Musculoskeletal:  Negative for arthralgias and gait problem.   Skin: Negative.    Gastrointestinal:  Negative for acid reflux.   Neurological: Negative.    Psychiatric/Behavioral:  Positive for sleep disturbance (improved).       PHYSICAL EXAM:  Vitals:    10/03/22 1059   BP: 101/65   Pulse: 98   Temp: 98.9 °F (37.2 °C)   SpO2: 96%   Weight: 126.9 kg (279 lb 10.5 oz)   Height: 5' 8" (1.727 m)   PainSc: 0-No pain       Body mass index is 42.52 kg/m².   Physical Exam   Constitutional: He is oriented to person, place, and time. He appears well-developed. No distress. He is obese.   HENT:   Head: Normocephalic.   Cardiovascular: Normal rate, regular rhythm and normal heart sounds.   Pulmonary/Chest: Normal expansion, effort normal and breath sounds normal. No respiratory distress.   Musculoskeletal:         General: No edema.   Neurological: He is alert and oriented to person, place, and time.   Skin: He is not diaphoretic.   Psychiatric: He has a normal mood and affect. His behavior is normal. Judgment and thought content normal.                                             DATA:  TSH:  Lab Results   Component Value Date    TSH 3.658 11/03/2021     CBC:  Lab Results   Component Value Date    WBC 5.86 04/21/2022    HGB 14.3 04/21/2022    HCT 44.7 04/21/2022    MCV 88 04/21/2022     04/21/2022     BMP:  Lab Results   Component Value Date     11/03/2021    K 3.9 11/03/2021     11/03/2021    CO2 24 11/03/2021    BUN 10 11/03/2021    CREATININE 0.9 11/03/2021    CALCIUM 9.1 11/03/2021    ANIONGAP 10 11/03/2021    ESTGFRAFRICA >60 11/03/2021    EGFRNONAA >60 11/03/2021     HgbA1C:  Lab Results   Component Value Date    HGBA1C 5.8 (H) 11/03/2021    "       ASSESSMENT    ICD-10-CM ICD-9-CM    1. VALENTÍN on CPAP  G47.33 327.23 CPAP/BIPAP SUPPLIES    Z99.89 V46.8             PLAN:    Problem List Items Addressed This Visit          Unprioritized    VALENTÍN on CPAP - Primary    Overview     Patient with symptoms of  Daytime sleepiness ESS 8, long irritated uvula, snoring and fatigue .  PSG 2/1/2021:    The overall AHI was 7.6 with an oxygen tracey of 85.0%. The AHI in REM sleep was 24.9. The central apnea index was 0.2. The supine AHI was 7.2.   Patient is using and benefiting from cpap 4-11 cm H2O. Residual predicted AHI optimal.          Relevant Orders    CPAP/BIPAP SUPPLIES           Patient will Follow up in about 1 year (around 10/3/2023), or if symptoms worsen or fail to improve.

## 2022-11-09 DIAGNOSIS — I10 HYPERTENSION, ESSENTIAL: ICD-10-CM

## 2022-11-15 ENCOUNTER — OFFICE VISIT (OUTPATIENT)
Dept: FAMILY MEDICINE | Facility: CLINIC | Age: 42
End: 2022-11-15
Payer: COMMERCIAL

## 2022-11-15 ENCOUNTER — TELEPHONE (OUTPATIENT)
Dept: FAMILY MEDICINE | Facility: CLINIC | Age: 42
End: 2022-11-15

## 2022-11-15 VITALS
HEIGHT: 68 IN | HEART RATE: 94 BPM | OXYGEN SATURATION: 95 % | TEMPERATURE: 98 F | DIASTOLIC BLOOD PRESSURE: 64 MMHG | SYSTOLIC BLOOD PRESSURE: 110 MMHG | WEIGHT: 282.63 LBS | BODY MASS INDEX: 42.83 KG/M2

## 2022-11-15 DIAGNOSIS — R73.03 PREDIABETES: ICD-10-CM

## 2022-11-15 DIAGNOSIS — F98.8 ATTENTION DEFICIT DISORDER, UNSPECIFIED HYPERACTIVITY PRESENCE: ICD-10-CM

## 2022-11-15 DIAGNOSIS — Z00.00 ANNUAL PHYSICAL EXAM: Primary | ICD-10-CM

## 2022-11-15 DIAGNOSIS — E66.01 SEVERE OBESITY: ICD-10-CM

## 2022-11-15 PROBLEM — R94.31 ABNORMAL ECG: Status: ACTIVE | Noted: 2018-02-01

## 2022-11-15 PROBLEM — F41.8 ANXIETY ABOUT HEALTH: Status: ACTIVE | Noted: 2017-02-22

## 2022-11-15 PROBLEM — R45.89 ANXIETY ABOUT HEALTH: Status: ACTIVE | Noted: 2017-02-22

## 2022-11-15 PROCEDURE — 3078F PR MOST RECENT DIASTOLIC BLOOD PRESSURE < 80 MM HG: ICD-10-PCS | Mod: CPTII,S$GLB,, | Performed by: FAMILY MEDICINE

## 2022-11-15 PROCEDURE — 99396 PREV VISIT EST AGE 40-64: CPT | Mod: S$GLB,,, | Performed by: FAMILY MEDICINE

## 2022-11-15 PROCEDURE — 3008F BODY MASS INDEX DOCD: CPT | Mod: CPTII,S$GLB,, | Performed by: FAMILY MEDICINE

## 2022-11-15 PROCEDURE — 3008F PR BODY MASS INDEX (BMI) DOCUMENTED: ICD-10-PCS | Mod: CPTII,S$GLB,, | Performed by: FAMILY MEDICINE

## 2022-11-15 PROCEDURE — 99396 PR PREVENTIVE VISIT,EST,40-64: ICD-10-PCS | Mod: S$GLB,,, | Performed by: FAMILY MEDICINE

## 2022-11-15 PROCEDURE — 1159F PR MEDICATION LIST DOCUMENTED IN MEDICAL RECORD: ICD-10-PCS | Mod: CPTII,S$GLB,, | Performed by: FAMILY MEDICINE

## 2022-11-15 PROCEDURE — 1160F PR REVIEW ALL MEDS BY PRESCRIBER/CLIN PHARMACIST DOCUMENTED: ICD-10-PCS | Mod: CPTII,S$GLB,, | Performed by: FAMILY MEDICINE

## 2022-11-15 PROCEDURE — 99999 PR PBB SHADOW E&M-EST. PATIENT-LVL IV: CPT | Mod: PBBFAC,,, | Performed by: FAMILY MEDICINE

## 2022-11-15 PROCEDURE — 3078F DIAST BP <80 MM HG: CPT | Mod: CPTII,S$GLB,, | Performed by: FAMILY MEDICINE

## 2022-11-15 PROCEDURE — 4010F ACE/ARB THERAPY RXD/TAKEN: CPT | Mod: CPTII,S$GLB,, | Performed by: FAMILY MEDICINE

## 2022-11-15 PROCEDURE — 1159F MED LIST DOCD IN RCRD: CPT | Mod: CPTII,S$GLB,, | Performed by: FAMILY MEDICINE

## 2022-11-15 PROCEDURE — 1160F RVW MEDS BY RX/DR IN RCRD: CPT | Mod: CPTII,S$GLB,, | Performed by: FAMILY MEDICINE

## 2022-11-15 PROCEDURE — 3074F PR MOST RECENT SYSTOLIC BLOOD PRESSURE < 130 MM HG: ICD-10-PCS | Mod: CPTII,S$GLB,, | Performed by: FAMILY MEDICINE

## 2022-11-15 PROCEDURE — 3074F SYST BP LT 130 MM HG: CPT | Mod: CPTII,S$GLB,, | Performed by: FAMILY MEDICINE

## 2022-11-15 PROCEDURE — 4010F PR ACE/ARB THEARPY RXD/TAKEN: ICD-10-PCS | Mod: CPTII,S$GLB,, | Performed by: FAMILY MEDICINE

## 2022-11-15 PROCEDURE — 99999 PR PBB SHADOW E&M-EST. PATIENT-LVL IV: ICD-10-PCS | Mod: PBBFAC,,, | Performed by: FAMILY MEDICINE

## 2022-11-15 RX ORDER — DILTIAZEM HYDROCHLORIDE 240 MG/1
240 CAPSULE, EXTENDED RELEASE ORAL
COMMUNITY
Start: 2022-06-15 | End: 2023-08-30

## 2022-11-15 RX ORDER — DEXTROAMPHETAMINE SACCHARATE, AMPHETAMINE ASPARTATE, DEXTROAMPHETAMINE SULFATE AND AMPHETAMINE SULFATE 2.5; 2.5; 2.5; 2.5 MG/1; MG/1; MG/1; MG/1
TABLET ORAL
Qty: 60 TABLET | Refills: 0 | Status: SHIPPED | OUTPATIENT
Start: 2023-01-13 | End: 2023-02-07 | Stop reason: SDUPTHER

## 2022-11-15 RX ORDER — DEXTROAMPHETAMINE SACCHARATE, AMPHETAMINE ASPARTATE, DEXTROAMPHETAMINE SULFATE AND AMPHETAMINE SULFATE 2.5; 2.5; 2.5; 2.5 MG/1; MG/1; MG/1; MG/1
TABLET ORAL
Qty: 60 TABLET | Refills: 0 | Status: SHIPPED | OUTPATIENT
Start: 2022-12-14 | End: 2023-02-07 | Stop reason: SDUPTHER

## 2022-11-15 RX ORDER — DEXTROAMPHETAMINE SACCHARATE, AMPHETAMINE ASPARTATE, DEXTROAMPHETAMINE SULFATE AND AMPHETAMINE SULFATE 2.5; 2.5; 2.5; 2.5 MG/1; MG/1; MG/1; MG/1
TABLET ORAL
Qty: 60 TABLET | Refills: 0 | Status: SHIPPED | OUTPATIENT
Start: 2022-11-15 | End: 2023-02-07 | Stop reason: SDUPTHER

## 2022-11-15 RX ORDER — HYDROCHLOROTHIAZIDE 25 MG/1
25 TABLET ORAL
COMMUNITY
Start: 2022-09-06 | End: 2023-09-06

## 2022-11-15 RX ORDER — TIRZEPATIDE 5 MG/.5ML
5 INJECTION, SOLUTION SUBCUTANEOUS
Qty: 4 PEN | Refills: 11 | Status: SHIPPED | OUTPATIENT
Start: 2022-11-15 | End: 2023-05-30 | Stop reason: ALTCHOICE

## 2022-11-15 NOTE — PROGRESS NOTES
Chief Complaint   Patient presents with    Annual Exam       SUBJECTIVE:   Garth Tejeda is a 42 y.o. male presenting for his annual checkup.   Current Outpatient Medications   Medication Sig Dispense Refill    diltiaZEM (TIAZAC) 240 MG Cs24 Take 240 mg by mouth.      hydroCHLOROthiazide (HYDRODIURIL) 25 MG tablet Take 25 mg by mouth.      albuterol (PROVENTIL/VENTOLIN HFA) 90 mcg/actuation inhaler Inhale 2 puffs into the lungs every 6 (six) hours as needed for Wheezing. 18 g 11    aspirin (ECOTRIN) 81 MG EC tablet Take 81 mg by mouth once daily.      atorvastatin (LIPITOR) 10 MG tablet Take 1 tablet (10 mg total) by mouth once daily. 90 tablet 3    desloratadine (CLARINEX) 5 mg tablet Take 1 tablet (5 mg total) by mouth once daily. 30 tablet 11    dextroamphetamine-amphetamine 10 mg Tab TAKE ONE TABLET BY MOUTH TWICE DAILY (fill on 2019) 60 tablet 0    dextroamphetamine-amphetamine 10 mg Tab TAKE ONE TABLET BY MOUTH TWICE DAILY (fill on 2019) 60 tablet 0    dextroamphetamine-amphetamine 10 mg Tab Take 1 tablet (10 mg total) by mouth 2 (two) times daily. 60 tablet 0    dextroamphetamine-amphetamine 10 mg Tab Take 1 tablet (10 mg total) by mouth 2 (two) times daily. 60 tablet 0    dextroamphetamine-amphetamine 10 mg Tab Take 1 tablet (10 mg total) by mouth 2 (two) times daily. 60 tablet 0    dextroamphetamine-amphetamine 10 mg Tab TAKE ONE TABLET BY MOUTH TWICE DAILY. 60 tablet 0    diazePAM (VALIUM) 10 MG Tab TAKE ONE TABLET BY MOUTH TWICE DAILY AS NEEDED FOR ANXIETY 60 tablet 2    fluticasone propionate (FLONASE) 50 mcg/actuation nasal spray SMARTSI Spray(s) Both Nares Every Evening      lisinopriL 10 MG tablet Take 1 tablet (10 mg total) by mouth 2 (two) times daily. 180 tablet 3    tirzepatide (MOUNJARO) 5 mg/0.5 mL PnIj Inject 5 mg into the skin every 7 days. 4 pen 11    VIIBRYD 20 mg Tab TAKE ONE TABLET BY MOUTH ONCE DAILY 30 tablet 11     No current facility-administered medications for this  "visit.     Allergies: Patient has no known allergies.   Patient Active Problem List    Diagnosis Date Noted    Prediabetes 09/15/2022    Severe obesity (BMI 35.0-39.9) with comorbidity 10/26/2021    Cardiomyopathy, unspecified type 10/26/2021    VALENTÍN (obstructive sleep apnea) 02/19/2021    Bacterial sinusitis 01/05/2021    Body water dehydration 09/23/2019    Abnormal ECG 02/01/2018    Mild obesity 11/08/2017    Anxiety about health 02/22/2017    ADD (attention deficit disorder) 02/22/2017    Hypertension, essential 02/22/2017    Left subclavian artery occlusion 03/28/2016    History of congenital heart defect 12/31/2015    Diminished pulse 12/31/2015       ROS:  Feeling well. No dyspnea or chest pain on exertion. No abdominal pain, change in bowel habits, black or bloody stools. No urinary tract or prostatic symptoms. No neurological complaints.    OBJECTIVE:   The patient appears well, alert, oriented x 3, in no distress.   /64   Pulse 94   Temp 97.6 °F (36.4 °C) (Oral)   Ht 5' 8" (1.727 m)   Wt 128.2 kg (282 lb 10.1 oz)   SpO2 95%   BMI 42.97 kg/m²   Wt Readings from Last 5 Encounters:   11/15/22 128.2 kg (282 lb 10.1 oz)   10/03/22 126.9 kg (279 lb 10.5 oz)   04/07/22 123.3 kg (271 lb 13.2 oz)   10/26/21 124.2 kg (273 lb 13 oz)   06/28/21 121.6 kg (268 lb 1.3 oz)       ENT normal.  Neck supple. No adenopathy or thyromegaly. DEANNA. Lungs are clear, good air entry, no wheezes, rhonchi or rales. S1 and S2 normal, no murmurs, regular rate and rhythm. Abdomen is soft without tenderness, guarding, mass or organomegaly.  exam: deferred.  Extremities show no edema, normal peripheral pulses. Neurological is normal without focal findings.    ASSESSMENT:   1. Annual physical exam    2. Severe obesity    3. Prediabetes          PLAN:   Counseled on age appropriate medical preventative services, including age appropriate cancer screenings, over all nutritional health, need for a consistent exercise regimen and " an over all push towards maintaining a vigorous and active lifestyle.  Counseled on age appropriate vaccines and discussed upcoming health care needs based on age/gender.  Spent time with patient counseling on need for a good patient/doctor relationship moving forward.  Discussed use of common OTC medications and supplements.  Discussed common dietary aids and use of caffeine and the need for good sleep hygiene and stress management.    Problem List Items Addressed This Visit       Prediabetes    Relevant Medications    tirzepatide (MOUNJARO) 5 mg/0.5 mL PnIj     Other Visit Diagnoses       Annual physical exam    -  Primary    Severe obesity        Relevant Medications    tirzepatide (MOUNJARO) 5 mg/0.5 mL PnIj

## 2022-11-15 NOTE — TELEPHONE ENCOUNTER
PA submitted via Cover my meds   MOUNJARO    [facility] is reviewing your PA request and will respond within 24 hours for Medicaid or up to 72 hours for non-Medicaid plans, based on the required timeframe determined by state or federal regulations. To check for an update later, open this request from your dashboard.

## 2022-12-10 NOTE — TELEPHONE ENCOUNTER
LOV 4/2/2018  Last refill  11/8/2017     Patient reporting increased pain and discomfort.  Requesting an epidural for pain relief.  MDA called and in room at 0129. Patient and procedure correctly identified/ verified with MDA. Consent signed. 1000 mL fluid bolus given prior to epidural placement.  Epidural placed by MDA without complication. Test dose/ bolus given by MDA, patient tolerated well. Patient reports pain relief after procedure.

## 2023-01-20 DIAGNOSIS — R73.03 PREDIABETES: ICD-10-CM

## 2023-01-20 DIAGNOSIS — E66.01 SEVERE OBESITY: ICD-10-CM

## 2023-02-07 ENCOUNTER — OFFICE VISIT (OUTPATIENT)
Dept: FAMILY MEDICINE | Facility: CLINIC | Age: 43
End: 2023-02-07
Payer: COMMERCIAL

## 2023-02-07 DIAGNOSIS — J30.9 ALLERGIC RHINITIS, UNSPECIFIED SEASONALITY, UNSPECIFIED TRIGGER: ICD-10-CM

## 2023-02-07 DIAGNOSIS — F98.8 ATTENTION DEFICIT DISORDER, UNSPECIFIED HYPERACTIVITY PRESENCE: ICD-10-CM

## 2023-02-07 DIAGNOSIS — I70.8 LEFT SUBCLAVIAN ARTERY OCCLUSION: ICD-10-CM

## 2023-02-07 DIAGNOSIS — F41.9 ANXIETY: ICD-10-CM

## 2023-02-07 DIAGNOSIS — Z87.74 HISTORY OF CONGENITAL HEART DEFECT: ICD-10-CM

## 2023-02-07 DIAGNOSIS — J06.9 UPPER RESPIRATORY TRACT INFECTION, UNSPECIFIED TYPE: ICD-10-CM

## 2023-02-07 PROCEDURE — 99214 PR OFFICE/OUTPT VISIT, EST, LEVL IV, 30-39 MIN: ICD-10-PCS | Mod: 95,,, | Performed by: FAMILY MEDICINE

## 2023-02-07 PROCEDURE — 99214 OFFICE O/P EST MOD 30 MIN: CPT | Mod: 95,,, | Performed by: FAMILY MEDICINE

## 2023-02-08 RX ORDER — LISINOPRIL 10 MG/1
10 TABLET ORAL 2 TIMES DAILY
Qty: 180 TABLET | Refills: 3 | Status: SHIPPED | OUTPATIENT
Start: 2023-02-08

## 2023-02-08 RX ORDER — DEXTROAMPHETAMINE SACCHARATE, AMPHETAMINE ASPARTATE, DEXTROAMPHETAMINE SULFATE AND AMPHETAMINE SULFATE 2.5; 2.5; 2.5; 2.5 MG/1; MG/1; MG/1; MG/1
TABLET ORAL
Qty: 60 TABLET | Refills: 0 | Status: SHIPPED | OUTPATIENT
Start: 2023-03-06 | End: 2023-08-30 | Stop reason: ALTCHOICE

## 2023-02-08 RX ORDER — DEXTROAMPHETAMINE SACCHARATE, AMPHETAMINE ASPARTATE, DEXTROAMPHETAMINE SULFATE AND AMPHETAMINE SULFATE 2.5; 2.5; 2.5; 2.5 MG/1; MG/1; MG/1; MG/1
TABLET ORAL
Qty: 60 TABLET | Refills: 0 | Status: SHIPPED | OUTPATIENT
Start: 2023-02-08 | End: 2023-03-08 | Stop reason: SDUPTHER

## 2023-02-08 RX ORDER — DESLORATADINE 5 MG/1
5 TABLET ORAL DAILY
Qty: 30 TABLET | Refills: 11 | Status: SHIPPED | OUTPATIENT
Start: 2023-02-08 | End: 2024-01-08

## 2023-02-08 RX ORDER — ALBUTEROL SULFATE 90 UG/1
2 AEROSOL, METERED RESPIRATORY (INHALATION) EVERY 6 HOURS PRN
Qty: 18 G | Refills: 11 | Status: SHIPPED | OUTPATIENT
Start: 2023-02-08

## 2023-02-08 RX ORDER — ATORVASTATIN CALCIUM 10 MG/1
10 TABLET, FILM COATED ORAL DAILY
Qty: 90 TABLET | Refills: 3 | Status: SHIPPED | OUTPATIENT
Start: 2023-02-08 | End: 2023-12-26

## 2023-02-08 RX ORDER — DEXTROAMPHETAMINE SACCHARATE, AMPHETAMINE ASPARTATE, DEXTROAMPHETAMINE SULFATE AND AMPHETAMINE SULFATE 2.5; 2.5; 2.5; 2.5 MG/1; MG/1; MG/1; MG/1
TABLET ORAL
Qty: 60 TABLET | Refills: 0 | Status: SHIPPED | OUTPATIENT
Start: 2023-04-05 | End: 2023-05-30 | Stop reason: SDUPTHER

## 2023-02-08 RX ORDER — DIAZEPAM 10 MG/1
10 TABLET ORAL 2 TIMES DAILY
Qty: 60 TABLET | Refills: 2 | Status: SHIPPED | OUTPATIENT
Start: 2023-02-07 | End: 2024-01-06 | Stop reason: SDUPTHER

## 2023-02-08 NOTE — PROGRESS NOTES
No chief complaint on file.      SUBJECTIVE:  Garth Tejeda is a 42 y.o. male here for follow up of ADHD.   Patient has ADHD and is well controleld without drug side effects and doing well overall without any unusual symptoms or history.      The patient location is: [place]  The chief complaint leading to consultation is: medication refill    Visit type: audiovisual    Face to Face time with patient: 10  15 minutes of total time spent on the encounter, which includes face to face time and non-face to face time preparing to see the patient (eg, review of tests), Obtaining and/or reviewing separately obtained history, Documenting clinical information in the electronic or other health record, Independently interpreting results (not separately reported) and communicating results to the patient/family/caregiver, or Care coordination (not separately reported).         Each patient to whom he or she provides medical services by telemedicine is:  (1) informed of the relationship between the physician and patient and the respective role of any other health care provider with respect to management of the patient; and (2) notified that he or she may decline to receive medical services by telemedicine and may withdraw from such care at any time.    Notes:   Answers submitted by the patient for this visit:  Review of Systems Questionnaire (Submitted on 2/7/2023)  activity change: No  unexpected weight change: No  rhinorrhea: No  trouble swallowing: No  visual disturbance: No  chest tightness: No  polyuria: No  difficulty urinating: No  joint swelling: No  arthralgias: No  confusion: No  dysphoric mood: No         Currently has co-morbidities including below problem list.        Patient Active Problem List    Diagnosis Date Noted    Prediabetes 09/15/2022    Severe obesity (BMI 35.0-39.9) with comorbidity 10/26/2021    Cardiomyopathy, unspecified type 10/26/2021    VALENTÍN (obstructive sleep apnea) 02/19/2021     Patient with symptoms  of  Daytime sleepiness ESS 8, long irritated uvula, snoring and fatigue .  PSG 2/1/2021:    The overall AHI was 7.6 with an oxygen tracey of 85.0%. The AHI in REM sleep was 24.9. The central apnea index was 0.2. The supine AHI was 7.2.   Patient is using and benefiting from cpap 4-11 cm H2O. Residual predicted AHI optimal.       Bacterial sinusitis 01/05/2021    Body water dehydration 09/23/2019    Abnormal ECG 02/01/2018     Formatting of this note might be different from the original.  Echocardiogram shows upper normal LV size, normal LV thickness, aortic root borderline increased size, left atrium probably normal in size based on KATARZYNA of 16, EF 50%, diastolic function normal, mild TR.      Mild obesity 11/08/2017    Anxiety about health 02/22/2017     Long standing  Seen by psych, no admissions  effexor was weight gain  Ativan PRN      ADD (attention deficit disorder) 02/22/2017    Hypertension, essential 02/22/2017    Left subclavian artery occlusion 03/28/2016     Secondary to muscle impingement, has collateral flow now, just monitoring      History of congenital heart defect 12/31/2015    Diminished pulse 12/31/2015     LEFT UPPER EXTREMITY         Review of Systems   HENT:  Negative for hearing loss.    Eyes:  Negative for discharge.   Respiratory:  Negative for wheezing.    Cardiovascular:  Negative for chest pain and palpitations.   Gastrointestinal:  Negative for blood in stool, constipation, diarrhea and vomiting.   Genitourinary:  Negative for hematuria and urgency.   Musculoskeletal:  Negative for neck pain.   Neurological:  Negative for weakness and headaches.   Endo/Heme/Allergies:  Negative for polydipsia.     OBJECTIVE:  There were no vitals taken for this visit.    Wt Readings from Last 3 Encounters:   11/15/22 128.2 kg (282 lb 10.1 oz)   10/03/22 126.9 kg (279 lb 10.5 oz)   04/07/22 123.3 kg (271 lb 13.2 oz)     BP Readings from Last 3 Encounters:   11/15/22 110/64   10/03/22 101/65   04/07/22 (!)  80/60       Patient is in usual state of health, alert and oriented without signs of intoxication.  No evidence on exam of illegal substance use or concerning symptoms involving abuse or intoxication (specifically evidence of IVDU, unusual bruising, slurred speech, unusual explanations).    He appears well, in no apparent distress.  Alert and oriented times three, pleasant and cooperative. Vital signs are as documented in vital signs section.             Review of old Records:  Reviewed per epic and   NARx OD score/stimulant score: reviewed  Review of old labs:    Lab Results   Component Value Date    TSH 3.658 11/03/2021     Lab Results   Component Value Date    WBC 5.86 04/21/2022    HGB 14.3 04/21/2022    HCT 44.7 04/21/2022    MCV 88 04/21/2022     04/21/2022       Chemistry        Component Value Date/Time     11/03/2021 0743    K 3.9 11/03/2021 0743     11/03/2021 0743    CO2 24 11/03/2021 0743    BUN 10 11/03/2021 0743    CREATININE 0.9 11/03/2021 0743     11/03/2021 0743        Component Value Date/Time    CALCIUM 9.1 11/03/2021 0743    ALKPHOS 79 11/03/2021 0743    AST 17 11/03/2021 0743    ALT 25 11/03/2021 0743    BILITOT 0.3 11/03/2021 0743    ESTGFRAFRICA >60 11/03/2021 0743    EGFRNONAA >60 11/03/2021 0743        Lab Results   Component Value Date    CHOL 123 11/03/2021    CHOL 159 08/15/2018    CHOL 138 11/17/2017     Lab Results   Component Value Date    HDL 42 11/03/2021    HDL 46 08/15/2018    HDL 46 11/17/2017     Lab Results   Component Value Date    LDLCALC 61.4 (L) 11/03/2021    LDLCALC 86.4 08/15/2018    LDLCALC 73.0 11/17/2017     Lab Results   Component Value Date    TRIG 98 11/03/2021    TRIG 133 08/15/2018    TRIG 95 11/17/2017     Lab Results   Component Value Date    CHOLHDL 34.1 11/03/2021    CHOLHDL 28.9 08/15/2018    CHOLHDL 33.3 11/17/2017         Review of old imaging:  Reviewed last EKG tracing if available.          ASSESSMENT:    ICD-10-CM ICD-9-CM    1. Attention deficit disorder, unspecified hyperactivity presence  F98.8 314.00   2. Anxiety  F41.9 300.00   3. Allergic rhinitis, unspecified seasonality, unspecified trigger  J30.9 477.9   4. Upper respiratory tract infection, unspecified type  J06.9 465.9   5. Left subclavian artery occlusion  I70.8 444.89   6. History of congenital heart defect  Z87.74 V13.65       No evidence of abuse/diversion/addiction noted through history and physical, or checking the .  Risks, benefits and alternate treatments are considered and plan of care reflects that shared decision with the patient.  Went over schedule II responsibilities, including abuse, side effects, refill restrictions, necessary visits, drug testing, protection of medication.  Patient voices understanding.      PLAN:    Problem List Items Addressed This Visit       History of congenital heart defect    Relevant Medications    lisinopriL 10 MG tablet    Left subclavian artery occlusion    Overview     Secondary to muscle impingement, has collateral flow now, just monitoring         Relevant Medications    lisinopriL 10 MG tablet    atorvastatin (LIPITOR) 10 MG tablet    ADD (attention deficit disorder)    Relevant Medications    dextroamphetamine-amphetamine 10 mg Tab    dextroamphetamine-amphetamine 10 mg Tab (Start on 3/6/2023)    dextroamphetamine-amphetamine 10 mg Tab (Start on 4/5/2023)     Other Visit Diagnoses       Anxiety        Relevant Medications    diazePAM (VALIUM) 10 MG Tab    Allergic rhinitis, unspecified seasonality, unspecified trigger        Relevant Medications    desloratadine (CLARINEX) 5 mg tablet    Upper respiratory tract infection, unspecified type        Relevant Medications    desloratadine (CLARINEX) 5 mg tablet    albuterol (PROVENTIL/VENTOLIN HFA) 90 mcg/actuation inhaler              Medication List with Changes/Refills   Current Medications    ASPIRIN (ECOTRIN) 81 MG EC TABLET    Take 81 mg by mouth once daily.    DILTIAZEM  (TIAZAC) 240 MG CS24    Take 240 mg by mouth.    FLUTICASONE PROPIONATE (FLONASE) 50 MCG/ACTUATION NASAL SPRAY    SMARTSI Spray(s) Both Nares Every Evening    HYDROCHLOROTHIAZIDE (HYDRODIURIL) 25 MG TABLET    Take 25 mg by mouth.    TIRZEPATIDE (MOUNJARO) 5 MG/0.5 ML PNIJ    Inject 5 mg into the skin every 7 days.    TIRZEPATIDE 7.5 MG/0.5 ML PNIJ    Inject 7.5 mg into the skin every 7 days.   Changed and/or Refilled Medications    Modified Medication Previous Medication    ALBUTEROL (PROVENTIL/VENTOLIN HFA) 90 MCG/ACTUATION INHALER albuterol (PROVENTIL/VENTOLIN HFA) 90 mcg/actuation inhaler       Inhale 2 puffs into the lungs every 6 (six) hours as needed for Wheezing.    Inhale 2 puffs into the lungs every 6 (six) hours as needed for Wheezing.    ATORVASTATIN (LIPITOR) 10 MG TABLET atorvastatin (LIPITOR) 10 MG tablet       Take 1 tablet (10 mg total) by mouth once daily.    Take 1 tablet (10 mg total) by mouth once daily.    DESLORATADINE (CLARINEX) 5 MG TABLET desloratadine (CLARINEX) 5 mg tablet       Take 1 tablet (5 mg total) by mouth once daily.    Take 1 tablet (5 mg total) by mouth once daily.    DEXTROAMPHETAMINE-AMPHETAMINE 10 MG TAB dextroamphetamine-amphetamine 10 mg Tab       TAKE ONE TABLET BY MOUTH TWICE DAILY    TAKE ONE TABLET BY MOUTH TWICE DAILY (fill on 2019)    DEXTROAMPHETAMINE-AMPHETAMINE 10 MG TAB dextroamphetamine-amphetamine 10 mg Tab       TAKE ONE TABLET BY MOUTH TWICE DAILY    TAKE ONE TABLET BY MOUTH TWICE DAILY (fill on 2019)    DEXTROAMPHETAMINE-AMPHETAMINE 10 MG TAB dextroamphetamine-amphetamine 10 mg Tab       TAKE ONE TABLET BY MOUTH TWICE DAILY    TAKE ONE TABLET BY MOUTH TWICE DAILY (fill on 2019)    DIAZEPAM (VALIUM) 10 MG TAB diazePAM (VALIUM) 10 MG Tab       Take 1 tablet (10 mg total) by mouth 2 (two) times daily.    TAKE ONE TABLET BY MOUTH TWICE DAILY AS NEEDED FOR ANXIETY    LISINOPRIL 10 MG TABLET lisinopriL 10 MG tablet       Take 1 tablet (10 mg  total) by mouth 2 (two) times daily.    TAKE ONE TABLET BY MOUTH TWICE DAILY FOR BLOOD PRESSURE.   Discontinued Medications    VIIBRYD 20 MG TAB    TAKE ONE TABLET BY MOUTH ONCE DAILY         Continue with current care unless changes noted below.    High risk medication review completed per guidelines to insure safest use of medication.      We reviewed our goals of care:    Improvement in concentration and mood  Aid focus in completing tasks at work/school  Safety first priority      And discontinuation      Intolerable side effects  Evidence of abuse/misuse or diversion    Future Appointments   Date Time Provider Department Center   3/8/2023  1:20 PM Gómez Parisi MD [facility]        or sooner as needed

## 2023-03-02 ENCOUNTER — PATIENT OUTREACH (OUTPATIENT)
Dept: ADMINISTRATIVE | Facility: HOSPITAL | Age: 43
End: 2023-03-02
Payer: COMMERCIAL

## 2023-03-02 NOTE — PROGRESS NOTES
Health Maintenance Due   Topic Date Due    HIV Screening  Never done    TETANUS VACCINE  Never done    COVID-19 Vaccine (3 - Booster for Pfizer series) 04/22/2021    Influenza Vaccine (1) 09/01/2022    Lipid Panel  11/03/2022     Chart review done. HM updated. Immunizations reviewed & updated. Care Everywhere updated.

## 2023-03-08 ENCOUNTER — OFFICE VISIT (OUTPATIENT)
Dept: FAMILY MEDICINE | Facility: CLINIC | Age: 43
End: 2023-03-08
Payer: COMMERCIAL

## 2023-03-08 VITALS
OXYGEN SATURATION: 97 % | BODY MASS INDEX: 45 KG/M2 | SYSTOLIC BLOOD PRESSURE: 110 MMHG | TEMPERATURE: 98 F | WEIGHT: 280 LBS | HEIGHT: 66 IN | HEART RATE: 95 BPM | DIASTOLIC BLOOD PRESSURE: 70 MMHG

## 2023-03-08 DIAGNOSIS — E66.01 SEVERE OBESITY: ICD-10-CM

## 2023-03-08 DIAGNOSIS — R73.03 PREDIABETES: ICD-10-CM

## 2023-03-08 DIAGNOSIS — F98.8 ATTENTION DEFICIT DISORDER, UNSPECIFIED HYPERACTIVITY PRESENCE: ICD-10-CM

## 2023-03-08 PROCEDURE — 3078F PR MOST RECENT DIASTOLIC BLOOD PRESSURE < 80 MM HG: ICD-10-PCS | Mod: CPTII,S$GLB,, | Performed by: FAMILY MEDICINE

## 2023-03-08 PROCEDURE — 1159F MED LIST DOCD IN RCRD: CPT | Mod: CPTII,S$GLB,, | Performed by: FAMILY MEDICINE

## 2023-03-08 PROCEDURE — 3008F PR BODY MASS INDEX (BMI) DOCUMENTED: ICD-10-PCS | Mod: CPTII,S$GLB,, | Performed by: FAMILY MEDICINE

## 2023-03-08 PROCEDURE — 4010F PR ACE/ARB THEARPY RXD/TAKEN: ICD-10-PCS | Mod: CPTII,S$GLB,, | Performed by: FAMILY MEDICINE

## 2023-03-08 PROCEDURE — 99999 PR PBB SHADOW E&M-EST. PATIENT-LVL IV: ICD-10-PCS | Mod: PBBFAC,,, | Performed by: FAMILY MEDICINE

## 2023-03-08 PROCEDURE — 4010F ACE/ARB THERAPY RXD/TAKEN: CPT | Mod: CPTII,S$GLB,, | Performed by: FAMILY MEDICINE

## 2023-03-08 PROCEDURE — 3074F SYST BP LT 130 MM HG: CPT | Mod: CPTII,S$GLB,, | Performed by: FAMILY MEDICINE

## 2023-03-08 PROCEDURE — 3008F BODY MASS INDEX DOCD: CPT | Mod: CPTII,S$GLB,, | Performed by: FAMILY MEDICINE

## 2023-03-08 PROCEDURE — 1159F PR MEDICATION LIST DOCUMENTED IN MEDICAL RECORD: ICD-10-PCS | Mod: CPTII,S$GLB,, | Performed by: FAMILY MEDICINE

## 2023-03-08 PROCEDURE — 3074F PR MOST RECENT SYSTOLIC BLOOD PRESSURE < 130 MM HG: ICD-10-PCS | Mod: CPTII,S$GLB,, | Performed by: FAMILY MEDICINE

## 2023-03-08 PROCEDURE — 3078F DIAST BP <80 MM HG: CPT | Mod: CPTII,S$GLB,, | Performed by: FAMILY MEDICINE

## 2023-03-08 PROCEDURE — 99999 PR PBB SHADOW E&M-EST. PATIENT-LVL IV: CPT | Mod: PBBFAC,,, | Performed by: FAMILY MEDICINE

## 2023-03-08 PROCEDURE — 99214 PR OFFICE/OUTPT VISIT, EST, LEVL IV, 30-39 MIN: ICD-10-PCS | Mod: S$GLB,,, | Performed by: FAMILY MEDICINE

## 2023-03-08 PROCEDURE — 99214 OFFICE O/P EST MOD 30 MIN: CPT | Mod: S$GLB,,, | Performed by: FAMILY MEDICINE

## 2023-03-08 RX ORDER — DEXTROAMPHETAMINE SACCHARATE, AMPHETAMINE ASPARTATE, DEXTROAMPHETAMINE SULFATE AND AMPHETAMINE SULFATE 2.5; 2.5; 2.5; 2.5 MG/1; MG/1; MG/1; MG/1
TABLET ORAL
Qty: 60 TABLET | Refills: 0 | Status: SHIPPED | OUTPATIENT
Start: 2023-05-04 | End: 2023-08-30 | Stop reason: ALTCHOICE

## 2023-03-08 NOTE — PROGRESS NOTES
"Chief Complaint   Patient presents with    Medication Refill       SUBJECTIVE:  Garth Tejeda is a 42 y.o. male here for follow up of ADHD.   Patient has ADHD and is well controleld without drug side effects and doing well overall without any unusual symptoms or history.      Stable and wants to increase the mounjaro  Otherwise doing well       Currently has co-morbidities including below problem list.        Patient Active Problem List    Diagnosis Date Noted    Prediabetes 09/15/2022    Severe obesity (BMI 35.0-39.9) with comorbidity 10/26/2021    Cardiomyopathy, unspecified type 10/26/2021    VALENTÍN (obstructive sleep apnea) 02/19/2021     Patient with symptoms of  Daytime sleepiness ESS 8, long irritated uvula, snoring and fatigue .  PSG 2/1/2021:    The overall AHI was 7.6 with an oxygen tracey of 85.0%. The AHI in REM sleep was 24.9. The central apnea index was 0.2. The supine AHI was 7.2.   Patient is using and benefiting from cpap 4-11 cm H2O. Residual predicted AHI optimal.       Bacterial sinusitis 01/05/2021    Body water dehydration 09/23/2019    Abnormal ECG 02/01/2018     Formatting of this note might be different from the original.  Echocardiogram shows upper normal LV size, normal LV thickness, aortic root borderline increased size, left atrium probably normal in size based on KATARZYNA of 16, EF 50%, diastolic function normal, mild TR.      Mild obesity 11/08/2017    Anxiety about health 02/22/2017     Long standing  Seen by psych, no admissions  effexor was weight gain  Ativan PRN      ADD (attention deficit disorder) 02/22/2017    Hypertension, essential 02/22/2017    Left subclavian artery occlusion 03/28/2016     Secondary to muscle impingement, has collateral flow now, just monitoring      History of congenital heart defect 12/31/2015    Diminished pulse 12/31/2015     LEFT UPPER EXTREMITY         ROS    OBJECTIVE:  /70   Pulse 95   Temp 97.7 °F (36.5 °C)   Ht 5' 6" (1.676 m)   Wt 127 kg " (279 lb 15.8 oz)   SpO2 97%   BMI 45.19 kg/m²     Wt Readings from Last 3 Encounters:   03/08/23 127 kg (279 lb 15.8 oz)   11/15/22 128.2 kg (282 lb 10.1 oz)   10/03/22 126.9 kg (279 lb 10.5 oz)     BP Readings from Last 3 Encounters:   03/08/23 110/70   11/15/22 110/64   10/03/22 101/65       Patient is in usual state of health, alert and oriented without signs of intoxication.  No evidence on exam of illegal substance use or concerning symptoms involving abuse or intoxication (specifically evidence of IVDU, unusual bruising, slurred speech, unusual explanations).               Review of old Records:  Reviewed per epic and [facility]  NARx OD score/stimulant score: reviewed  Review of old labs:    Lab Results   Component Value Date    TSH 3.658 11/03/2021     Lab Results   Component Value Date    WBC 5.86 04/21/2022    HGB 14.3 04/21/2022    HCT 44.7 04/21/2022    MCV 88 04/21/2022     04/21/2022       Chemistry        Component Value Date/Time     11/03/2021 0743    K 3.9 11/03/2021 0743     11/03/2021 0743    CO2 24 11/03/2021 0743    BUN 10 11/03/2021 0743    CREATININE 0.9 11/03/2021 0743     11/03/2021 0743        Component Value Date/Time    CALCIUM 9.1 11/03/2021 0743    ALKPHOS 79 11/03/2021 0743    AST 17 11/03/2021 0743    ALT 25 11/03/2021 0743    BILITOT 0.3 11/03/2021 0743    ESTGFRAFRICA >60 11/03/2021 0743    EGFRNONAA >60 11/03/2021 0743        Lab Results   Component Value Date    CHOL 123 11/03/2021    CHOL 159 08/15/2018    CHOL 138 11/17/2017     Lab Results   Component Value Date    HDL 42 11/03/2021    HDL 46 08/15/2018    HDL 46 11/17/2017     Lab Results   Component Value Date    LDLCALC 61.4 (L) 11/03/2021    LDLCALC 86.4 08/15/2018    LDLCALC 73.0 11/17/2017     Lab Results   Component Value Date    TRIG 98 11/03/2021    TRIG 133 08/15/2018    TRIG 95 11/17/2017     Lab Results   Component Value Date    CHOLHDL 34.1 11/03/2021    CHOLHDL 28.9 08/15/2018    CHOLHDL 33.3  11/17/2017         Review of old imaging:  Reviewed last EKG tracing if available.          ASSESSMENT:    ICD-10-CM ICD-9-CM   1. Attention deficit disorder, unspecified hyperactivity presence  F98.8 314.00   2. Severe obesity  E66.01 278.01   3. Prediabetes  R73.03 790.29       No evidence of abuse/diversion/addiction noted through history and physical, or checking the .  Risks, benefits and alternate treatments are considered and plan of care reflects that shared decision with the patient.  Went over schedule II responsibilities, including abuse, side effects, refill restrictions, necessary visits, drug testing, protection of medication.  Patient voices understanding.      PLAN:    Problem List Items Addressed This Visit       ADD (attention deficit disorder)    Relevant Medications    dextroamphetamine-amphetamine 10 mg Tab (Start on 5/4/2023)    Prediabetes    Relevant Medications    tirzepatide 10 mg/0.5 mL PnIj     Other Visit Diagnoses       Severe obesity        Relevant Medications    tirzepatide 10 mg/0.5 mL PnIj              Medication List with Changes/Refills   New Medications    TIRZEPATIDE 10 MG/0.5 ML PNIJ    Inject 10 mg into the skin every 7 days.   Current Medications    ALBUTEROL (PROVENTIL/VENTOLIN HFA) 90 MCG/ACTUATION INHALER    Inhale 2 puffs into the lungs every 6 (six) hours as needed for Wheezing.    ASPIRIN (ECOTRIN) 81 MG EC TABLET    Take 81 mg by mouth once daily.    ATORVASTATIN (LIPITOR) 10 MG TABLET    Take 1 tablet (10 mg total) by mouth once daily.    DESLORATADINE (CLARINEX) 5 MG TABLET    Take 1 tablet (5 mg total) by mouth once daily.    DEXTROAMPHETAMINE-AMPHETAMINE 10 MG TAB    TAKE ONE TABLET BY MOUTH TWICE DAILY    DEXTROAMPHETAMINE-AMPHETAMINE 10 MG TAB    TAKE ONE TABLET BY MOUTH TWICE DAILY    DIAZEPAM (VALIUM) 10 MG TAB    Take 1 tablet (10 mg total) by mouth 2 (two) times daily.    DILTIAZEM (TIAZAC) 240 MG CS24    Take 240 mg by mouth.    FLUTICASONE PROPIONATE  (FLONASE) 50 MCG/ACTUATION NASAL SPRAY    SMARTSI Spray(s) Both Nares Every Evening    HYDROCHLOROTHIAZIDE (HYDRODIURIL) 25 MG TABLET    Take 25 mg by mouth.    LISINOPRIL 10 MG TABLET    Take 1 tablet (10 mg total) by mouth 2 (two) times daily.    TIRZEPATIDE (MOUNJARO) 5 MG/0.5 ML PNIJ    Inject 5 mg into the skin every 7 days.    TIRZEPATIDE 7.5 MG/0.5 ML PNIJ    Inject 7.5 mg into the skin every 7 days.   Changed and/or Refilled Medications    Modified Medication Previous Medication    DEXTROAMPHETAMINE-AMPHETAMINE 10 MG TAB dextroamphetamine-amphetamine 10 mg Tab       TAKE ONE TABLET BY MOUTH TWICE DAILY    TAKE ONE TABLET BY MOUTH TWICE DAILY         Continue with current care unless changes noted below.  Increase mounjaro  High risk medication review completed per guidelines to insure safest use of medication.      We reviewed our goals of care:    Improvement in concentration and mood  Aid focus in completing tasks at work/school  Safety first priority      And discontinuation      Intolerable side effects  Evidence of abuse/misuse or diversion      Future Appointments   Date Time Provider Department Center   2023  2:40 PM Gómez Parisi MD [facility]       3 months or sooner as needed

## 2023-03-13 ENCOUNTER — TELEPHONE (OUTPATIENT)
Dept: FAMILY MEDICINE | Facility: CLINIC | Age: 43
End: 2023-03-13
Payer: COMMERCIAL

## 2023-03-17 ENCOUNTER — TELEPHONE (OUTPATIENT)
Dept: FAMILY MEDICINE | Facility: CLINIC | Age: 43
End: 2023-03-17
Payer: COMMERCIAL

## 2023-05-30 ENCOUNTER — OFFICE VISIT (OUTPATIENT)
Dept: FAMILY MEDICINE | Facility: CLINIC | Age: 43
End: 2023-05-30
Payer: COMMERCIAL

## 2023-05-30 VITALS
SYSTOLIC BLOOD PRESSURE: 118 MMHG | HEART RATE: 79 BPM | HEIGHT: 67 IN | BODY MASS INDEX: 43.95 KG/M2 | DIASTOLIC BLOOD PRESSURE: 70 MMHG | WEIGHT: 280 LBS | OXYGEN SATURATION: 97 % | TEMPERATURE: 98 F

## 2023-05-30 DIAGNOSIS — E66.01 SEVERE OBESITY: ICD-10-CM

## 2023-05-30 DIAGNOSIS — R73.03 PREDIABETES: ICD-10-CM

## 2023-05-30 DIAGNOSIS — Z00.00 ANNUAL PHYSICAL EXAM: Primary | ICD-10-CM

## 2023-05-30 DIAGNOSIS — F98.8 ATTENTION DEFICIT DISORDER, UNSPECIFIED HYPERACTIVITY PRESENCE: ICD-10-CM

## 2023-05-30 PROCEDURE — 99396 PREV VISIT EST AGE 40-64: CPT | Mod: S$GLB,,, | Performed by: FAMILY MEDICINE

## 2023-05-30 PROCEDURE — 4010F PR ACE/ARB THEARPY RXD/TAKEN: ICD-10-PCS | Mod: CPTII,S$GLB,, | Performed by: FAMILY MEDICINE

## 2023-05-30 PROCEDURE — 4010F ACE/ARB THERAPY RXD/TAKEN: CPT | Mod: CPTII,S$GLB,, | Performed by: FAMILY MEDICINE

## 2023-05-30 PROCEDURE — 1159F PR MEDICATION LIST DOCUMENTED IN MEDICAL RECORD: ICD-10-PCS | Mod: CPTII,S$GLB,, | Performed by: FAMILY MEDICINE

## 2023-05-30 PROCEDURE — 99396 PR PREVENTIVE VISIT,EST,40-64: ICD-10-PCS | Mod: S$GLB,,, | Performed by: FAMILY MEDICINE

## 2023-05-30 PROCEDURE — 3078F PR MOST RECENT DIASTOLIC BLOOD PRESSURE < 80 MM HG: ICD-10-PCS | Mod: CPTII,S$GLB,, | Performed by: FAMILY MEDICINE

## 2023-05-30 PROCEDURE — 3078F DIAST BP <80 MM HG: CPT | Mod: CPTII,S$GLB,, | Performed by: FAMILY MEDICINE

## 2023-05-30 PROCEDURE — 3074F SYST BP LT 130 MM HG: CPT | Mod: CPTII,S$GLB,, | Performed by: FAMILY MEDICINE

## 2023-05-30 PROCEDURE — 3008F PR BODY MASS INDEX (BMI) DOCUMENTED: ICD-10-PCS | Mod: CPTII,S$GLB,, | Performed by: FAMILY MEDICINE

## 2023-05-30 PROCEDURE — 3008F BODY MASS INDEX DOCD: CPT | Mod: CPTII,S$GLB,, | Performed by: FAMILY MEDICINE

## 2023-05-30 PROCEDURE — 99999 PR PBB SHADOW E&M-EST. PATIENT-LVL IV: ICD-10-PCS | Mod: PBBFAC,,, | Performed by: FAMILY MEDICINE

## 2023-05-30 PROCEDURE — 99999 PR PBB SHADOW E&M-EST. PATIENT-LVL IV: CPT | Mod: PBBFAC,,, | Performed by: FAMILY MEDICINE

## 2023-05-30 PROCEDURE — 3074F PR MOST RECENT SYSTOLIC BLOOD PRESSURE < 130 MM HG: ICD-10-PCS | Mod: CPTII,S$GLB,, | Performed by: FAMILY MEDICINE

## 2023-05-30 PROCEDURE — 1159F MED LIST DOCD IN RCRD: CPT | Mod: CPTII,S$GLB,, | Performed by: FAMILY MEDICINE

## 2023-05-30 RX ORDER — DEXTROAMPHETAMINE SACCHARATE, AMPHETAMINE ASPARTATE, DEXTROAMPHETAMINE SULFATE AND AMPHETAMINE SULFATE 2.5; 2.5; 2.5; 2.5 MG/1; MG/1; MG/1; MG/1
TABLET ORAL
Qty: 60 TABLET | Refills: 0 | Status: SHIPPED | OUTPATIENT
Start: 2023-05-30 | End: 2023-08-30 | Stop reason: ALTCHOICE

## 2023-05-30 RX ORDER — DEXTROAMPHETAMINE SACCHARATE, AMPHETAMINE ASPARTATE, DEXTROAMPHETAMINE SULFATE AND AMPHETAMINE SULFATE 2.5; 2.5; 2.5; 2.5 MG/1; MG/1; MG/1; MG/1
TABLET ORAL
Qty: 60 TABLET | Refills: 0 | Status: SHIPPED | OUTPATIENT
Start: 2023-06-29 | End: 2023-08-30 | Stop reason: SDUPTHER

## 2023-05-30 RX ORDER — DEXTROAMPHETAMINE SACCHARATE, AMPHETAMINE ASPARTATE, DEXTROAMPHETAMINE SULFATE AND AMPHETAMINE SULFATE 2.5; 2.5; 2.5; 2.5 MG/1; MG/1; MG/1; MG/1
TABLET ORAL
Qty: 60 TABLET | Refills: 0 | Status: SHIPPED | OUTPATIENT
Start: 2023-07-28 | End: 2023-08-30 | Stop reason: ALTCHOICE

## 2023-05-30 NOTE — PROGRESS NOTES
Chief Complaint   Patient presents with    Medication Management       SUBJECTIVE:   Garth Tejeda is a 42 y.o. male presenting for his annual checkup.   Current Outpatient Medications   Medication Sig Dispense Refill    albuterol (PROVENTIL/VENTOLIN HFA) 90 mcg/actuation inhaler Inhale 2 puffs into the lungs every 6 (six) hours as needed for Wheezing. 18 g 11    aspirin (ECOTRIN) 81 MG EC tablet Take 81 mg by mouth once daily.      atorvastatin (LIPITOR) 10 MG tablet Take 1 tablet (10 mg total) by mouth once daily. 90 tablet 3    desloratadine (CLARINEX) 5 mg tablet Take 1 tablet (5 mg total) by mouth once daily. 30 tablet 11    dextroamphetamine-amphetamine 10 mg Tab TAKE ONE TABLET BY MOUTH TWICE DAILY 60 tablet 0    dextroamphetamine-amphetamine 10 mg Tab TAKE ONE TABLET BY MOUTH TWICE DAILY 60 tablet 0    diazePAM (VALIUM) 10 MG Tab Take 1 tablet (10 mg total) by mouth 2 (two) times daily. 60 tablet 2    diltiaZEM (TIAZAC) 240 MG Cs24 Take 240 mg by mouth.      hydroCHLOROthiazide (HYDRODIURIL) 25 MG tablet Take 25 mg by mouth.      lisinopriL 10 MG tablet Take 1 tablet (10 mg total) by mouth 2 (two) times daily. 180 tablet 3    tirzepatide 10 mg/0.5 mL PnIj Inject 10 mg into the skin every 7 days. 4 pen 11    dextroamphetamine-amphetamine 10 mg Tab TAKE ONE TABLET BY MOUTH TWICE DAILY 60 tablet 0    [START ON 2023] dextroamphetamine-amphetamine 10 mg Tab TAKE ONE TABLET BY MOUTH TWICE DAILY 60 tablet 0    [START ON 2023] dextroamphetamine-amphetamine 10 mg Tab TAKE ONE TABLET BY MOUTH TWICE DAILY 60 tablet 0    fluticasone propionate (FLONASE) 50 mcg/actuation nasal spray SMARTSI Spray(s) Both Nares Every Evening      tirzepatide 12.5 mg/0.5 mL PnIj Inject 12.5 mg into the skin every 7 days. 4 pen 11     No current facility-administered medications for this visit.     Allergies: Patient has no known allergies.   Patient Active Problem List    Diagnosis Date Noted    Prediabetes 09/15/2022     "Severe obesity (BMI 35.0-39.9) with comorbidity 10/26/2021    Cardiomyopathy, unspecified type 10/26/2021    VALENTÍN (obstructive sleep apnea) 02/19/2021    Bacterial sinusitis 01/05/2021    Body water dehydration 09/23/2019    Abnormal ECG 02/01/2018    Mild obesity 11/08/2017    Anxiety about health 02/22/2017    ADD (attention deficit disorder) 02/22/2017    Hypertension, essential 02/22/2017    Left subclavian artery occlusion 03/28/2016    History of congenital heart defect 12/31/2015    Diminished pulse 12/31/2015       ROS:  Feeling well. No dyspnea or chest pain on exertion. No abdominal pain, change in bowel habits, black or bloody stools. No urinary tract or prostatic symptoms. No neurological complaints.    OBJECTIVE:   The patient appears well, alert, oriented x 3, in no distress.   /70   Pulse 79   Temp 98.1 °F (36.7 °C) (Oral)   Ht 5' 7" (1.702 m)   Wt 127 kg (279 lb 15.8 oz)   SpO2 97%   BMI 43.85 kg/m²   Wt Readings from Last 5 Encounters:   05/30/23 127 kg (279 lb 15.8 oz)   03/08/23 127 kg (279 lb 15.8 oz)   11/15/22 128.2 kg (282 lb 10.1 oz)   10/03/22 126.9 kg (279 lb 10.5 oz)   04/07/22 123.3 kg (271 lb 13.2 oz)       ENT normal.  Neck supple. No adenopathy or thyromegaly. DEANNA. Lungs are clear, good air entry, no wheezes, rhonchi or rales. S1 and S2 normal, no murmurs, regular rate and rhythm. Abdomen is soft without tenderness, guarding, mass or organomegaly.  exam: deferred.  Extremities show no edema, normal peripheral pulses. Neurological is normal without focal findings.    ASSESSMENT:   1. Annual physical exam    2. Attention deficit disorder, unspecified hyperactivity presence    3. Severe obesity    4. Prediabetes          PLAN:   Counseled on age appropriate medical preventative services, including age appropriate cancer screenings, over all nutritional health, need for a consistent exercise regimen and an over all push towards maintaining a vigorous and active lifestyle.  " Counseled on age appropriate vaccines and discussed upcoming health care needs based on age/gender.  Spent time with patient counseling on need for a good patient/doctor relationship moving forward.  Discussed use of common OTC medications and supplements.  Discussed common dietary aids and use of caffeine and the need for good sleep hygiene and stress management.    Problem List Items Addressed This Visit       ADD (attention deficit disorder)    Relevant Medications    dextroamphetamine-amphetamine 10 mg Tab    dextroamphetamine-amphetamine 10 mg Tab (Start on 6/29/2023)    dextroamphetamine-amphetamine 10 mg Tab (Start on 7/28/2023)    Prediabetes    Relevant Medications    tirzepatide 12.5 mg/0.5 mL PnIj     Other Visit Diagnoses       Annual physical exam    -  Primary    Severe obesity        Relevant Medications    tirzepatide 12.5 mg/0.5 mL PnIj

## 2023-05-31 ENCOUNTER — PATIENT MESSAGE (OUTPATIENT)
Dept: FAMILY MEDICINE | Facility: CLINIC | Age: 43
End: 2023-05-31
Payer: COMMERCIAL

## 2023-07-19 ENCOUNTER — PATIENT MESSAGE (OUTPATIENT)
Dept: FAMILY MEDICINE | Facility: CLINIC | Age: 43
End: 2023-07-19
Payer: COMMERCIAL

## 2023-08-30 ENCOUNTER — OFFICE VISIT (OUTPATIENT)
Dept: FAMILY MEDICINE | Facility: CLINIC | Age: 43
End: 2023-08-30
Payer: COMMERCIAL

## 2023-08-30 VITALS
HEART RATE: 95 BPM | WEIGHT: 250.88 LBS | BODY MASS INDEX: 38.02 KG/M2 | DIASTOLIC BLOOD PRESSURE: 80 MMHG | OXYGEN SATURATION: 97 % | SYSTOLIC BLOOD PRESSURE: 120 MMHG | TEMPERATURE: 98 F | HEIGHT: 68 IN

## 2023-08-30 DIAGNOSIS — F98.8 ATTENTION DEFICIT DISORDER, UNSPECIFIED HYPERACTIVITY PRESENCE: Primary | ICD-10-CM

## 2023-08-30 DIAGNOSIS — I42.9 CARDIOMYOPATHY, UNSPECIFIED TYPE: ICD-10-CM

## 2023-08-30 PROCEDURE — 4010F PR ACE/ARB THEARPY RXD/TAKEN: ICD-10-PCS | Mod: CPTII,S$GLB,, | Performed by: FAMILY MEDICINE

## 2023-08-30 PROCEDURE — 3079F DIAST BP 80-89 MM HG: CPT | Mod: CPTII,S$GLB,, | Performed by: FAMILY MEDICINE

## 2023-08-30 PROCEDURE — 1159F PR MEDICATION LIST DOCUMENTED IN MEDICAL RECORD: ICD-10-PCS | Mod: CPTII,S$GLB,, | Performed by: FAMILY MEDICINE

## 2023-08-30 PROCEDURE — 3008F BODY MASS INDEX DOCD: CPT | Mod: CPTII,S$GLB,, | Performed by: FAMILY MEDICINE

## 2023-08-30 PROCEDURE — 4010F ACE/ARB THERAPY RXD/TAKEN: CPT | Mod: CPTII,S$GLB,, | Performed by: FAMILY MEDICINE

## 2023-08-30 PROCEDURE — 3074F SYST BP LT 130 MM HG: CPT | Mod: CPTII,S$GLB,, | Performed by: FAMILY MEDICINE

## 2023-08-30 PROCEDURE — 1159F MED LIST DOCD IN RCRD: CPT | Mod: CPTII,S$GLB,, | Performed by: FAMILY MEDICINE

## 2023-08-30 PROCEDURE — 99999 PR PBB SHADOW E&M-EST. PATIENT-LVL III: ICD-10-PCS | Mod: PBBFAC,,, | Performed by: FAMILY MEDICINE

## 2023-08-30 PROCEDURE — 3074F PR MOST RECENT SYSTOLIC BLOOD PRESSURE < 130 MM HG: ICD-10-PCS | Mod: CPTII,S$GLB,, | Performed by: FAMILY MEDICINE

## 2023-08-30 PROCEDURE — 99214 PR OFFICE/OUTPT VISIT, EST, LEVL IV, 30-39 MIN: ICD-10-PCS | Mod: S$GLB,,, | Performed by: FAMILY MEDICINE

## 2023-08-30 PROCEDURE — 3079F PR MOST RECENT DIASTOLIC BLOOD PRESSURE 80-89 MM HG: ICD-10-PCS | Mod: CPTII,S$GLB,, | Performed by: FAMILY MEDICINE

## 2023-08-30 PROCEDURE — 3008F PR BODY MASS INDEX (BMI) DOCUMENTED: ICD-10-PCS | Mod: CPTII,S$GLB,, | Performed by: FAMILY MEDICINE

## 2023-08-30 PROCEDURE — 99214 OFFICE O/P EST MOD 30 MIN: CPT | Mod: S$GLB,,, | Performed by: FAMILY MEDICINE

## 2023-08-30 PROCEDURE — 99999 PR PBB SHADOW E&M-EST. PATIENT-LVL III: CPT | Mod: PBBFAC,,, | Performed by: FAMILY MEDICINE

## 2023-08-30 RX ORDER — OSELTAMIVIR PHOSPHATE 75 MG/1
75 CAPSULE ORAL 2 TIMES DAILY
Qty: 10 CAPSULE | Refills: 0 | Status: SHIPPED | OUTPATIENT
Start: 2023-08-30 | End: 2023-09-04

## 2023-08-30 RX ORDER — DEXTROAMPHETAMINE SACCHARATE, AMPHETAMINE ASPARTATE, DEXTROAMPHETAMINE SULFATE AND AMPHETAMINE SULFATE 2.5; 2.5; 2.5; 2.5 MG/1; MG/1; MG/1; MG/1
TABLET ORAL
Qty: 60 TABLET | Refills: 0 | Status: SHIPPED | OUTPATIENT
Start: 2023-09-29 | End: 2023-08-30 | Stop reason: ALTCHOICE

## 2023-08-30 RX ORDER — DEXTROAMPHETAMINE SACCHARATE, AMPHETAMINE ASPARTATE, DEXTROAMPHETAMINE SULFATE AND AMPHETAMINE SULFATE 2.5; 2.5; 2.5; 2.5 MG/1; MG/1; MG/1; MG/1
TABLET ORAL
Qty: 60 TABLET | Refills: 0 | Status: SHIPPED | OUTPATIENT
Start: 2023-08-30 | End: 2023-08-30 | Stop reason: ALTCHOICE

## 2023-08-30 RX ORDER — DEXTROAMPHETAMINE SACCHARATE, AMPHETAMINE ASPARTATE, DEXTROAMPHETAMINE SULFATE AND AMPHETAMINE SULFATE 2.5; 2.5; 2.5; 2.5 MG/1; MG/1; MG/1; MG/1
TABLET ORAL
Qty: 60 TABLET | Refills: 0 | Status: SHIPPED | OUTPATIENT
Start: 2023-10-28 | End: 2023-08-30 | Stop reason: ALTCHOICE

## 2023-08-30 NOTE — PROGRESS NOTES
"Chief Complaint   Patient presents with    Follow-up       SUBJECTIVE:  Garth Tejeda is a 42 y.o. male here for follow up of ADHD.   Patient has ADHD and is well controleld without drug side effects and doing well overall without any unusual symptoms or history.             Currently has co-morbidities including below problem list.        Patient Active Problem List    Diagnosis Date Noted    Prediabetes 09/15/2022    Severe obesity (BMI 35.0-39.9) with comorbidity 10/26/2021    Cardiomyopathy, unspecified type 10/26/2021    VALENTÍN (obstructive sleep apnea) 02/19/2021     Patient with symptoms of  Daytime sleepiness ESS 8, long irritated uvula, snoring and fatigue .  PSG 2/1/2021:    The overall AHI was 7.6 with an oxygen tracey of 85.0%. The AHI in REM sleep was 24.9. The central apnea index was 0.2. The supine AHI was 7.2.   Patient is using and benefiting from cpap 4-11 cm H2O. Residual predicted AHI optimal.       Bacterial sinusitis 01/05/2021    Body water dehydration 09/23/2019    Abnormal ECG 02/01/2018     Formatting of this note might be different from the original.  Echocardiogram shows upper normal LV size, normal LV thickness, aortic root borderline increased size, left atrium probably normal in size based on KATARZYNA of 16, EF 50%, diastolic function normal, mild TR.      Mild obesity 11/08/2017    Anxiety about health 02/22/2017     Long standing  Seen by psych, no admissions  effexor was weight gain  Ativan PRN      ADD (attention deficit disorder) 02/22/2017    Hypertension, essential 02/22/2017    Left subclavian artery occlusion 03/28/2016     Secondary to muscle impingement, has collateral flow now, just monitoring      History of congenital heart defect 12/31/2015    Diminished pulse 12/31/2015     LEFT UPPER EXTREMITY         ROS    OBJECTIVE:  /80   Pulse 95   Temp 98.3 °F (36.8 °C) (Oral)   Ht 5' 8" (1.727 m)   Wt 113.8 kg (250 lb 14.1 oz)   SpO2 97%   BMI 38.15 kg/m²     Wt Readings " from Last 3 Encounters:   08/30/23 113.8 kg (250 lb 14.1 oz)   05/30/23 127 kg (279 lb 15.8 oz)   03/08/23 127 kg (279 lb 15.8 oz)     BP Readings from Last 3 Encounters:   08/30/23 120/80   05/30/23 118/70   03/08/23 110/70       Patient is in usual state of health, alert and oriented without signs of intoxication.  No evidence on exam of illegal substance use or concerning symptoms involving abuse or intoxication (specifically evidence of IVDU, unusual bruising, slurred speech, unusual explanations).    Doing well on mounjaro  Jlower dose no weight loss but it is keepign him stable           Review of old Records:  Reviewed per epic and   NARx OD score/stimulant score: reviewed  Review of old labs:    Lab Results   Component Value Date    TSH 3.658 11/03/2021     Lab Results   Component Value Date    WBC 5.86 04/21/2022    HGB 14.3 04/21/2022    HCT 44.7 04/21/2022    MCV 88 04/21/2022     04/21/2022       Chemistry        Component Value Date/Time     11/03/2021 0743    K 3.9 11/03/2021 0743     11/03/2021 0743    CO2 24 11/03/2021 0743    BUN 10 11/03/2021 0743    CREATININE 0.9 11/03/2021 0743     11/03/2021 0743        Component Value Date/Time    CALCIUM 9.1 11/03/2021 0743    ALKPHOS 79 11/03/2021 0743    AST 17 11/03/2021 0743    ALT 25 11/03/2021 0743    BILITOT 0.3 11/03/2021 0743    ESTGFRAFRICA >60 11/03/2021 0743    EGFRNONAA >60 11/03/2021 0743        Lab Results   Component Value Date    CHOL 123 11/03/2021    CHOL 159 08/15/2018    CHOL 138 11/17/2017     Lab Results   Component Value Date    HDL 42 11/03/2021    HDL 46 08/15/2018    HDL 46 11/17/2017     Lab Results   Component Value Date    LDLCALC 61.4 (L) 11/03/2021    LDLCALC 86.4 08/15/2018    LDLCALC 73.0 11/17/2017     Lab Results   Component Value Date    TRIG 98 11/03/2021    TRIG 133 08/15/2018    TRIG 95 11/17/2017     Lab Results   Component Value Date    CHOLHDL 34.1 11/03/2021    CHOLHDL 28.9 08/15/2018     CHOLHDL 33.3 2017         Review of old imaging:  Reviewed last EKG tracing if available.          ASSESSMENT:    ICD-10-CM ICD-9-CM   1. Attention deficit disorder, unspecified hyperactivity presence  F98.8 314.00   2. Cardiomyopathy, unspecified type  I42.9 425.4       No evidence of abuse/diversion/addiction noted through history and physical, or checking the .  Risks, benefits and alternate treatments are considered and plan of care reflects that shared decision with the patient.  Went over schedule II responsibilities, including abuse, side effects, refill restrictions, necessary visits, drug testing, protection of medication.  Patient voices understanding.      PLAN:    Problem List Items Addressed This Visit       ADD (attention deficit disorder) - Primary    Cardiomyopathy, unspecified type    Current Assessment & Plan     Stable and doing               Medication List with Changes/Refills   New Medications    OSELTAMIVIR (TAMIFLU) 75 MG CAPSULE    Take 1 capsule (75 mg total) by mouth 2 (two) times daily. for 5 days   Current Medications    ALBUTEROL (PROVENTIL/VENTOLIN HFA) 90 MCG/ACTUATION INHALER    Inhale 2 puffs into the lungs every 6 (six) hours as needed for Wheezing.    ASPIRIN (ECOTRIN) 81 MG EC TABLET    Take 81 mg by mouth once daily.    ATORVASTATIN (LIPITOR) 10 MG TABLET    Take 1 tablet (10 mg total) by mouth once daily.    DESLORATADINE (CLARINEX) 5 MG TABLET    Take 1 tablet (5 mg total) by mouth once daily.    DIAZEPAM (VALIUM) 10 MG TAB    Take 1 tablet (10 mg total) by mouth 2 (two) times daily.    DILTIAZEM (TIAZAC) 240 MG CS24    Take 240 mg by mouth.    FLUTICASONE PROPIONATE (FLONASE) 50 MCG/ACTUATION NASAL SPRAY    SMARTSI Spray(s) Both Nares Every Evening    HYDROCHLOROTHIAZIDE (HYDRODIURIL) 25 MG TABLET    Take 25 mg by mouth.    LISINOPRIL 10 MG TABLET    Take 1 tablet (10 mg total) by mouth 2 (two) times daily.    TIRZEPATIDE 10 MG/0.5 ML PNIJ    Inject 10 mg into the  skin every 7 days.    TIRZEPATIDE 12.5 MG/0.5 ML PNIJ    Inject 12.5 mg into the skin every 7 days.   Discontinued Medications    DEXTROAMPHETAMINE-AMPHETAMINE 10 MG TAB    TAKE ONE TABLET BY MOUTH TWICE DAILY    DEXTROAMPHETAMINE-AMPHETAMINE 10 MG TAB    TAKE ONE TABLET BY MOUTH TWICE DAILY    DEXTROAMPHETAMINE-AMPHETAMINE 10 MG TAB    TAKE ONE TABLET BY MOUTH TWICE DAILY    DEXTROAMPHETAMINE-AMPHETAMINE 10 MG TAB    TAKE ONE TABLET BY MOUTH TWICE DAILY    DEXTROAMPHETAMINE-AMPHETAMINE 10 MG TAB    TAKE ONE TABLET BY MOUTH TWICE DAILY         Continue with current care unless changes noted below.  Increase to 12.5 mg weekly of tirzepatide  High risk medication review completed per guidelines to insure safest use of medication.      We reviewed our goals of care:    Improvement in concentration and mood  Aid focus in completing tasks at work/school  Safety first priority      And discontinuation      Intolerable side effects  Evidence of abuse/misuse or diversion      Future Appointments   Date Time Provider Department Center   11/28/2023  4:00 PM [facility]       3 months or sooner as needed

## 2023-10-12 ENCOUNTER — E-VISIT (OUTPATIENT)
Dept: FAMILY MEDICINE | Facility: CLINIC | Age: 43
End: 2023-10-12
Payer: COMMERCIAL

## 2023-10-12 ENCOUNTER — PATIENT MESSAGE (OUTPATIENT)
Dept: FAMILY MEDICINE | Facility: CLINIC | Age: 43
End: 2023-10-12

## 2023-10-12 DIAGNOSIS — K52.9 AGE (ACUTE GASTROENTERITIS): Primary | ICD-10-CM

## 2023-10-12 PROCEDURE — 99423 PR E&M, ONLINE DIGIT, EST, < 7 DAYS,  21+ MINS: ICD-10-PCS | Mod: ,,, | Performed by: FAMILY MEDICINE

## 2023-10-12 PROCEDURE — 99423 OL DIG E/M SVC 21+ MIN: CPT | Mod: ,,, | Performed by: FAMILY MEDICINE

## 2023-10-12 RX ORDER — METOCLOPRAMIDE 10 MG/1
10 TABLET ORAL
Qty: 20 TABLET | Refills: 0 | Status: SHIPPED | OUTPATIENT
Start: 2023-10-12 | End: 2023-10-17

## 2023-10-12 RX ORDER — ONDANSETRON 8 MG/1
8 TABLET, ORALLY DISINTEGRATING ORAL 2 TIMES DAILY
Qty: 20 TABLET | Refills: 1 | Status: SHIPPED | OUTPATIENT
Start: 2023-10-12

## 2023-10-12 NOTE — PROGRESS NOTES
Patient ID: Garth Tejeda is a 43 y.o. male.    Chief Complaint: GI Problem (Entered automatically based on patient selection in Patient Portal.)    The patient initiated a request through [employer] on 10/12/2023 for evaluation and management with a chief complaint of GI Problem (Entered automatically based on patient selection in Patient Portal.)     I evaluated the questionnaire submission on 10/12/2023.    Ohs Peq Evisit Diarrhea    10/12/2023  1:00 PM CDT - Filed by Patient   Do you agree to participate in an E-Visit? Yes   If you have any of the following symptoms, please present to your local ER or call 911:  I acknowledge   What is the main issue that you would like for your doctor to address today? Bloating went from having diarrhea to constipation, and still throwing up   Are you able to take your vital signs? No   Do you have diarrhea? Yes   How many stools have you passed in the last 24 hours? Five to eight   Is there blood in your stool, or is your stool dark red or black? None of the above   Does your stool contain pus or mucus? No   Have you taken a laxative or a medicine to help you move your bowels lately? No   Are you vomiting? Yes, I am vomiting occasionally   Are you able to keep down fluids? No, everything comes right back up   Do you have belly pain? I have pain in the upper part of my belly;  I have pain in the lower part of my belly   Are you feeling dizzy or like you might pass out? No   When did your symptoms begin? 10/10/2023   Do you have a fever? Yes, I have a high fever (101 degrees or more)   Are you having trouble walking or lifting yourself due to weakness from this illness?  No   Do any of the following apply to you? I am not passing much urine   Did your condition begin after a specific meal that may have caused the illness? Yes, after two to six hours   Please enter some information about your meal, including what you ate that may have caused your illness. I ate red beans and rice  for lunch by evening time I was sick    Have you taken antibiotics recently? I have not been on any antibiotics   Have you been hospitalized in the past 2 months? No, I have not been hospitalized recently.   Do you work in a  center or healthcare environment? No   Does anyone you know have similar symptoms? No   Have you had a meal consisting of raw meat or fish in the week prior to your illness? No   Have you recently travelled to a place where you may have caught an illness? No   Have you tried any medication or other treatment for your symptoms? Yes   Please list the treatments you tried, and the result of those treatments. Took a Shira Imodium and Gas-X   Provide any information you feel is important to your history not asked above Still throwing up as of an hour  ago diaharea  stopped yesterday now feeling severly bloated like i need to go to bathroomvomit is green in color   Please attach any relevant images or files          Recent Labs Obtained:  No visits with results within 7 Day(s) from this visit.   Latest known visit with results is:   Lab Visit on 04/21/2022   Component Date Value Ref Range Status    WBC 04/21/2022 5.86  3.90 - 12.70 K/uL Final    RBC 04/21/2022 5.07  4.60 - 6.20 M/uL Final    Hemoglobin 04/21/2022 14.3  14.0 - 18.0 g/dL Final    Hematocrit 04/21/2022 44.7  40.0 - 54.0 % Final    MCV 04/21/2022 88  82 - 98 fL Final    MCH 04/21/2022 28.2  27.0 - 31.0 pg Final    MCHC 04/21/2022 32.0  32.0 - 36.0 g/dL Final    RDW 04/21/2022 14.2  11.5 - 14.5 % Final    Platelets 04/21/2022 234  150 - 450 K/uL Final    MPV 04/21/2022 10.0  9.2 - 12.9 fL Final    Immature Granulocytes 04/21/2022 0.3  0.0 - 0.5 % Final    Gran # (ANC) 04/21/2022 3.8  1.8 - 7.7 K/uL Final    Immature Grans (Abs) 04/21/2022 0.02  0.00 - 0.04 K/uL Final    Comment: Mild elevation in immature granulocytes is non specific and   can be seen in a variety of conditions including stress response,   acute inflammation,  trauma and pregnancy. Correlation with other   laboratory and clinical findings is essential.      Lymph # 04/21/2022 1.4  1.0 - 4.8 K/uL Final    Mono # 04/21/2022 0.5  0.3 - 1.0 K/uL Final    Eos # 04/21/2022 0.1  0.0 - 0.5 K/uL Final    Baso # 04/21/2022 0.03  0.00 - 0.20 K/uL Final    nRBC 04/21/2022 0  0 /100 WBC Final    Gran % 04/21/2022 64.5  38.0 - 73.0 % Final    Lymph % 04/21/2022 24.1  18.0 - 48.0 % Final    Mono % 04/21/2022 9.2  4.0 - 15.0 % Final    Eosinophil % 04/21/2022 1.4  0.0 - 8.0 % Final    Basophil % 04/21/2022 0.5  0.0 - 1.9 % Final    Differential Method 04/21/2022 Automated   Final    D-Dimer 04/21/2022 0.40  <0.50 mg/L FEU Final    Comment: The quantitative D-dimer assay should be used as an aid in   the diagnosis of deep vein thrombosis and pulmonary embolism  in patients with the appropriate presentation and clinical  history. The upper limit of the reference interval and the clinical   cut off   point are identical. Causes of a positive (>0.50 mg/L FEU) D-Dimer   test  include, but are not limited to: DVT, PE, DIC, thrombolytic   therapy, anticoagulant therapy, recent surgery, trauma, or   pregnancy, disseminated malignancy, aortic aneurysm, cirrhosis,  and severe infection. False negative results may occur in   patients with distal DVT.      Sed Rate 04/21/2022 2  0 - 10 mm/Hr Final    CRP 04/21/2022 14.7 (H)  0.0 - 8.2 mg/L Final       Encounter Diagnosis   Name Primary?    AGE (acute gastroenteritis) Yes        No orders of the defined types were placed in this encounter.     Medications Ordered This Encounter   Medications    metoclopramide HCl (REGLAN) 10 MG tablet     Sig: Take 1 tablet (10 mg total) by mouth 4 (four) times daily before meals and nightly. for 5 days     Dispense:  20 tablet     Refill:  0    ondansetron (ZOFRAN-ODT) 8 MG TbDL     Sig: Take 1 tablet (8 mg total) by mouth 2 (two) times daily.     Dispense:  20 tablet     Refill:  1        No follow-ups on  file.      E-Visit Time Tracking:    Day 1 Time (in minutes): 22     Total Time (in minutes): 22

## 2023-11-28 ENCOUNTER — OFFICE VISIT (OUTPATIENT)
Dept: FAMILY MEDICINE | Facility: CLINIC | Age: 43
End: 2023-11-28
Payer: COMMERCIAL

## 2023-11-28 VITALS — HEIGHT: 70 IN | BODY MASS INDEX: 35.66 KG/M2 | WEIGHT: 249.13 LBS

## 2023-11-28 DIAGNOSIS — Z00.00 ANNUAL PHYSICAL EXAM: Primary | ICD-10-CM

## 2023-11-28 PROCEDURE — 99999 PR PBB SHADOW E&M-EST. PATIENT-LVL II: CPT | Mod: PBBFAC,,, | Performed by: FAMILY MEDICINE

## 2023-11-28 PROCEDURE — 99214 PR OFFICE/OUTPT VISIT, EST, LEVL IV, 30-39 MIN: ICD-10-PCS | Mod: S$GLB,,, | Performed by: FAMILY MEDICINE

## 2023-11-28 PROCEDURE — 4010F PR ACE/ARB THEARPY RXD/TAKEN: ICD-10-PCS | Mod: CPTII,S$GLB,, | Performed by: FAMILY MEDICINE

## 2023-11-28 PROCEDURE — 3008F PR BODY MASS INDEX (BMI) DOCUMENTED: ICD-10-PCS | Mod: CPTII,S$GLB,, | Performed by: FAMILY MEDICINE

## 2023-11-28 PROCEDURE — 99214 OFFICE O/P EST MOD 30 MIN: CPT | Mod: S$GLB,,, | Performed by: FAMILY MEDICINE

## 2023-11-28 PROCEDURE — 4010F ACE/ARB THERAPY RXD/TAKEN: CPT | Mod: CPTII,S$GLB,, | Performed by: FAMILY MEDICINE

## 2023-11-28 PROCEDURE — 3008F BODY MASS INDEX DOCD: CPT | Mod: CPTII,S$GLB,, | Performed by: FAMILY MEDICINE

## 2023-11-28 PROCEDURE — 99999 PR PBB SHADOW E&M-EST. PATIENT-LVL II: ICD-10-PCS | Mod: PBBFAC,,, | Performed by: FAMILY MEDICINE

## 2023-11-28 RX ORDER — DEXTROAMPHETAMINE SACCHARATE, AMPHETAMINE ASPARTATE, DEXTROAMPHETAMINE SULFATE AND AMPHETAMINE SULFATE 2.5; 2.5; 2.5; 2.5 MG/1; MG/1; MG/1; MG/1
1 TABLET ORAL 2 TIMES DAILY
COMMUNITY
End: 2023-11-28 | Stop reason: SDUPTHER

## 2023-11-28 RX ORDER — DEXTROAMPHETAMINE SACCHARATE, AMPHETAMINE ASPARTATE, DEXTROAMPHETAMINE SULFATE AND AMPHETAMINE SULFATE 2.5; 2.5; 2.5; 2.5 MG/1; MG/1; MG/1; MG/1
1 TABLET ORAL 2 TIMES DAILY
Qty: 60 TABLET | Refills: 0 | Status: SHIPPED | OUTPATIENT
Start: 2023-11-28

## 2023-11-28 NOTE — PROGRESS NOTES
"Chief Complaint   Patient presents with    Medication Refill       SUBJECTIVE:  Garth Tejeda is a 43 y.o. male here for follow up of ADHD.   Patient has ADHD and is well controleld without drug side effects and doing well overall without any unusual symptoms or history.             Currently has co-morbidities including below problem list.        Patient Active Problem List    Diagnosis Date Noted    Prediabetes 09/15/2022    Severe obesity (BMI 35.0-39.9) with comorbidity 10/26/2021    Cardiomyopathy, unspecified type 10/26/2021    VALENTÍN (obstructive sleep apnea) 02/19/2021     Patient with symptoms of  Daytime sleepiness ESS 8, long irritated uvula, snoring and fatigue .  PSG 2/1/2021:    The overall AHI was 7.6 with an oxygen tracey of 85.0%. The AHI in REM sleep was 24.9. The central apnea index was 0.2. The supine AHI was 7.2.   Patient is using and benefiting from cpap 4-11 cm H2O. Residual predicted AHI optimal.       Bacterial sinusitis 01/05/2021    Body water dehydration 09/23/2019    Abnormal ECG 02/01/2018     Formatting of this note might be different from the original.  Echocardiogram shows upper normal LV size, normal LV thickness, aortic root borderline increased size, left atrium probably normal in size based on KATARZYNA of 16, EF 50%, diastolic function normal, mild TR.      Mild obesity 11/08/2017    Anxiety about health 02/22/2017     Long standing  Seen by psych, no admissions  effexor was weight gain  Ativan PRN      ADD (attention deficit disorder) 02/22/2017    Hypertension, essential 02/22/2017    Left subclavian artery occlusion 03/28/2016     Secondary to muscle impingement, has collateral flow now, just monitoring      History of congenital heart defect 12/31/2015    Diminished pulse 12/31/2015     LEFT UPPER EXTREMITY         ROS  Per HPI    OBJECTIVE:  Ht 5' 10" (1.778 m)   Wt 113 kg (249 lb 1.9 oz)   BMI 35.74 kg/m²     Wt Readings from Last 3 Encounters:   11/28/23 113 kg (249 lb 1.9 " oz)   08/30/23 113.8 kg (250 lb 14.1 oz)   05/30/23 127 kg (279 lb 15.8 oz)     BP Readings from Last 3 Encounters:   08/30/23 120/80   05/30/23 118/70   03/08/23 110/70       Patient is in usual state of health, alert and oriented without signs of intoxication.  No evidence on exam of illegal substance use or concerning symptoms involving abuse or intoxication (specifically evidence of IVDU, unusual bruising, slurred speech, unusual explanations).               Review of old Records:  Reviewed per epic and   NARx OD score/stimulant score: reviewed  Review of old labs:    Lab Results   Component Value Date    TSH 3.658 11/03/2021     Lab Results   Component Value Date    WBC 5.86 04/21/2022    HGB 14.3 04/21/2022    HCT 44.7 04/21/2022    MCV 88 04/21/2022     04/21/2022       Chemistry        Component Value Date/Time     11/03/2021 0743    K 3.9 11/03/2021 0743     11/03/2021 0743    CO2 24 11/03/2021 0743    BUN 10 11/03/2021 0743    CREATININE 0.9 11/03/2021 0743     11/03/2021 0743        Component Value Date/Time    CALCIUM 9.1 11/03/2021 0743    ALKPHOS 79 11/03/2021 0743    AST 17 11/03/2021 0743    ALT 25 11/03/2021 0743    BILITOT 0.3 11/03/2021 0743    ESTGFRAFRICA >60 11/03/2021 0743    EGFRNONAA >60 11/03/2021 0743        Lab Results   Component Value Date    CHOL 123 11/03/2021    CHOL 159 08/15/2018    CHOL 138 11/17/2017     Lab Results   Component Value Date    HDL 42 11/03/2021    HDL 46 08/15/2018    HDL 46 11/17/2017     Lab Results   Component Value Date    LDLCALC 61.4 (L) 11/03/2021    LDLCALC 86.4 08/15/2018    LDLCALC 73.0 11/17/2017     Lab Results   Component Value Date    TRIG 98 11/03/2021    TRIG 133 08/15/2018    TRIG 95 11/17/2017     Lab Results   Component Value Date    CHOLHDL 34.1 11/03/2021    CHOLHDL 28.9 08/15/2018    CHOLHDL 33.3 11/17/2017         Review of old imaging:  Reviewed last EKG tracing if available.          ASSESSMENT:  No diagnosis  found.    No evidence of abuse/diversion/addiction noted through history and physical, or checking the .  Risks, benefits and alternate treatments are considered and plan of care reflects that shared decision with the patient.  Went over schedule II responsibilities, including abuse, side effects, refill restrictions, necessary visits, drug testing, protection of medication.  Patient voices understanding.      PLAN:    Problem List Items Addressed This Visit    None        Medication List with Changes/Refills   Current Medications    ALBUTEROL (PROVENTIL/VENTOLIN HFA) 90 MCG/ACTUATION INHALER    Inhale 2 puffs into the lungs every 6 (six) hours as needed for Wheezing.    ASPIRIN (ECOTRIN) 81 MG EC TABLET    Take 81 mg by mouth once daily.    ATORVASTATIN (LIPITOR) 10 MG TABLET    Take 1 tablet (10 mg total) by mouth once daily.    DESLORATADINE (CLARINEX) 5 MG TABLET    Take 1 tablet (5 mg total) by mouth once daily.    DIAZEPAM (VALIUM) 10 MG TAB    Take 1 tablet (10 mg total) by mouth 2 (two) times daily.    DILTIAZEM (TIAZAC) 240 MG CS24    Take 240 mg by mouth.    FLUTICASONE PROPIONATE (FLONASE) 50 MCG/ACTUATION NASAL SPRAY    SMARTSI Spray(s) Both Nares Every Evening    HYDROCHLOROTHIAZIDE (HYDRODIURIL) 25 MG TABLET    Take 25 mg by mouth.    LISINOPRIL 10 MG TABLET    Take 1 tablet (10 mg total) by mouth 2 (two) times daily.    ONDANSETRON (ZOFRAN-ODT) 8 MG TBDL    Take 1 tablet (8 mg total) by mouth 2 (two) times daily.    TIRZEPATIDE 10 MG/0.5 ML PNIJ    Inject 10 mg into the skin every 7 days.    TIRZEPATIDE 12.5 MG/0.5 ML PNIJ    Inject 12.5 mg into the skin every 7 days.         Continue with current care unless changes noted below.    High risk medication review completed per guidelines to insure safest use of medication.      We reviewed our goals of care:    Improvement in concentration and mood  Aid focus in completing tasks at work/school  Safety first priority      And  discontinuation      Intolerable side effects  Evidence of abuse/misuse or diversion      No future appointments.    3 months or sooner as needed

## 2023-12-23 DIAGNOSIS — I70.8 LEFT SUBCLAVIAN ARTERY OCCLUSION: ICD-10-CM

## 2023-12-23 NOTE — TELEPHONE ENCOUNTER
Care Due:                  Date            Visit Type   Department     Provider  --------------------------------------------------------------------------------                                [facility] FAM[facility]                              PRIMARY      MEDICINE/INTERN  Last Visit: 11-      CARE ([facility])   [facility]         Gómez Parisi  Next Visit: None Scheduled  None         None Found                                                            Last  Test          Frequency    Reason                     Performed    Due Date  --------------------------------------------------------------------------------    CMP.........  12 months..  atorvastatin, lisinopriL.  Not Found    Overdue    HBA1C.......  6 months...  tirzepatide..............  Not Found    Overdue    Lipid Panel.  12 months..  atorvastatin.............  Not Found    Overdue    [employer] Embedded Care Due Messages. Reference number: [id number].   12/23/2023 8:07:52 AM CST

## 2023-12-24 NOTE — TELEPHONE ENCOUNTER
Refill Routing Note   Medication(s) are not appropriate for processing by [facility] for the following reason(s):      Required labs outdated    [facility] action(s):  Defer Care Due:  Labs due            Appointments  past 12m or future 3m with PCP    Date Provider   Last Visit   11/28/2023 Gómez Parisi MD   Next Visit   Visit date not found Gómez Parisi MD   ED visits in past 90 days: 0        Note composed:2:23 PM 12/24/2023

## 2023-12-26 RX ORDER — ATORVASTATIN CALCIUM 10 MG/1
10 TABLET, FILM COATED ORAL
Qty: 90 TABLET | Refills: 0 | Status: SHIPPED | OUTPATIENT
Start: 2023-12-26

## 2024-01-06 DIAGNOSIS — F41.9 ANXIETY: ICD-10-CM

## 2024-01-06 NOTE — TELEPHONE ENCOUNTER
No care due was identified.  [facility] Embedded Care Due Messages. Reference number: [id number].   1/06/2024 9:40:39 AM CST

## 2024-01-07 DIAGNOSIS — J30.9 ALLERGIC RHINITIS, UNSPECIFIED SEASONALITY, UNSPECIFIED TRIGGER: ICD-10-CM

## 2024-01-07 DIAGNOSIS — J06.9 UPPER RESPIRATORY TRACT INFECTION, UNSPECIFIED TYPE: ICD-10-CM

## 2024-01-07 NOTE — TELEPHONE ENCOUNTER
No care due was identified.  [facility] Embedded Care Due Messages. Reference number: [id number].   1/07/2024 11:13:21 AM CST

## 2024-01-08 RX ORDER — DIAZEPAM 10 MG/1
10 TABLET ORAL 2 TIMES DAILY
Qty: 60 TABLET | Refills: 2 | Status: SHIPPED | OUTPATIENT
Start: 2024-01-08

## 2024-01-08 RX ORDER — DESLORATADINE 5 MG/1
5 TABLET ORAL
Qty: 90 TABLET | Refills: 3 | Status: SHIPPED | OUTPATIENT
Start: 2024-01-08

## 2024-01-09 NOTE — TELEPHONE ENCOUNTER
Refill Decision Note   Garth Tejeda  is requesting a refill authorization.  Brief Assessment and Rationale for Refill:  Approve     Medication Therapy Plan:         Comments:     Note composed:10:17 PM 01/08/2024

## 2024-02-07 ENCOUNTER — LAB VISIT (OUTPATIENT)
Dept: LAB | Facility: HOSPITAL | Age: 44
End: 2024-02-07
Attending: FAMILY MEDICINE
Payer: COMMERCIAL

## 2024-02-07 DIAGNOSIS — Z00.00 ANNUAL PHYSICAL EXAM: ICD-10-CM

## 2024-02-07 LAB
ALBUMIN SERPL BCP-MCNC: 3.9 G/DL (ref 3.5–5.2)
ALP SERPL-CCNC: 98 U/L (ref 55–135)
ALT SERPL W/O P-5'-P-CCNC: 25 U/L (ref 10–44)
ANION GAP SERPL CALC-SCNC: 12 MMOL/L (ref 8–16)
AST SERPL-CCNC: 16 U/L (ref 10–40)
BASOPHILS # BLD AUTO: 0.03 K/UL (ref 0–0.2)
BASOPHILS NFR BLD: 0.4 % (ref 0–1.9)
BILIRUB SERPL-MCNC: 0.4 MG/DL (ref 0.1–1)
BILIRUB UR QL STRIP: NEGATIVE
BNP SERPL-MCNC: 13 PG/ML (ref 0–99)
BUN SERPL-MCNC: 11 MG/DL (ref 6–20)
CALCIUM SERPL-MCNC: 9.2 MG/DL (ref 8.7–10.5)
CHLORIDE SERPL-SCNC: 104 MMOL/L (ref 95–110)
CHOLEST SERPL-MCNC: 136 MG/DL (ref 120–199)
CHOLEST/HDLC SERPL: 3.1 {RATIO} (ref 2–5)
CLARITY UR: CLEAR
CO2 SERPL-SCNC: 27 MMOL/L (ref 23–29)
COLOR UR: YELLOW
COMPLEXED PSA SERPL-MCNC: 0.71 NG/ML (ref 0–4)
CREAT SERPL-MCNC: 0.9 MG/DL (ref 0.5–1.4)
DIFFERENTIAL METHOD BLD: NORMAL
EOSINOPHIL # BLD AUTO: 0.1 K/UL (ref 0–0.5)
EOSINOPHIL NFR BLD: 0.7 % (ref 0–8)
ERYTHROCYTE [DISTWIDTH] IN BLOOD BY AUTOMATED COUNT: 14.2 % (ref 11.5–14.5)
EST. GFR  (NO RACE VARIABLE): >60 ML/MIN/1.73 M^2
GLUCOSE SERPL-MCNC: 87 MG/DL (ref 70–110)
GLUCOSE UR QL STRIP: NEGATIVE
HCT VFR BLD AUTO: 46.5 % (ref 40–54)
HDLC SERPL-MCNC: 44 MG/DL (ref 40–75)
HDLC SERPL: 32.4 % (ref 20–50)
HGB BLD-MCNC: 15.1 G/DL (ref 14–18)
HGB UR QL STRIP: NEGATIVE
IMM GRANULOCYTES # BLD AUTO: 0.02 K/UL (ref 0–0.04)
IMM GRANULOCYTES NFR BLD AUTO: 0.3 % (ref 0–0.5)
KETONES UR QL STRIP: NEGATIVE
LDLC SERPL CALC-MCNC: 69.4 MG/DL (ref 63–159)
LEUKOCYTE ESTERASE UR QL STRIP: NEGATIVE
LYMPHOCYTES # BLD AUTO: 1.9 K/UL (ref 1–4.8)
LYMPHOCYTES NFR BLD: 25.6 % (ref 18–48)
MCH RBC QN AUTO: 27.9 PG (ref 27–31)
MCHC RBC AUTO-ENTMCNC: 32.5 G/DL (ref 32–36)
MCV RBC AUTO: 86 FL (ref 82–98)
MONOCYTES # BLD AUTO: 0.6 K/UL (ref 0.3–1)
MONOCYTES NFR BLD: 8 % (ref 4–15)
NEUTROPHILS # BLD AUTO: 4.9 K/UL (ref 1.8–7.7)
NEUTROPHILS NFR BLD: 65 % (ref 38–73)
NITRITE UR QL STRIP: NEGATIVE
NONHDLC SERPL-MCNC: 92 MG/DL
NRBC BLD-RTO: 0 /100 WBC
PH UR STRIP: 7 [PH] (ref 5–8)
PLATELET # BLD AUTO: 295 K/UL (ref 150–450)
PMV BLD AUTO: 9.7 FL (ref 9.2–12.9)
POTASSIUM SERPL-SCNC: 3.6 MMOL/L (ref 3.5–5.1)
PROT SERPL-MCNC: 7.3 G/DL (ref 6–8.4)
PROT UR QL STRIP: NEGATIVE
RBC # BLD AUTO: 5.42 M/UL (ref 4.6–6.2)
SODIUM SERPL-SCNC: 143 MMOL/L (ref 136–145)
SP GR UR STRIP: 1.02 (ref 1–1.03)
TESTOST SERPL-MCNC: 423 NG/DL (ref 304–1227)
TRIGL SERPL-MCNC: 113 MG/DL (ref 30–150)
TSH SERPL DL<=0.005 MIU/L-ACNC: 3.37 UIU/ML (ref 0.4–4)
URN SPEC COLLECT METH UR: NORMAL
UROBILINOGEN UR STRIP-ACNC: NEGATIVE EU/DL
WBC # BLD AUTO: 7.59 K/UL (ref 3.9–12.7)

## 2024-02-07 PROCEDURE — 84403 ASSAY OF TOTAL TESTOSTERONE: CPT | Performed by: FAMILY MEDICINE

## 2024-02-07 PROCEDURE — 36415 COLL VENOUS BLD VENIPUNCTURE: CPT | Mod: PO | Performed by: FAMILY MEDICINE

## 2024-02-07 PROCEDURE — 81003 URINALYSIS AUTO W/O SCOPE: CPT | Performed by: FAMILY MEDICINE

## 2024-02-07 PROCEDURE — 85025 COMPLETE CBC W/AUTO DIFF WBC: CPT | Performed by: FAMILY MEDICINE

## 2024-02-07 PROCEDURE — 84443 ASSAY THYROID STIM HORMONE: CPT | Performed by: FAMILY MEDICINE

## 2024-02-07 PROCEDURE — 80061 LIPID PANEL: CPT | Performed by: FAMILY MEDICINE

## 2024-02-07 PROCEDURE — 84153 ASSAY OF PSA TOTAL: CPT | Performed by: FAMILY MEDICINE

## 2024-02-07 PROCEDURE — 80053 COMPREHEN METABOLIC PANEL: CPT | Performed by: FAMILY MEDICINE

## 2024-02-07 PROCEDURE — 83036 HEMOGLOBIN GLYCOSYLATED A1C: CPT | Performed by: FAMILY MEDICINE

## 2024-02-07 PROCEDURE — 83880 ASSAY OF NATRIURETIC PEPTIDE: CPT | Performed by: FAMILY MEDICINE

## 2024-02-08 LAB
ESTIMATED AVG GLUCOSE: 108 MG/DL (ref 68–131)
HBA1C MFR BLD: 5.4 % (ref 4–5.6)

## 2024-05-17 DIAGNOSIS — I70.8 LEFT SUBCLAVIAN ARTERY OCCLUSION: ICD-10-CM

## 2024-05-17 RX ORDER — ATORVASTATIN CALCIUM 10 MG/1
10 TABLET, FILM COATED ORAL
Qty: 90 TABLET | Refills: 1 | Status: SHIPPED | OUTPATIENT
Start: 2024-05-17

## 2024-05-17 NOTE — TELEPHONE ENCOUNTER
Care Due:                  Date            Visit Type   Department     Provider  --------------------------------------------------------------------------------                                [facility]                              PRIMARY      MEDICINE/INTERN  Last Visit: 11-      CARE ([facility])   AL JEANNETTE SMITH      [facility]/OF  MEDICINE/INTERN  Next Visit: 05-      [facility]ICE VISIT   AL JEANNETTE Parisi                                                            Last  Test          Frequency    Reason                     Performed    Due Date  --------------------------------------------------------------------------------    HBA1C.......  6 months...  tirzepatide..............  02- 08-    Health [facility] Embedded Care Due Messages. Reference number: [id number].   5/17/2024 12:09:00 AM CDT

## 2024-05-17 NOTE — TELEPHONE ENCOUNTER
Provider Staff:  Action required for this patient    Requires labs      Please see care gap opportunities below in Care Due Message.    Thanks!  [facility]     Appointments      Date Provider   Last Visit   11/28/2023 Gómez Parisi MD   Next Visit   5/22/2024 Gómez Parisi MD     Refill Decision Note   Garth Tejeda  is requesting a refill authorization.  Brief Assessment and Rationale for Refill:  Approve     Medication Therapy Plan:         Comments:     Note composed:12:32 PM 05/17/2024

## 2024-05-22 ENCOUNTER — OFFICE VISIT (OUTPATIENT)
Dept: FAMILY MEDICINE | Facility: CLINIC | Age: 44
End: 2024-05-22
Payer: COMMERCIAL

## 2024-05-22 VITALS
WEIGHT: 255.75 LBS | HEART RATE: 91 BPM | OXYGEN SATURATION: 98 % | SYSTOLIC BLOOD PRESSURE: 124 MMHG | TEMPERATURE: 98 F | DIASTOLIC BLOOD PRESSURE: 66 MMHG | HEIGHT: 68 IN | BODY MASS INDEX: 38.76 KG/M2

## 2024-05-22 DIAGNOSIS — R79.89 LOW TESTOSTERONE IN MALE: ICD-10-CM

## 2024-05-22 DIAGNOSIS — F41.9 ANXIETY: ICD-10-CM

## 2024-05-22 DIAGNOSIS — K52.9 AGE (ACUTE GASTROENTERITIS): ICD-10-CM

## 2024-05-22 DIAGNOSIS — F98.8 ATTENTION DEFICIT DISORDER, UNSPECIFIED HYPERACTIVITY PRESENCE: Primary | ICD-10-CM

## 2024-05-22 DIAGNOSIS — T75.3XXS MOTION SICKNESS, SEQUELA: ICD-10-CM

## 2024-05-22 PROCEDURE — 3074F SYST BP LT 130 MM HG: CPT | Mod: CPTII,S$GLB,, | Performed by: FAMILY MEDICINE

## 2024-05-22 PROCEDURE — 99999 PR PBB SHADOW E&M-EST. PATIENT-LVL III: CPT | Mod: PBBFAC,,, | Performed by: FAMILY MEDICINE

## 2024-05-22 PROCEDURE — G2211 COMPLEX E/M VISIT ADD ON: HCPCS | Mod: S$GLB,,, | Performed by: FAMILY MEDICINE

## 2024-05-22 PROCEDURE — 3044F HG A1C LEVEL LT 7.0%: CPT | Mod: CPTII,S$GLB,, | Performed by: FAMILY MEDICINE

## 2024-05-22 PROCEDURE — 99215 OFFICE O/P EST HI 40 MIN: CPT | Mod: S$GLB,,, | Performed by: FAMILY MEDICINE

## 2024-05-22 PROCEDURE — 4010F ACE/ARB THERAPY RXD/TAKEN: CPT | Mod: CPTII,S$GLB,, | Performed by: FAMILY MEDICINE

## 2024-05-22 PROCEDURE — 3078F DIAST BP <80 MM HG: CPT | Mod: CPTII,S$GLB,, | Performed by: FAMILY MEDICINE

## 2024-05-22 PROCEDURE — 3008F BODY MASS INDEX DOCD: CPT | Mod: CPTII,S$GLB,, | Performed by: FAMILY MEDICINE

## 2024-05-22 RX ORDER — DIAZEPAM 10 MG/1
10 TABLET ORAL 2 TIMES DAILY
Qty: 60 TABLET | Refills: 2 | Status: SHIPPED | OUTPATIENT
Start: 2024-05-22

## 2024-05-22 RX ORDER — DEXTROAMPHETAMINE SACCHARATE, AMPHETAMINE ASPARTATE, DEXTROAMPHETAMINE SULFATE AND AMPHETAMINE SULFATE 2.5; 2.5; 2.5; 2.5 MG/1; MG/1; MG/1; MG/1
1 TABLET ORAL 2 TIMES DAILY
Qty: 60 TABLET | Refills: 0 | Status: SHIPPED | OUTPATIENT
Start: 2024-05-22

## 2024-05-22 RX ORDER — ONDANSETRON 8 MG/1
8 TABLET, ORALLY DISINTEGRATING ORAL 2 TIMES DAILY
Qty: 20 TABLET | Refills: 11 | Status: SHIPPED | OUTPATIENT
Start: 2024-05-22

## 2024-05-22 RX ORDER — DEXTROAMPHETAMINE SACCHARATE, AMPHETAMINE ASPARTATE, DEXTROAMPHETAMINE SULFATE AND AMPHETAMINE SULFATE 2.5; 2.5; 2.5; 2.5 MG/1; MG/1; MG/1; MG/1
1 TABLET ORAL 2 TIMES DAILY
Qty: 60 TABLET | Refills: 0 | Status: SHIPPED | OUTPATIENT
Start: 2024-07-18

## 2024-05-22 RX ORDER — TESTOSTERONE CYPIONATE 200 MG/ML
200 INJECTION, SOLUTION INTRAMUSCULAR WEEKLY
Qty: 10 ML | Refills: 2 | Status: SHIPPED | OUTPATIENT
Start: 2024-05-22 | End: 2024-11-20

## 2024-05-22 RX ORDER — SCOLOPAMINE TRANSDERMAL SYSTEM 1 MG/1
1 PATCH, EXTENDED RELEASE TRANSDERMAL
Qty: 10 PATCH | Refills: 11 | Status: SHIPPED | OUTPATIENT
Start: 2024-05-22

## 2024-05-22 RX ORDER — DEXTROAMPHETAMINE SACCHARATE, AMPHETAMINE ASPARTATE, DEXTROAMPHETAMINE SULFATE AND AMPHETAMINE SULFATE 2.5; 2.5; 2.5; 2.5 MG/1; MG/1; MG/1; MG/1
1 TABLET ORAL 2 TIMES DAILY
Qty: 60 TABLET | Refills: 0 | Status: SHIPPED | OUTPATIENT
Start: 2024-06-20

## 2024-08-26 ENCOUNTER — LAB VISIT (OUTPATIENT)
Dept: LAB | Facility: HOSPITAL | Age: 44
End: 2024-08-26
Attending: FAMILY MEDICINE
Payer: COMMERCIAL

## 2024-08-26 DIAGNOSIS — Z00.00 ANNUAL PHYSICAL EXAM: Primary | ICD-10-CM

## 2024-08-26 DIAGNOSIS — Z00.00 ANNUAL PHYSICAL EXAM: ICD-10-CM

## 2024-08-26 LAB
ALBUMIN SERPL BCP-MCNC: 4 G/DL (ref 3.5–5.2)
ALP SERPL-CCNC: 96 U/L (ref 55–135)
ALT SERPL W/O P-5'-P-CCNC: 23 U/L (ref 10–44)
ANION GAP SERPL CALC-SCNC: 10 MMOL/L (ref 8–16)
AST SERPL-CCNC: 18 U/L (ref 10–40)
BASOPHILS # BLD AUTO: 0.06 K/UL (ref 0–0.2)
BASOPHILS NFR BLD: 0.8 % (ref 0–1.9)
BILIRUB SERPL-MCNC: 0.3 MG/DL (ref 0.1–1)
BUN SERPL-MCNC: 11 MG/DL (ref 6–20)
CALCIUM SERPL-MCNC: 9.3 MG/DL (ref 8.7–10.5)
CHLORIDE SERPL-SCNC: 105 MMOL/L (ref 95–110)
CHOLEST SERPL-MCNC: 137 MG/DL (ref 120–199)
CHOLEST/HDLC SERPL: 3.2 {RATIO} (ref 2–5)
CO2 SERPL-SCNC: 26 MMOL/L (ref 23–29)
COMPLEXED PSA SERPL-MCNC: 0.42 NG/ML (ref 0–4)
CREAT SERPL-MCNC: 1 MG/DL (ref 0.5–1.4)
DIFFERENTIAL METHOD BLD: NORMAL
EOSINOPHIL # BLD AUTO: 0.1 K/UL (ref 0–0.5)
EOSINOPHIL NFR BLD: 1.5 % (ref 0–8)
ERYTHROCYTE [DISTWIDTH] IN BLOOD BY AUTOMATED COUNT: 13.8 % (ref 11.5–14.5)
EST. GFR  (NO RACE VARIABLE): >60 ML/MIN/1.73 M^2
ESTIMATED AVG GLUCOSE: 105 MG/DL (ref 68–131)
GLUCOSE SERPL-MCNC: 88 MG/DL (ref 70–110)
HBA1C MFR BLD: 5.3 % (ref 4–5.6)
HCT VFR BLD AUTO: 46.4 % (ref 40–54)
HDLC SERPL-MCNC: 43 MG/DL (ref 40–75)
HDLC SERPL: 31.4 % (ref 20–50)
HGB BLD-MCNC: 15.1 G/DL (ref 14–18)
IMM GRANULOCYTES # BLD AUTO: 0.02 K/UL (ref 0–0.04)
IMM GRANULOCYTES NFR BLD AUTO: 0.3 % (ref 0–0.5)
LDLC SERPL CALC-MCNC: 59.6 MG/DL (ref 63–159)
LYMPHOCYTES # BLD AUTO: 1.8 K/UL (ref 1–4.8)
LYMPHOCYTES NFR BLD: 24.7 % (ref 18–48)
MCH RBC QN AUTO: 27.4 PG (ref 27–31)
MCHC RBC AUTO-ENTMCNC: 32.5 G/DL (ref 32–36)
MCV RBC AUTO: 84 FL (ref 82–98)
MONOCYTES # BLD AUTO: 0.6 K/UL (ref 0.3–1)
MONOCYTES NFR BLD: 8.2 % (ref 4–15)
NEUTROPHILS # BLD AUTO: 4.7 K/UL (ref 1.8–7.7)
NEUTROPHILS NFR BLD: 64.5 % (ref 38–73)
NONHDLC SERPL-MCNC: 94 MG/DL
NRBC BLD-RTO: 0 /100 WBC
PLATELET # BLD AUTO: 289 K/UL (ref 150–450)
PMV BLD AUTO: 9.6 FL (ref 9.2–12.9)
POTASSIUM SERPL-SCNC: 3.4 MMOL/L (ref 3.5–5.1)
PROT SERPL-MCNC: 7.4 G/DL (ref 6–8.4)
RBC # BLD AUTO: 5.51 M/UL (ref 4.6–6.2)
SODIUM SERPL-SCNC: 141 MMOL/L (ref 136–145)
TESTOST SERPL-MCNC: 460 NG/DL (ref 304–1227)
TRIGL SERPL-MCNC: 172 MG/DL (ref 30–150)
TSH SERPL DL<=0.005 MIU/L-ACNC: 3.57 UIU/ML (ref 0.4–4)
WBC # BLD AUTO: 7.22 K/UL (ref 3.9–12.7)

## 2024-08-26 PROCEDURE — 85025 COMPLETE CBC W/AUTO DIFF WBC: CPT | Performed by: FAMILY MEDICINE

## 2024-08-26 PROCEDURE — 80053 COMPREHEN METABOLIC PANEL: CPT | Performed by: FAMILY MEDICINE

## 2024-08-26 PROCEDURE — 84443 ASSAY THYROID STIM HORMONE: CPT | Performed by: FAMILY MEDICINE

## 2024-08-26 PROCEDURE — 84153 ASSAY OF PSA TOTAL: CPT | Performed by: FAMILY MEDICINE

## 2024-08-26 PROCEDURE — 84403 ASSAY OF TOTAL TESTOSTERONE: CPT | Performed by: FAMILY MEDICINE

## 2024-08-26 PROCEDURE — 80061 LIPID PANEL: CPT | Performed by: FAMILY MEDICINE

## 2024-08-26 PROCEDURE — 36415 COLL VENOUS BLD VENIPUNCTURE: CPT | Mod: PO | Performed by: FAMILY MEDICINE

## 2024-08-26 PROCEDURE — 83036 HEMOGLOBIN GLYCOSYLATED A1C: CPT | Performed by: FAMILY MEDICINE

## 2024-08-27 ENCOUNTER — PATIENT MESSAGE (OUTPATIENT)
Dept: FAMILY MEDICINE | Facility: CLINIC | Age: 44
End: 2024-08-27
Payer: COMMERCIAL

## 2024-09-03 DIAGNOSIS — R79.89 LOW TESTOSTERONE IN MALE: ICD-10-CM

## 2024-09-03 RX ORDER — TESTOSTERONE CYPIONATE 200 MG/ML
200 INJECTION, SOLUTION INTRAMUSCULAR WEEKLY
Qty: 10 ML | Refills: 2 | Status: SHIPPED | OUTPATIENT
Start: 2024-09-03 | End: 2025-03-04

## 2024-09-03 NOTE — TELEPHONE ENCOUNTER
No care due was identified.  [facility] Embedded Care Due Messages. Reference number: [id number].   9/03/2024 12:05:56 AM CDT

## 2024-09-09 ENCOUNTER — TELEPHONE (OUTPATIENT)
Dept: PHARMACY | Facility: CLINIC | Age: 44
End: 2024-09-09
Payer: COMMERCIAL

## 2024-09-09 DIAGNOSIS — F41.9 ANXIETY: ICD-10-CM

## 2024-09-09 NOTE — TELEPHONE ENCOUNTER
[facility]/Population Health Chart Review & Patient Outreach Details For Medication Adherence Project    Reason for Outreach Encounter: 3rd Party payor non-compliance report ([employer], [employer], [employer], etc)    2.  Patient Outreach Method: Reviewed patient chart  and [employer] message    3.   Medication in question:   Hyperlipidemia Medications               atorvastatin (LIPITOR) 10 MG tablet TAKE 1 TABLET BY MOUTH EVERY DAY                LAST FILLED: 2/14/24 for 90 day supply    4.  Reviewed and or Updates Made To: Patient Chart    5. Outreach Outcomes and/or actions taken: Sent inquiry to patient: Waiting for response    Additional Notes:

## 2024-09-10 RX ORDER — DIAZEPAM 10 MG/1
10 TABLET ORAL 2 TIMES DAILY
Qty: 60 TABLET | Refills: 2 | Status: SHIPPED | OUTPATIENT
Start: 2024-09-10

## 2024-09-10 NOTE — TELEPHONE ENCOUNTER
No care due was identified.  [facility] Embedded Care Due Messages. Reference number: [id number].   9/09/2024 9:19:08 PM CDT

## 2024-09-17 ENCOUNTER — TELEPHONE (OUTPATIENT)
Dept: FAMILY MEDICINE | Facility: CLINIC | Age: 44
End: 2024-09-17
Payer: COMMERCIAL

## 2024-09-17 NOTE — TELEPHONE ENCOUNTER
----- Message from Gale Christiansen sent at 9/17/2024  7:21 AM CDT -----  .Type: Patient Call Back    Who called: Self    What is the request in detail: Have an appointment with Cardiologist on 9/19/24 and need last lab results. Would like to to know can he come  a copy of lab results. Ask that the nurse give him a call.     Can the clinic reply by [facility]? No     Would the patient rather a call back or a response via [facility]? Call Back     Best call back number: .[phone] (home)       Additional Information:

## 2024-10-01 ENCOUNTER — OFFICE VISIT (OUTPATIENT)
Dept: FAMILY MEDICINE | Facility: CLINIC | Age: 44
End: 2024-10-01
Payer: COMMERCIAL

## 2024-10-01 VITALS
DIASTOLIC BLOOD PRESSURE: 78 MMHG | SYSTOLIC BLOOD PRESSURE: 110 MMHG | HEIGHT: 68 IN | WEIGHT: 254.88 LBS | OXYGEN SATURATION: 96 % | BODY MASS INDEX: 38.63 KG/M2 | TEMPERATURE: 99 F | HEART RATE: 83 BPM

## 2024-10-01 DIAGNOSIS — Z12.12 ENCOUNTER FOR COLORECTAL CANCER SCREENING: Primary | ICD-10-CM

## 2024-10-01 DIAGNOSIS — E66.01 SEVERE OBESITY: ICD-10-CM

## 2024-10-01 DIAGNOSIS — F90.9 ATTENTION DEFICIT HYPERACTIVITY DISORDER (ADHD), UNSPECIFIED ADHD TYPE: ICD-10-CM

## 2024-10-01 DIAGNOSIS — F41.9 ANXIETY: ICD-10-CM

## 2024-10-01 DIAGNOSIS — Z12.11 ENCOUNTER FOR COLORECTAL CANCER SCREENING: Primary | ICD-10-CM

## 2024-10-01 DIAGNOSIS — I42.9 CARDIOMYOPATHY, UNSPECIFIED TYPE: ICD-10-CM

## 2024-10-01 PROCEDURE — 99999 PR PBB SHADOW E&M-EST. PATIENT-LVL IV: CPT | Mod: PBBFAC,,, | Performed by: FAMILY MEDICINE

## 2024-10-01 PROCEDURE — 99215 OFFICE O/P EST HI 40 MIN: CPT | Mod: S$GLB,,, | Performed by: FAMILY MEDICINE

## 2024-10-01 PROCEDURE — 3008F BODY MASS INDEX DOCD: CPT | Mod: CPTII,S$GLB,, | Performed by: FAMILY MEDICINE

## 2024-10-01 PROCEDURE — 3044F HG A1C LEVEL LT 7.0%: CPT | Mod: CPTII,S$GLB,, | Performed by: FAMILY MEDICINE

## 2024-10-01 PROCEDURE — 3074F SYST BP LT 130 MM HG: CPT | Mod: CPTII,S$GLB,, | Performed by: FAMILY MEDICINE

## 2024-10-01 PROCEDURE — G2211 COMPLEX E/M VISIT ADD ON: HCPCS | Mod: S$GLB,,, | Performed by: FAMILY MEDICINE

## 2024-10-01 PROCEDURE — 3078F DIAST BP <80 MM HG: CPT | Mod: CPTII,S$GLB,, | Performed by: FAMILY MEDICINE

## 2024-10-01 PROCEDURE — 4010F ACE/ARB THERAPY RXD/TAKEN: CPT | Mod: CPTII,S$GLB,, | Performed by: FAMILY MEDICINE

## 2024-10-01 PROCEDURE — 1159F MED LIST DOCD IN RCRD: CPT | Mod: CPTII,S$GLB,, | Performed by: FAMILY MEDICINE

## 2024-10-01 RX ORDER — DEXTROAMPHETAMINE SACCHARATE, AMPHETAMINE ASPARTATE, DEXTROAMPHETAMINE SULFATE AND AMPHETAMINE SULFATE 2.5; 2.5; 2.5; 2.5 MG/1; MG/1; MG/1; MG/1
1 TABLET ORAL 2 TIMES DAILY
Qty: 60 TABLET | Refills: 0 | Status: SHIPPED | OUTPATIENT
Start: 2024-11-28

## 2024-10-01 RX ORDER — DEXTROAMPHETAMINE SACCHARATE, AMPHETAMINE ASPARTATE, DEXTROAMPHETAMINE SULFATE AND AMPHETAMINE SULFATE 2.5; 2.5; 2.5; 2.5 MG/1; MG/1; MG/1; MG/1
1 TABLET ORAL 2 TIMES DAILY
Qty: 60 TABLET | Refills: 0 | Status: SHIPPED | OUTPATIENT
Start: 2024-10-01

## 2024-10-01 RX ORDER — DEXTROAMPHETAMINE SACCHARATE, AMPHETAMINE ASPARTATE, DEXTROAMPHETAMINE SULFATE AND AMPHETAMINE SULFATE 2.5; 2.5; 2.5; 2.5 MG/1; MG/1; MG/1; MG/1
1 TABLET ORAL 2 TIMES DAILY
Qty: 60 TABLET | Refills: 0 | Status: SHIPPED | OUTPATIENT
Start: 2024-10-31

## 2024-10-01 RX ORDER — DIAZEPAM 10 MG/1
10 TABLET ORAL 2 TIMES DAILY
Qty: 60 TABLET | Refills: 2 | Status: SHIPPED | OUTPATIENT
Start: 2024-10-01

## 2024-10-01 NOTE — ASSESSMENT & PLAN NOTE
The current medical regimen is effective;  continue present plan and medications.  Doing bettter a little

## 2024-10-01 NOTE — PROGRESS NOTES
"Chief Complaint   Patient presents with    Medication Refill       SUBJECTIVE:  Garth Tejeda is a 44 y.o. male here for follow up of ADHD.   Patient has ADHD and is well controleld without drug side effects and doing well overall without any unusual symptoms or history.         Currently has co-morbidities including below problem list.        Patient Active Problem List    Diagnosis Date Noted    Prediabetes 09/15/2022    Severe obesity (BMI 35.0-39.9) with comorbidity 10/26/2021    Cardiomyopathy, unspecified type 10/26/2021    VALENTÍN (obstructive sleep apnea) 02/19/2021     Patient with symptoms of  Daytime sleepiness ESS 8, long irritated uvula, snoring and fatigue .  PSG 2/1/2021:    The overall AHI was 7.6 with an oxygen tracey of 85.0%. The AHI in REM sleep was 24.9. The central apnea index was 0.2. The supine AHI was 7.2.   Patient is using and benefiting from cpap 4-11 cm H2O. Residual predicted AHI optimal.       Bacterial sinusitis 01/05/2021    Body water dehydration 09/23/2019    Abnormal ECG 02/01/2018     Formatting of this note might be different from the original.  Echocardiogram shows upper normal LV size, normal LV thickness, aortic root borderline increased size, left atrium probably normal in size based on KATARZYNA of 16, EF 50%, diastolic function normal, mild TR.      Mild obesity 11/08/2017    Anxiety about health 02/22/2017     Long standing  Seen by psych, no admissions  effexor was weight gain  Ativan PRN      ADD (attention deficit disorder) 02/22/2017    Hypertension, essential 02/22/2017    Left subclavian artery occlusion 03/28/2016     Secondary to muscle impingement, has collateral flow now, just monitoring      History of congenital heart defect 12/31/2015    Diminished pulse 12/31/2015     LEFT UPPER EXTREMITY         ROS    OBJECTIVE:  /78   Pulse 83   Temp 98.5 °F (36.9 °C) (Oral)   Ht 5' 8" (1.727 m)   Wt 115.6 kg (254 lb 13.6 oz)   SpO2 96%   BMI 38.75 kg/m²     Wt " Readings from Last 3 Encounters:   10/01/24 115.6 kg (254 lb 13.6 oz)   05/22/24 116 kg (255 lb 11.7 oz)   11/28/23 113 kg (249 lb 1.9 oz)     BP Readings from Last 3 Encounters:   10/01/24 110/78   05/22/24 124/66   08/30/23 120/80       Patient is in usual state of health, alert and oriented without signs of intoxication.  No evidence on exam of illegal substance use or concerning symptoms involving abuse or intoxication (specifically evidence of IVDU, unusual bruising, slurred speech, unusual explanations).               Review of old Records:  Reviewed per epic and   NARx OD score/stimulant score: reviewed  Review of old labs:    Lab Results   Component Value Date    TSH 3.570 08/26/2024     Lab Results   Component Value Date    WBC 7.22 08/26/2024    HGB 15.1 08/26/2024    HCT 46.4 08/26/2024    MCV 84 08/26/2024     08/26/2024       Chemistry        Component Value Date/Time     08/26/2024 0828    K 3.4 (L) 08/26/2024 0828     08/26/2024 0828    CO2 26 08/26/2024 0828    BUN 11 08/26/2024 0828    CREATININE 1.0 08/26/2024 0828    GLU 88 08/26/2024 0828        Component Value Date/Time    CALCIUM 9.3 08/26/2024 0828    ALKPHOS 96 08/26/2024 0828    AST 18 08/26/2024 0828    ALT 23 08/26/2024 0828    BILITOT 0.3 08/26/2024 0828    ESTGFRAFRICA >60 11/03/2021 0743    EGFRNONAA >60 11/03/2021 0743        Lab Results   Component Value Date    CHOL 137 08/26/2024    CHOL 136 02/07/2024    CHOL 123 11/03/2021     Lab Results   Component Value Date    HDL 43 08/26/2024    HDL 44 02/07/2024    HDL 42 11/03/2021     Lab Results   Component Value Date    LDLCALC 59.6 (L) 08/26/2024    LDLCALC 69.4 02/07/2024    LDLCALC 61.4 (L) 11/03/2021     Lab Results   Component Value Date    TRIG 172 (H) 08/26/2024    TRIG 113 02/07/2024    TRIG 98 11/03/2021     Lab Results   Component Value Date    CHOLHDL 31.4 08/26/2024    CHOLHDL 32.4 02/07/2024    CHOLHDL 34.1 11/03/2021         Review of old  imaging:  Reviewed last EKG tracing if available.          ASSESSMENT:    ICD-10-CM ICD-9-CM   1. Encounter for colorectal cancer screening  Z12.11 V76.51    Z12.12 V76.41   2. Attention deficit hyperactivity disorder (ADHD), unspecified ADHD type  F90.9 314.01   3. Severe obesity  E66.01 278.01   4. Cardiomyopathy, unspecified type  I42.9 425.4   5. Anxiety  F41.9 300.00       No evidence of abuse/diversion/addiction noted through history and physical, or checking the .  Risks, benefits and alternate treatments are considered and plan of care reflects that shared decision with the patient.  Went over schedule II responsibilities, including abuse, side effects, refill restrictions, necessary visits, drug testing, protection of medication.  Patient voices understanding.      PLAN:    Problem List Items Addressed This Visit       ADD (attention deficit disorder)    Relevant Medications    dextroamphetamine-amphetamine 10 mg Tab    dextroamphetamine-amphetamine 10 mg Tab (Start on 10/31/2024)    dextroamphetamine-amphetamine 10 mg Tab (Start on 11/28/2024)    Severe obesity (BMI 35.0-39.9) with comorbidity    Current Assessment & Plan     The patient is asked to make an attempt to improve diet and exercise patterns to aid in medical management of this problem.  glp1RA to monitor and see if we can get it more consistently         Cardiomyopathy, unspecified type    Current Assessment & Plan     The current medical regimen is effective;  continue present plan and medications.  Doing bettter a little          Other Visit Diagnoses       Encounter for colorectal cancer screening    -  Primary    Relevant Orders    Cologuard Screening (Multitarget Stool DNA)    Anxiety        Relevant Medications    diazePAM (VALIUM) 10 MG Tab              Medication List with Changes/Refills   New Medications    DEXTROAMPHETAMINE-AMPHETAMINE 10 MG TAB    Take 1 tablet (10 mg total) by mouth 2 (two) times daily.     DEXTROAMPHETAMINE-AMPHETAMINE 10 MG TAB    Take 1 tablet (10 mg total) by mouth 2 (two) times daily.   Current Medications    ALBUTEROL (PROVENTIL/VENTOLIN HFA) 90 MCG/ACTUATION INHALER    Inhale 2 puffs into the lungs every 6 (six) hours as needed for Wheezing.    ASPIRIN (ECOTRIN) 81 MG EC TABLET    Take 81 mg by mouth once daily.    ATORVASTATIN (LIPITOR) 10 MG TABLET    TAKE 1 TABLET BY MOUTH EVERY DAY    DESLORATADINE (CLARINEX) 5 MG TABLET    TAKE 1 TABLET BY MOUTH EVERY DAY    DEXTROAMPHETAMINE-AMPHETAMINE 10 MG TAB    Take 1 tablet (10 mg total) by mouth 2 (two) times daily.    DEXTROAMPHETAMINE-AMPHETAMINE 10 MG TAB    Take 1 tablet (10 mg total) by mouth 2 (two) times daily.    DILTIAZEM (TIAZAC) 240 MG CS24    Take 240 mg by mouth.    FLUTICASONE PROPIONATE (FLONASE) 50 MCG/ACTUATION NASAL SPRAY    SMARTSI Spray(s) Both Nares Every Evening    HYDROCHLOROTHIAZIDE (HYDRODIURIL) 25 MG TABLET    Take 25 mg by mouth.    LISINOPRIL 10 MG TABLET    Take 1 tablet (10 mg total) by mouth 2 (two) times daily.    ONDANSETRON (ZOFRAN-ODT) 8 MG TBDL    Take 1 tablet (8 mg total) by mouth 2 (two) times daily.    SCOPOLAMINE (TRANSDERM-SCOP) 1.3-1.5 MG (1 MG OVER 3 DAYS)    Place 1 patch onto the skin every 72 hours.    TESTOSTERONE CYPIONATE (DEPOTESTOTERONE CYPIONATE) 200 MG/ML INJECTION    INJECT 1 ML (200 MG TOTAL) INTO THE MUSCLE ONCE A WEEK.    TIRZEPATIDE 10 MG/0.5 ML PNIJ    Inject 10 mg into the skin every 7 days.    TIRZEPATIDE 12.5 MG/0.5 ML PNIJ    Inject 12.5 mg into the skin every 7 days.   Changed and/or Refilled Medications    Modified Medication Previous Medication    DEXTROAMPHETAMINE-AMPHETAMINE 10 MG TAB dextroamphetamine-amphetamine 10 mg Tab       Take 1 tablet (10 mg total) by mouth 2 (two) times daily.    Take 1 tablet (10 mg total) by mouth 2 (two) times daily.    DIAZEPAM (VALIUM) 10 MG TAB diazePAM (VALIUM) 10 MG Tab       Take 1 tablet (10 mg total) by mouth 2 (two) times daily.    Take 1  tablet (10 mg total) by mouth 2 (two) times daily.         Continue with current care unless changes noted below.    High risk medication review completed per guidelines to insure safest use of medication.      We reviewed our goals of care:    Improvement in concentration and mood  Aid focus in completing tasks at work/school  Safety first priority      And discontinuation      Intolerable side effects  Evidence of abuse/misuse or diversion      Future Appointments   Date Time Provider Department Center   1/8/2025  2:00 PM Gómez Parisi MD [facility] [place]       3 months or sooner as needed

## 2024-10-01 NOTE — ASSESSMENT & PLAN NOTE
The patient is asked to make an attempt to improve diet and exercise patterns to aid in medical management of this problem.  glp1RA to monitor and see if we can get it more consistently

## 2024-10-18 DIAGNOSIS — I70.8 LEFT SUBCLAVIAN ARTERY OCCLUSION: ICD-10-CM

## 2024-10-18 DIAGNOSIS — R79.89 LOW TESTOSTERONE IN MALE: ICD-10-CM

## 2024-10-18 DIAGNOSIS — Z87.74 HISTORY OF CONGENITAL HEART DEFECT: ICD-10-CM

## 2024-10-18 RX ORDER — LISINOPRIL 10 MG/1
10 TABLET ORAL 2 TIMES DAILY
Qty: 180 TABLET | Refills: 3 | Status: SHIPPED | OUTPATIENT
Start: 2024-10-18

## 2024-10-18 RX ORDER — TESTOSTERONE CYPIONATE 200 MG/ML
200 INJECTION, SOLUTION INTRAMUSCULAR WEEKLY
Qty: 10 ML | Refills: 2 | Status: SHIPPED | OUTPATIENT
Start: 2024-10-18 | End: 2025-04-18

## 2024-10-18 NOTE — TELEPHONE ENCOUNTER
No care due was identified.  [facility] Embedded Care Due Messages. Reference number: [id number].   10/18/2024 11:48:26 AM CDT

## 2024-10-18 NOTE — TELEPHONE ENCOUNTER
No care due was identified.  [facility] Embedded Care Due Messages. Reference number: [id number].   10/18/2024 11:49:03 AM CDT

## 2024-11-20 ENCOUNTER — PATIENT MESSAGE (OUTPATIENT)
Dept: FAMILY MEDICINE | Facility: CLINIC | Age: 44
End: 2024-11-20
Payer: COMMERCIAL

## 2024-11-20 ENCOUNTER — E-VISIT (OUTPATIENT)
Dept: FAMILY MEDICINE | Facility: CLINIC | Age: 44
End: 2024-11-20
Payer: COMMERCIAL

## 2024-11-20 DIAGNOSIS — H10.9 CONJUNCTIVITIS, UNSPECIFIED CONJUNCTIVITIS TYPE, UNSPECIFIED LATERALITY: Primary | ICD-10-CM

## 2024-11-20 RX ORDER — TOBRAMYCIN 3 MG/ML
1 SOLUTION/ DROPS OPHTHALMIC EVERY 4 HOURS
Qty: 5 ML | Refills: 0 | Status: SHIPPED | OUTPATIENT
Start: 2024-11-20

## 2024-11-20 NOTE — PROGRESS NOTES
Patient ID: Garth Tejeda is a 44 y.o. male.    Chief Complaint: General Illness (Entered automatically based on patient selection in [employer].)    The patient initiated a request through [employer] on 11/20/2024 for evaluation and management with a chief complaint of General Illness (Entered automatically based on patient selection in [employer].)     I evaluated the questionnaire submission on 11/20/2024  .    [facility]-Skin Hair Nails    11/20/2024 10:09 AM CST - Filed by Patient   What do you need help with? Skin   What concern do you have about your skin? Other Concern   Do you agree to participate in an E-Visit? Yes   If you have any of the following symptoms, please present to your local emergency room or call 911:  I acknowledge   What is the main issue you would like addressed today? Pink eyw   Please describe your symptoms Red crusty eyes   Where is your problem located? Eyes   How severe are your symptoms? Moderate   Have you had these symptoms before? Yes   How long have you been having these symptoms? For a few days   Please list any medications or treatments you have used for your condition and indicate if it was effective or not. Eye drops   What makes this feel better? Eye dropa   What makes this feel worse? Nothing   Are these symptoms related to a condition that you currently have? No   Please describe any probable cause for these symptoms Irritated eyes   Provide any additional information you feel is important. Pink eye is the only issue   Please attach any relevant images or files    Are you able to take your vital signs? No         Encounter Diagnosis   Name Primary?    Conjunctivitis, unspecified conjunctivitis type, unspecified laterality Yes        No orders of the defined types were placed in this encounter.     Medications Ordered This Encounter   Medications    tobramycin sulfate 0.3% (TOBREX) 0.3 % ophthalmic solution     Sig: Place 1 drop into both eyes every 4 (four) hours.      Dispense:  5 mL     Refill:  0        No follow-ups on file.      E-Visit Time Tracking:    Day 1 Time (in minutes): 6    Total Time (in minutes): 6

## 2024-11-25 ENCOUNTER — HOSPITAL ENCOUNTER (EMERGENCY)
Facility: HOSPITAL | Age: 44
Discharge: HOME | End: 2024-11-25
Attending: STUDENT IN AN ORGANIZED HEALTH CARE EDUCATION/TRAINING PROGRAM
Payer: COMMERCIAL

## 2024-11-25 VITALS
HEART RATE: 81 BPM | TEMPERATURE: 98 F | DIASTOLIC BLOOD PRESSURE: 60 MMHG | WEIGHT: 200 LBS | SYSTOLIC BLOOD PRESSURE: 96 MMHG | OXYGEN SATURATION: 96 % | BODY MASS INDEX: 30.41 KG/M2 | RESPIRATION RATE: 16 BRPM

## 2024-11-25 DIAGNOSIS — Z87.74 HISTORY OF CONGENITAL HEART DEFECT: ICD-10-CM

## 2024-11-25 DIAGNOSIS — I49.9 IRREGULAR CARDIAC RHYTHM: Primary | ICD-10-CM

## 2024-11-25 DIAGNOSIS — R00.2 PALPITATIONS: ICD-10-CM

## 2024-11-25 DIAGNOSIS — I70.8 LEFT SUBCLAVIAN ARTERY OCCLUSION: ICD-10-CM

## 2024-11-25 LAB
ALBUMIN SERPL BCP-MCNC: 3.8 G/DL (ref 3.5–5.2)
ALP SERPL-CCNC: 98 U/L (ref 40–150)
ALT SERPL W/O P-5'-P-CCNC: 33 U/L (ref 10–44)
ANION GAP SERPL CALC-SCNC: 12 MMOL/L (ref 8–16)
AST SERPL-CCNC: 16 U/L (ref 10–40)
BASOPHILS # BLD AUTO: 0.07 K/UL (ref 0–0.2)
BASOPHILS NFR BLD: 0.8 % (ref 0–1.9)
BILIRUB SERPL-MCNC: 0.4 MG/DL (ref 0.1–1)
BNP SERPL-MCNC: 132 PG/ML (ref 0–99)
BUN SERPL-MCNC: 13 MG/DL (ref 6–20)
CALCIUM SERPL-MCNC: 9.2 MG/DL (ref 8.7–10.5)
CHLORIDE SERPL-SCNC: 107 MMOL/L (ref 95–110)
CO2 SERPL-SCNC: 19 MMOL/L (ref 23–29)
CREAT SERPL-MCNC: 0.9 MG/DL (ref 0.5–1.4)
DIFFERENTIAL METHOD BLD: ABNORMAL
EOSINOPHIL # BLD AUTO: 0.2 K/UL (ref 0–0.5)
EOSINOPHIL NFR BLD: 2 % (ref 0–8)
ERYTHROCYTE [DISTWIDTH] IN BLOOD BY AUTOMATED COUNT: 14.3 % (ref 11.5–14.5)
EST. GFR  (NO RACE VARIABLE): >60 ML/MIN/1.73 M^2
GLUCOSE SERPL-MCNC: 96 MG/DL (ref 70–110)
HCT VFR BLD AUTO: 43.4 % (ref 40–54)
HGB BLD-MCNC: 14.7 G/DL (ref 14–18)
IMM GRANULOCYTES # BLD AUTO: 0.02 K/UL (ref 0–0.04)
IMM GRANULOCYTES NFR BLD AUTO: 0.2 % (ref 0–0.5)
LYMPHOCYTES # BLD AUTO: 3.4 K/UL (ref 1–4.8)
LYMPHOCYTES NFR BLD: 38.9 % (ref 18–48)
MCH RBC QN AUTO: 27.8 PG (ref 27–31)
MCHC RBC AUTO-ENTMCNC: 33.9 G/DL (ref 32–36)
MCV RBC AUTO: 82 FL (ref 82–98)
MONOCYTES # BLD AUTO: 0.8 K/UL (ref 0.3–1)
MONOCYTES NFR BLD: 9.5 % (ref 4–15)
NEUTROPHILS # BLD AUTO: 4.2 K/UL (ref 1.8–7.7)
NEUTROPHILS NFR BLD: 48.6 % (ref 38–73)
NRBC BLD-RTO: 0 /100 WBC
OHS QRS DURATION: 86 MS
OHS QTC CALCULATION: 396 MS
PLATELET # BLD AUTO: 279 K/UL (ref 150–450)
PMV BLD AUTO: 9.1 FL (ref 9.2–12.9)
POTASSIUM SERPL-SCNC: 4.1 MMOL/L (ref 3.5–5.1)
PROT SERPL-MCNC: 7.4 G/DL (ref 6–8.4)
RBC # BLD AUTO: 5.29 M/UL (ref 4.6–6.2)
SODIUM SERPL-SCNC: 138 MMOL/L (ref 136–145)
TROPONIN I SERPL DL<=0.01 NG/ML-MCNC: <0.006 NG/ML (ref 0–0.03)
WBC # BLD AUTO: 8.67 K/UL (ref 3.9–12.7)

## 2024-11-25 PROCEDURE — 96374 THER/PROPH/DIAG INJ IV PUSH: CPT

## 2024-11-25 PROCEDURE — 99285 EMERGENCY DEPT VISIT HI MDM: CPT | Mod: 25

## 2024-11-25 PROCEDURE — 84484 ASSAY OF TROPONIN QUANT: CPT | Performed by: STUDENT IN AN ORGANIZED HEALTH CARE EDUCATION/TRAINING PROGRAM

## 2024-11-25 PROCEDURE — 93010 ELECTROCARDIOGRAM REPORT: CPT | Mod: ,,, | Performed by: INTERNAL MEDICINE

## 2024-11-25 PROCEDURE — 93005 ELECTROCARDIOGRAM TRACING: CPT

## 2024-11-25 PROCEDURE — 25000003 PHARM REV CODE 250: Performed by: STUDENT IN AN ORGANIZED HEALTH CARE EDUCATION/TRAINING PROGRAM

## 2024-11-25 PROCEDURE — 80053 COMPREHEN METABOLIC PANEL: CPT | Performed by: STUDENT IN AN ORGANIZED HEALTH CARE EDUCATION/TRAINING PROGRAM

## 2024-11-25 PROCEDURE — 83880 ASSAY OF NATRIURETIC PEPTIDE: CPT | Performed by: STUDENT IN AN ORGANIZED HEALTH CARE EDUCATION/TRAINING PROGRAM

## 2024-11-25 PROCEDURE — 85025 COMPLETE CBC W/AUTO DIFF WBC: CPT | Performed by: STUDENT IN AN ORGANIZED HEALTH CARE EDUCATION/TRAINING PROGRAM

## 2024-11-25 RX ORDER — METOPROLOL SUCCINATE 25 MG/1
12.5 TABLET, EXTENDED RELEASE ORAL DAILY
Qty: 15 TABLET | Refills: 1 | Status: SHIPPED | OUTPATIENT
Start: 2024-11-25 | End: 2024-12-25

## 2024-11-25 RX ORDER — METOPROLOL TARTRATE 1 MG/ML
2.5 INJECTION, SOLUTION INTRAVENOUS
Status: COMPLETED | OUTPATIENT
Start: 2024-11-25 | End: 2024-11-25

## 2024-11-25 RX ORDER — OLANZAPINE 10 MG/2ML
5 INJECTION, POWDER, FOR SOLUTION INTRAMUSCULAR ONCE
Status: DISCONTINUED | OUTPATIENT
Start: 2024-11-25 | End: 2024-11-25

## 2024-11-25 RX ORDER — SPIRONOLACTONE 25 MG/1
25 TABLET ORAL
COMMUNITY

## 2024-11-25 RX ADMIN — METOROPROLOL TARTRATE 2.5 MG: 5 INJECTION, SOLUTION INTRAVENOUS at 02:11

## 2024-11-25 RX ADMIN — METOPROLOL SUCCINATE 12.5 MG: 25 TABLET, EXTENDED RELEASE ORAL at 03:11

## 2024-11-25 NOTE — ED PROVIDER NOTES
Encounter Date: 11/25/2024    SCRIBE #1 NOTE: I, Yanci Sepulveda, am scribing for, and in the presence of,  Eben Maldonado MD. I have scribed the following portions of the note - Other sections scribed: HPI, ROS, PE.       History     Chief Complaint   Patient presents with    Tachycardia     Pt states high HR today with palpitations. Pt states took home EKG and it states A fib. Pt with repaired ASD as a child. Sees cardiology yearly. No SOB no pain. EKG given to MD.      HPI: 44 year old male, with a PMHx of anxiety, hypertension, septal defect, presents to the ED for evaluation of palpitations, symptoms onset prior to arrival. Reports associated left eye redness for 1 week. States he took an at home EKG that stated afib. States he has a history of panic attacks and his palpitations feel similar. States he follows with a cardiologist. No other alleviating or exacerbating factors. Denies shortness of breath, cough, chest pain, fever, chills, or other associated symptoms. This is the extent of the patient's complaints in the ED.    The history is provided by the patient. No  was used.     Review of patient's allergies indicates:  No Known Allergies  Past Medical History:   Diagnosis Date    Anxiety     Hypertension     Septal defect      Past Surgical History:   Procedure Laterality Date    CARDIAC SURGERY      WRIST SURGERY      right     Family History   Problem Relation Name Age of Onset    Diabetes Mother      Hypertension Mother       Social History     Tobacco Use    Smoking status: Never    Smokeless tobacco: Never   Substance Use Topics    Alcohol use: Yes     Comment: socially     Drug use: Yes     Types: Marijuana     Review of Systems   Constitutional:  Negative for chills and fever.   HENT:  Negative for congestion, rhinorrhea and sore throat.    Eyes:  Positive for redness (left). Negative for visual disturbance.   Respiratory:  Negative for cough and shortness of breath.     Cardiovascular:  Positive for palpitations. Negative for chest pain.   Gastrointestinal:  Negative for abdominal pain, diarrhea, nausea and vomiting.   Genitourinary:  Negative for dysuria, frequency and hematuria.   Musculoskeletal:  Negative for back pain.   Skin:  Negative for rash.   Neurological:  Negative for dizziness, weakness and headaches.       Physical Exam     Initial Vitals [11/25/24 0200]   BP Pulse Resp Temp SpO2   (!) 94/51 74 18 97.6 °F (36.4 °C) 97 %      MAP       --         Physical Exam    Nursing note and vitals reviewed.  Constitutional: He appears well-developed and well-nourished. He is not diaphoretic. No distress.   HENT:   Head: Normocephalic and atraumatic.   Eyes: EOM are normal. Pupils are equal, round, and reactive to light.   Right-sided subconjunctival hemorrhage   Neck: Neck supple.   Normal range of motion.  Cardiovascular:  Normal heart sounds and intact distal pulses. An irregularly irregular rhythm present.   Tachycardia present.   Exam reveals no gallop and no friction rub.       No murmur heard.  Pulmonary/Chest: Breath sounds normal. No respiratory distress. He has no wheezes. He has no rhonchi. He has no rales.   Abdominal: Abdomen is soft. He exhibits no distension. There is no abdominal tenderness. There is no rebound and no guarding.   Musculoskeletal:         General: No tenderness or edema. Normal range of motion.      Cervical back: Normal range of motion and neck supple.      Comments: No leg edema to bilateral lower extremities.      Neurological: He is alert and oriented to person, place, and time. He has normal strength.   Skin: Skin is warm and dry. No rash noted.         ED Course   Procedures  Labs Reviewed   CBC W/ AUTO DIFFERENTIAL - Abnormal       Result Value    WBC 8.67      RBC 5.29      Hemoglobin 14.7      Hematocrit 43.4      MCV 82      MCH 27.8      MCHC 33.9      RDW 14.3      Platelets 279      MPV 9.1 (*)     Immature Granulocytes 0.2      Gran  # (ANC) 4.2      Immature Grans (Abs) 0.02      Lymph # 3.4      Mono # 0.8      Eos # 0.2      Baso # 0.07      nRBC 0      Gran % 48.6      Lymph % 38.9      Mono % 9.5      Eosinophil % 2.0      Basophil % 0.8      Differential Method Automated     COMPREHENSIVE METABOLIC PANEL - Abnormal    Sodium 138      Potassium 4.1      Chloride 107      CO2 19 (*)     Glucose 96      BUN 13      Creatinine 0.9      Calcium 9.2      Total Protein 7.4      Albumin 3.8      Total Bilirubin 0.4      Alkaline Phosphatase 98      AST 16      ALT 33      eGFR >60      Anion Gap 12     B-TYPE NATRIURETIC PEPTIDE - Abnormal     (*)    TROPONIN I    Troponin I <0.006            Imaging Results              X-Ray Chest PA And Lateral (In process)                      Medications   metoprolol injection 2.5 mg (2.5 mg Intravenous Given 11/25/24 0235)   metoprolol succinate (TOPROL-XL) 24 hr split tablet 12.5 mg (12.5 mg Oral Given 11/25/24 6836)     Medical Decision Making  Amount and/or Complexity of Data Reviewed  Labs: ordered. Decision-making details documented in ED Course.  Radiology: ordered. Decision-making details documented in ED Course.  ECG/medicine tests: ordered and independent interpretation performed. Decision-making details documented in ED Course.    Risk  Prescription drug management.    BP recorded as 94/51  On my assessment in the room patient's blood pressure was 120 systolic with a heart rate labile from 110-120  Well-appearing in no acute distress on exam; not volume overloaded; not concerned that he was in heart failure  EKG shows atrial fibrillation versus atrial flutter  Troponin negative; electrolytes within normal limits  I have a low suspicion for ACS, PE, dissection  Given 2.5 of metoprolol with improvement in the rates to 80s to 90s; remains irregular  Repeat blood pressure 107/70; given 12.5 mg of metoprolol succinate   Observed for an hour without any significant symptoms; blood pressure remains  in his range   Advised to monitor his blood pressure closely and to hold his blood pressure medications if his blood pressure remains less than 140 systolic  [id number] of 1   Started on Eliquis 5 mg b.i.d.  Patient indicated that he would follow up closely with his cardiologist on an outpatient basis  Advised to follow up his cardiologist soon as possible   Advised to return for worsening palpitations, shortness of breath, chest pain, dizziness, syncope  Patient verbalized understanding and agreement with the plan    I discussed with the patient/family the diagnosis, treatment plan, indications for return to the emergency department, and for expected follow-up. The patient/family verbalized an understanding. The patient/family is asked if there are any questions or concerns. We discuss the case, until all issues are addressed to the patient/familys satisfaction. Patient/family understands and is agreeable to the plan.   Eben Maldonado    DISCLAIMER: This note was prepared with [employer] voice recognition transcription software. Garbled syntax, mangled pronouns, and other bizarre constructions may be attributed to that software system.          Scribe Attestation:   Scribe #1: I performed the above scribed service and the documentation accurately describes the services I performed. I attest to the accuracy of the note.                               Clinical Impression:  Final diagnoses:  [R00.2] Palpitations  [I49.9] Irregular cardiac rhythm (Primary)          ED Disposition Condition    Discharge Stable          I, Eben Maldonado MD, personally performed the services described in this documentation. All medical record entries made by the scribe were at my direction and in my presence. I have reviewed the chart and agree that the record reflects my personal performance and is accurate and complete.      DISCLAIMER: This note was prepared with [employer] voice recognition transcription software. Garbled syntax,  mangled pronouns, and other bizarre constructions may be attributed to that software system.  ED Prescriptions       Medication Sig Dispense Start Date End Date Auth. Provider    apixaban (ELIQUIS) 5 mg Tab Take 1 tablet (5 mg total) by mouth 2 (two) times daily. 60 tablet 11/25/2024 12/25/2024 Eben Maldonado MD    metoprolol succinate (TOPROL-XL) 25 MG 24 hr tablet Take 0.5 tablets (12.5 mg total) by mouth once daily. 15 tablet 11/25/2024 12/25/2024 Eben Maldonado MD          Follow-up Information    None          Eben Maldonado MD  11/25/24 4633

## 2024-11-25 NOTE — DISCHARGE INSTRUCTIONS
Take metoprolol 12.5 mg daily.  Take Eliquis 5 mg twice a day.  Follow up with the cardiologist as soon as possible an outpatient basis.  Return if you develop worsening tachycardia, shortness of breath, chest pain, dizziness, passing out or other concerning symptoms.  Thank you.

## 2024-11-25 NOTE — ED TRIAGE NOTES
"Presents c/o palpitations ongoing throughout the day. Endorses hx of anxiety and took Valium tonight w/ relief of s/s but states palpitations awoke him from his sleep, checked his heart rate on a "gadget" that displayed he was in Afib. Denies known cardiac arrhythmias. Denies CP, SOB, lightheadedness/dizziness, or any other s/s other than anxiousness and on/off palpitations.   "

## 2024-12-02 ENCOUNTER — TELEPHONE (OUTPATIENT)
Dept: PHARMACY | Facility: CLINIC | Age: 44
End: 2024-12-02
Payer: COMMERCIAL

## 2024-12-03 NOTE — TELEPHONE ENCOUNTER
[facility]/Population Health Chart Review & Patient Outreach Details For Medication Adherence Project    Reason for Outreach Encounter: 3rd Party payor non-compliance report ([employer], [employer], C, etc)  2.  Patient Outreach Method: Reviewed Patient Chart  3.   Medication in question: lisinopril    LAST FILLED: 11/18/24 for 30 day supply  Hypertension Medications               diltiaZEM (TIAZAC) 240 MG Cs24 Take 240 mg by mouth.    hydroCHLOROthiazide (HYDRODIURIL) 25 MG tablet Take 25 mg by mouth.    lisinopriL 10 MG tablet Take 1 tablet (10 mg total) by mouth 2 (two) times daily.    metoprolol succinate (TOPROL-XL) 25 MG 24 hr tablet Take 0.5 tablets (12.5 mg total) by mouth once daily.    spironolactone (ALDACTONE) 25 MG tablet Take 25 mg by mouth.              Hyperlipidemia Medications               atorvastatin (LIPITOR) 10 MG tablet TAKE 1 TABLET BY MOUTH EVERY DAY               4.  Reviewed and or Updates Made To: Patient Chart  5. Outreach Outcomes and/or actions taken: Patient filled medication and is on track to be adherent

## 2024-12-13 DIAGNOSIS — F41.9 ANXIETY: ICD-10-CM

## 2024-12-13 RX ORDER — DIAZEPAM 10 MG/1
10 TABLET ORAL 2 TIMES DAILY
Qty: 60 TABLET | Refills: 2 | Status: SHIPPED | OUTPATIENT
Start: 2024-12-13

## 2024-12-13 NOTE — TELEPHONE ENCOUNTER
Care Due:                  Date            Visit Type   Department     Provider  --------------------------------------------------------------------------------                                SARAH      [facility]/OF  MEDICINE/INTERN  Last Visit: 10-      FICE VISIT   [facility]         Gómez Parisi                               -         [facility]                              PRIMARY      MEDICINE/INTERN  Next Visit: 01-      CARE ([facility])   [facility]         Gómez Parisi                                                            Last  Test          Frequency    Reason                     Performed    Due Date  --------------------------------------------------------------------------------    HBA1C.......  6 months...  tirzepatide..............  08- 02-    [facility] Embedded Care Due Messages. Reference number: [id number].   12/13/2024 8:02:36 AM CST

## 2024-12-17 ENCOUNTER — OFFICE VISIT (OUTPATIENT)
Dept: FAMILY MEDICINE | Facility: CLINIC | Age: 44
End: 2024-12-17
Payer: COMMERCIAL

## 2024-12-17 VITALS
WEIGHT: 246.25 LBS | DIASTOLIC BLOOD PRESSURE: 68 MMHG | HEIGHT: 68 IN | OXYGEN SATURATION: 96 % | TEMPERATURE: 99 F | SYSTOLIC BLOOD PRESSURE: 110 MMHG | BODY MASS INDEX: 37.32 KG/M2 | HEART RATE: 89 BPM

## 2024-12-17 DIAGNOSIS — F90.9 ATTENTION DEFICIT HYPERACTIVITY DISORDER (ADHD), UNSPECIFIED ADHD TYPE: Primary | ICD-10-CM

## 2024-12-17 PROCEDURE — 99396 PREV VISIT EST AGE 40-64: CPT | Mod: S$GLB,,, | Performed by: FAMILY MEDICINE

## 2024-12-17 PROCEDURE — 3044F HG A1C LEVEL LT 7.0%: CPT | Mod: CPTII,S$GLB,, | Performed by: FAMILY MEDICINE

## 2024-12-17 PROCEDURE — 3008F BODY MASS INDEX DOCD: CPT | Mod: CPTII,S$GLB,, | Performed by: FAMILY MEDICINE

## 2024-12-17 PROCEDURE — 99999 PR PBB SHADOW E&M-EST. PATIENT-LVL III: CPT | Mod: PBBFAC,,, | Performed by: FAMILY MEDICINE

## 2024-12-17 PROCEDURE — 3078F DIAST BP <80 MM HG: CPT | Mod: CPTII,S$GLB,, | Performed by: FAMILY MEDICINE

## 2024-12-17 PROCEDURE — 3074F SYST BP LT 130 MM HG: CPT | Mod: CPTII,S$GLB,, | Performed by: FAMILY MEDICINE

## 2024-12-17 PROCEDURE — 4010F ACE/ARB THERAPY RXD/TAKEN: CPT | Mod: CPTII,S$GLB,, | Performed by: FAMILY MEDICINE

## 2024-12-17 RX ORDER — DEXTROAMPHETAMINE SACCHARATE, AMPHETAMINE ASPARTATE, DEXTROAMPHETAMINE SULFATE AND AMPHETAMINE SULFATE 2.5; 2.5; 2.5; 2.5 MG/1; MG/1; MG/1; MG/1
1 TABLET ORAL 2 TIMES DAILY
Qty: 60 TABLET | Refills: 0 | Status: SHIPPED | OUTPATIENT
Start: 2025-02-13

## 2024-12-17 RX ORDER — DEXTROAMPHETAMINE SACCHARATE, AMPHETAMINE ASPARTATE, DEXTROAMPHETAMINE SULFATE AND AMPHETAMINE SULFATE 2.5; 2.5; 2.5; 2.5 MG/1; MG/1; MG/1; MG/1
1 TABLET ORAL 2 TIMES DAILY
Qty: 60 TABLET | Refills: 0 | Status: SHIPPED | OUTPATIENT
Start: 2025-01-15

## 2024-12-17 RX ORDER — DEXTROAMPHETAMINE SACCHARATE, AMPHETAMINE ASPARTATE, DEXTROAMPHETAMINE SULFATE AND AMPHETAMINE SULFATE 2.5; 2.5; 2.5; 2.5 MG/1; MG/1; MG/1; MG/1
1 TABLET ORAL 2 TIMES DAILY
Qty: 60 TABLET | Refills: 0 | Status: SHIPPED | OUTPATIENT
Start: 2024-12-17

## 2024-12-17 NOTE — PROGRESS NOTES
Chief Complaint   Patient presents with    Transitional Care       SUBJECTIVE:  Garth Tejeda is a 44 y.o. male here for follow up of ADHD.   Patient has ADHD and is well controleld without drug side effects and doing well overall without any unusual symptoms or history.      High risk with CV  But seeing specialist  Still ok  Has to see EP  Bleeding is improved  Off of eliquis       Currently has co-morbidities including below problem list.        Patient Active Problem List    Diagnosis Date Noted    Prediabetes 09/15/2022    Severe obesity (BMI 35.0-39.9) with comorbidity 10/26/2021    Cardiomyopathy, unspecified type 10/26/2021    VALENTÍN (obstructive sleep apnea) 02/19/2021     Patient with symptoms of  Daytime sleepiness ESS 8, long irritated uvula, snoring and fatigue .  PSG 2/1/2021:    The overall AHI was 7.6 with an oxygen tracey of 85.0%. The AHI in REM sleep was 24.9. The central apnea index was 0.2. The supine AHI was 7.2.   Patient is using and benefiting from cpap 4-11 cm H2O. Residual predicted AHI optimal.       Bacterial sinusitis 01/05/2021    Body water dehydration 09/23/2019    Abnormal ECG 02/01/2018     Formatting of this note might be different from the original.  Echocardiogram shows upper normal LV size, normal LV thickness, aortic root borderline increased size, left atrium probably normal in size based on KATARZYNA of 16, EF 50%, diastolic function normal, mild TR.      Mild obesity 11/08/2017    Anxiety about health 02/22/2017     Long standing  Seen by psych, no admissions  effexor was weight gain  Ativan PRN      ADD (attention deficit disorder) 02/22/2017    Hypertension, essential 02/22/2017    Left subclavian artery occlusion 03/28/2016     Secondary to muscle impingement, has collateral flow now, just monitoring      History of congenital heart defect 12/31/2015    Diminished pulse 12/31/2015     LEFT UPPER EXTREMITY         ROS    OBJECTIVE:  /68   Pulse 89   Temp 98.6 °F (37 °C)  "(Oral)   Ht 5' 8" (1.727 m)   Wt 111.7 kg (246 lb 4.1 oz)   SpO2 96%   BMI 37.44 kg/m²     Wt Readings from Last 3 Encounters:   12/17/24 111.7 kg (246 lb 4.1 oz)   11/25/24 90.7 kg (200 lb)   10/01/24 115.6 kg (254 lb 13.6 oz)     BP Readings from Last 3 Encounters:   12/17/24 110/68   11/25/24 96/60   10/01/24 110/78       Patient is in usual state of health, alert and oriented without signs of intoxication.  No evidence on exam of illegal substance use or concerning symptoms involving abuse or intoxication (specifically evidence of IVDU, unusual bruising, slurred speech, unusual explanations).               Review of old Records:  Reviewed per epic and   NARx OD score/stimulant score: reviewed  Review of old labs:    Lab Results   Component Value Date    TSH 3.570 08/26/2024     Lab Results   Component Value Date    WBC 8.67 11/25/2024    HGB 14.7 11/25/2024    HCT 43.4 11/25/2024    MCV 82 11/25/2024     11/25/2024       Chemistry        Component Value Date/Time     11/25/2024 0237    K 4.1 11/25/2024 0237     11/25/2024 0237    CO2 19 (L) 11/25/2024 0237    BUN 13 11/25/2024 0237    CREATININE 0.9 11/25/2024 0237    GLU 96 11/25/2024 0237        Component Value Date/Time    CALCIUM 9.2 11/25/2024 0237    ALKPHOS 98 11/25/2024 0237    AST 16 11/25/2024 0237    ALT 33 11/25/2024 0237    BILITOT 0.4 11/25/2024 0237    ESTGFRAFRICA >60 11/03/2021 0743    EGFRNONAA >60 11/03/2021 0743        Lab Results   Component Value Date    CHOL 137 08/26/2024    CHOL 136 02/07/2024    CHOL 123 11/03/2021     Lab Results   Component Value Date    HDL 43 08/26/2024    HDL 44 02/07/2024    HDL 42 11/03/2021     Lab Results   Component Value Date    LDLCALC 59.6 (L) 08/26/2024    LDLCALC 69.4 02/07/2024    LDLCALC 61.4 (L) 11/03/2021     Lab Results   Component Value Date    TRIG 172 (H) 08/26/2024    TRIG 113 02/07/2024    TRIG 98 11/03/2021     Lab Results   Component Value Date    CHOLHDL 31.4 " 2024    CHOLHDL 32.4 2024    CHOLHDL 34.1 2021         Review of old imaging:  Reviewed last EKG tracing if available.          ASSESSMENT:    ICD-10-CM ICD-9-CM   1. Attention deficit hyperactivity disorder (ADHD), unspecified ADHD type  F90.9 314.01       No evidence of abuse/diversion/addiction noted through history and physical, or checking the .  Risks, benefits and alternate treatments are considered and plan of care reflects that shared decision with the patient.  Went over schedule II responsibilities, including abuse, side effects, refill restrictions, necessary visits, drug testing, protection of medication.  Patient voices understanding.      PLAN:    Problem List Items Addressed This Visit       ADD (attention deficit disorder) - Primary    Relevant Medications    dextroamphetamine-amphetamine 10 mg Tab    dextroamphetamine-amphetamine 10 mg Tab (Start on 1/15/2025)    dextroamphetamine-amphetamine 10 mg Tab (Start on 2025)     F/u with speicalist  See about ablation  Has holter  He is not having afib currently    Medication List with Changes/Refills   New Medications    DEXTROAMPHETAMINE-AMPHETAMINE 10 MG TAB    Take 1 tablet (10 mg total) by mouth 2 (two) times daily.    DEXTROAMPHETAMINE-AMPHETAMINE 10 MG TAB    Take 1 tablet (10 mg total) by mouth 2 (two) times daily.   Current Medications    ALBUTEROL (PROVENTIL/VENTOLIN HFA) 90 MCG/ACTUATION INHALER    Inhale 2 puffs into the lungs every 6 (six) hours as needed for Wheezing.    ASPIRIN (ECOTRIN) 81 MG EC TABLET    Take 81 mg by mouth once daily.    ATORVASTATIN (LIPITOR) 10 MG TABLET    TAKE 1 TABLET BY MOUTH EVERY DAY    DESLORATADINE (CLARINEX) 5 MG TABLET    TAKE 1 TABLET BY MOUTH EVERY DAY    DIAZEPAM (VALIUM) 10 MG TAB    TAKE ONE TABLET BY MOUTH TWICE DAILY    FLUTICASONE PROPIONATE (FLONASE) 50 MCG/ACTUATION NASAL SPRAY    SMARTSI Spray(s) Both Nares Every Evening    HYDROCHLOROTHIAZIDE (HYDRODIURIL) 25 MG TABLET     Take 25 mg by mouth.    LISINOPRIL 10 MG TABLET    Take 1 tablet (10 mg total) by mouth 2 (two) times daily.    ONDANSETRON (ZOFRAN-ODT) 8 MG TBDL    Take 1 tablet (8 mg total) by mouth 2 (two) times daily.    SCOPOLAMINE (TRANSDERM-SCOP) 1.3-1.5 MG (1 MG OVER 3 DAYS)    Place 1 patch onto the skin every 72 hours.    SPIRONOLACTONE (ALDACTONE) 25 MG TABLET    Take 25 mg by mouth.    TESTOSTERONE CYPIONATE (DEPOTESTOTERONE CYPIONATE) 200 MG/ML INJECTION    Inject 1 mL (200 mg total) into the muscle once a week.    TOBRAMYCIN SULFATE 0.3% (TOBREX) 0.3 % OPHTHALMIC SOLUTION    Place 1 drop into both eyes every 4 (four) hours.   Changed and/or Refilled Medications    Modified Medication Previous Medication    DEXTROAMPHETAMINE-AMPHETAMINE 10 MG TAB dextroamphetamine-amphetamine 10 mg Tab       Take 1 tablet (10 mg total) by mouth 2 (two) times daily.    Take 1 tablet (10 mg total) by mouth 2 (two) times daily.   Discontinued Medications    APIXABAN (ELIQUIS) 5 MG TAB    Take 1 tablet (5 mg total) by mouth 2 (two) times daily.    DEXTROAMPHETAMINE-AMPHETAMINE 10 MG TAB    Take 1 tablet (10 mg total) by mouth 2 (two) times daily.    DEXTROAMPHETAMINE-AMPHETAMINE 10 MG TAB    Take 1 tablet (10 mg total) by mouth 2 (two) times daily.    DEXTROAMPHETAMINE-AMPHETAMINE 10 MG TAB    Take 1 tablet (10 mg total) by mouth 2 (two) times daily.    DEXTROAMPHETAMINE-AMPHETAMINE 10 MG TAB    Take 1 tablet (10 mg total) by mouth 2 (two) times daily.    DILTIAZEM (TIAZAC) 240 MG CS24    Take 240 mg by mouth.    METOPROLOL SUCCINATE (TOPROL-XL) 25 MG 24 HR TABLET    Take 0.5 tablets (12.5 mg total) by mouth once daily.    TIRZEPATIDE 10 MG/0.5 ML PNIJ    Inject 10 mg into the skin every 7 days.    TIRZEPATIDE 12.5 MG/0.5 ML PNIJ    Inject 12.5 mg into the skin every 7 days.         Continue with current care unless changes noted below.  Afib  On digoxin  Stimulant is ok  We will watch it.    High risk medication review completed per  guidelines to insure safest use of medication.      We reviewed our goals of care:    Improvement in concentration and mood  Aid focus in completing tasks at work/school  Safety first priority      And discontinuation      Intolerable side effects  Evidence of abuse/misuse or diversion      Future Appointments   Date Time Provider Department Center   1/8/2025  2:00 PM [facility]       3 months or sooner as needed

## 2024-12-23 ENCOUNTER — TELEPHONE (OUTPATIENT)
Dept: FAMILY MEDICINE | Facility: CLINIC | Age: 44
End: 2024-12-23
Payer: COMMERCIAL

## 2024-12-23 DIAGNOSIS — G47.33 OSA (OBSTRUCTIVE SLEEP APNEA): Primary | ICD-10-CM

## 2024-12-23 NOTE — TELEPHONE ENCOUNTER
----- Message from Karen sent at 12/23/2024  2:45 PM CST -----  Regarding: patient call back  Type: Patient Call Back    Who called: Self     What is the request in detail: asked for an order to be placed for CPAP supplies. He was seeing Marley and [facility] is [facility]. I scheduled  he with the soonest apt with Dr. Logan      Can the clinic reply by [facility]? No     Would the patient rather a call back or a response via [facility]? Call     Best call back number: .[phone]      Additional Information:

## 2025-01-08 ENCOUNTER — OFFICE VISIT (OUTPATIENT)
Dept: FAMILY MEDICINE | Facility: CLINIC | Age: 45
End: 2025-01-08
Payer: COMMERCIAL

## 2025-01-08 VITALS
HEIGHT: 70 IN | TEMPERATURE: 99 F | OXYGEN SATURATION: 97 % | BODY MASS INDEX: 35.92 KG/M2 | SYSTOLIC BLOOD PRESSURE: 112 MMHG | HEART RATE: 107 BPM | DIASTOLIC BLOOD PRESSURE: 70 MMHG | WEIGHT: 250.88 LBS

## 2025-01-08 DIAGNOSIS — J06.9 UPPER RESPIRATORY TRACT INFECTION, UNSPECIFIED TYPE: ICD-10-CM

## 2025-01-08 DIAGNOSIS — I42.9 CARDIOMYOPATHY, UNSPECIFIED TYPE: ICD-10-CM

## 2025-01-08 DIAGNOSIS — E66.01 SEVERE OBESITY: ICD-10-CM

## 2025-01-08 DIAGNOSIS — J30.9 ALLERGIC RHINITIS, UNSPECIFIED SEASONALITY, UNSPECIFIED TRIGGER: ICD-10-CM

## 2025-01-08 DIAGNOSIS — G47.33 OSA (OBSTRUCTIVE SLEEP APNEA): ICD-10-CM

## 2025-01-08 DIAGNOSIS — Z00.00 ANNUAL PHYSICAL EXAM: Primary | ICD-10-CM

## 2025-01-08 PROCEDURE — 99999 PR PBB SHADOW E&M-EST. PATIENT-LVL III: CPT | Mod: PBBFAC,,, | Performed by: FAMILY MEDICINE

## 2025-01-08 RX ORDER — DESLORATADINE 5 MG/1
5 TABLET ORAL DAILY
Qty: 90 TABLET | Refills: 3 | Status: SHIPPED | OUTPATIENT
Start: 2025-01-08

## 2025-01-08 RX ORDER — ALBUTEROL SULFATE 90 UG/1
2 INHALANT RESPIRATORY (INHALATION) EVERY 6 HOURS PRN
Qty: 18 G | Refills: 11 | Status: SHIPPED | OUTPATIENT
Start: 2025-01-08

## 2025-01-08 NOTE — PROGRESS NOTES
Chief Complaint   Patient presents with    Medication Refill       SUBJECTIVE:   Garth Tejeda is a 44 y.o. male presenting for his annual checkup.   Current Outpatient Medications   Medication Sig Dispense Refill    aspirin (ECOTRIN) 81 MG EC tablet Take 81 mg by mouth once daily.      dextroamphetamine-amphetamine 10 mg Tab Take 1 tablet (10 mg total) by mouth 2 (two) times daily. 60 tablet 0    [START ON 1/15/2025] dextroamphetamine-amphetamine 10 mg Tab Take 1 tablet (10 mg total) by mouth 2 (two) times daily. 60 tablet 0    [START ON 2025] dextroamphetamine-amphetamine 10 mg Tab Take 1 tablet (10 mg total) by mouth 2 (two) times daily. 60 tablet 0    diazePAM (VALIUM) 10 MG Tab TAKE ONE TABLET BY MOUTH TWICE DAILY 60 tablet 2    fluticasone propionate (FLONASE) 50 mcg/actuation nasal spray SMARTSI Spray(s) Both Nares Every Evening      lisinopriL 10 MG tablet Take 1 tablet (10 mg total) by mouth 2 (two) times daily. 180 tablet 3    ondansetron (ZOFRAN-ODT) 8 MG TbDL Take 1 tablet (8 mg total) by mouth 2 (two) times daily. 20 tablet 11    testosterone cypionate (DEPOTESTOTERONE CYPIONATE) 200 mg/mL injection Inject 1 mL (200 mg total) into the muscle once a week. 10 mL 2    albuterol (PROVENTIL/VENTOLIN HFA) 90 mcg/actuation inhaler Inhale 2 puffs into the lungs every 6 (six) hours as needed for Wheezing. 18 g 11    desloratadine (CLARINEX) 5 mg tablet Take 1 tablet (5 mg total) by mouth once daily. 90 tablet 3    hydroCHLOROthiazide (HYDRODIURIL) 25 MG tablet Take 25 mg by mouth.      spironolactone (ALDACTONE) 25 MG tablet Take 25 mg by mouth.       No current facility-administered medications for this visit.     Allergies: Patient has no known allergies.   Patient Active Problem List    Diagnosis Date Noted    Prediabetes 09/15/2022    Severe obesity (BMI 35.0-39.9) with comorbidity 10/26/2021    Cardiomyopathy, unspecified type 10/26/2021    VALENTÍN (obstructive sleep apnea) 2021    Bacterial  "sinusitis 01/05/2021    Body water dehydration 09/23/2019    Abnormal ECG 02/01/2018    Mild obesity 11/08/2017    Anxiety about health 02/22/2017    ADD (attention deficit disorder) 02/22/2017    Hypertension, essential 02/22/2017    Left subclavian artery occlusion 03/28/2016    History of congenital heart defect 12/31/2015    Diminished pulse 12/31/2015       ROS:  Feeling well. No dyspnea or chest pain on exertion. No abdominal pain, change in bowel habits, black or bloody stools. No urinary tract or prostatic symptoms. No neurological complaints.    OBJECTIVE:   The patient appears well, alert, oriented x 3, in no distress.   /70   Pulse 107   Temp 98.9 °F (37.2 °C) (Oral)   Ht 5' 10" (1.778 m)   Wt 113.8 kg (250 lb 14.1 oz)   SpO2 97%   BMI 36.00 kg/m²   Wt Readings from Last 5 Encounters:   01/08/25 113.8 kg (250 lb 14.1 oz)   12/17/24 111.7 kg (246 lb 4.1 oz)   11/25/24 90.7 kg (200 lb)   10/01/24 115.6 kg (254 lb 13.6 oz)   05/22/24 116 kg (255 lb 11.7 oz)       ENT normal.  Neck supple. No adenopathy or thyromegaly. DEANNA. Lungs are clear, good air entry, no wheezes, rhonchi or rales. S1 and S2 normal, no murmurs, regular rate and rhythm. Abdomen is soft without tenderness, guarding, mass or organomegaly.  exam: deferred.  Extremities show no edema, normal peripheral pulses. Neurological is normal without focal findings.    ASSESSMENT:   1. Annual physical exam    2. Upper respiratory tract infection, unspecified type    3. Allergic rhinitis, unspecified seasonality, unspecified trigger    4. VALENTÍN (obstructive sleep apnea)    5. Severe obesity    6. Cardiomyopathy, unspecified type          PLAN:   Counseled on age appropriate medical preventative services, including age appropriate cancer screenings, over all nutritional health, need for a consistent exercise regimen and an over all push towards maintaining a vigorous and active lifestyle.  Counseled on age appropriate vaccines and discussed " upcoming health care needs based on age/gender.  Spent time with patient counseling on need for a good patient/doctor relationship moving forward.  Discussed use of common OTC medications and supplements.  Discussed common dietary aids and use of caffeine and the need for good sleep hygiene and stress management.    Problem List Items Addressed This Visit       VALENTÍN (obstructive sleep apnea)    Overview     Patient with symptoms of  Daytime sleepiness ESS 8, long irritated uvula, snoring and fatigue .  PSG 2/1/2021:    The overall AHI was 7.6 with an oxygen tracey of 85.0%. The AHI in REM sleep was 24.9. The central apnea index was 0.2. The supine AHI was 7.2.   Patient is using and benefiting from cpap 4-11 cm H2O. Residual predicted AHI optimal.          Current Assessment & Plan     Compliant and doing well  Needs 6 month supplies.         Relevant Orders    CPAP/BIPAP SUPPLIES    Severe obesity (BMI 35.0-39.9) with comorbidity    Current Assessment & Plan     The patient is asked to make an attempt to improve diet and exercise patterns to aid in medical management of this problem.           Cardiomyopathy, unspecified type    Current Assessment & Plan     Digoxin for now  Ablation for now          Other Visit Diagnoses       Annual physical exam    -  Primary    Upper respiratory tract infection, unspecified type        Relevant Medications    albuterol (PROVENTIL/VENTOLIN HFA) 90 mcg/actuation inhaler    desloratadine (CLARINEX) 5 mg tablet    Allergic rhinitis, unspecified seasonality, unspecified trigger        Relevant Medications    desloratadine (CLARINEX) 5 mg tablet

## 2025-01-13 ENCOUNTER — TELEPHONE (OUTPATIENT)
Dept: PHARMACY | Facility: CLINIC | Age: 45
End: 2025-01-13
Payer: COMMERCIAL

## 2025-01-13 NOTE — TELEPHONE ENCOUNTER
[facility] Chart Review & Patient Outreach Details For Medication Adherence Project    Reason for Outreach Encounter: 3rd Party payor non-compliance report ([employer], [employer], [employer], etc)  2.  Patient Outreach Method: Reviewed patient chart   3.   Medication in question:    Hypertension Medications               hydroCHLOROthiazide (HYDRODIURIL) 25 MG tablet Take 25 mg by mouth.    lisinopriL 10 MG tablet Take 1 tablet (10 mg total) by mouth 2 (two) times daily.    spironolactone (ALDACTONE) 25 MG tablet Take 25 mg by mouth.                 lisinopril  last filled  12/20 for 30 day supply      4.  Reviewed and or Updates Made To: Patient Chart  5. Outreach Outcomes and/or actions taken: Patient filled medication and is on track to be adherent  Additional Notes:

## 2025-02-08 ENCOUNTER — PATIENT MESSAGE (OUTPATIENT)
Dept: FAMILY MEDICINE | Facility: CLINIC | Age: 45
End: 2025-02-08
Payer: COMMERCIAL

## 2025-02-08 DIAGNOSIS — I42.9 CARDIOMYOPATHY, UNSPECIFIED TYPE: ICD-10-CM

## 2025-02-08 DIAGNOSIS — Z00.00 ANNUAL PHYSICAL EXAM: Primary | ICD-10-CM

## 2025-02-08 DIAGNOSIS — R79.89 LOW TESTOSTERONE IN MALE: ICD-10-CM

## 2025-02-24 ENCOUNTER — TELEPHONE (OUTPATIENT)
Dept: PHARMACY | Facility: CLINIC | Age: 45
End: 2025-02-24
Payer: COMMERCIAL

## 2025-02-24 NOTE — TELEPHONE ENCOUNTER
[facility] Chart Review & Patient Outreach Details For Medication Adherence Project    Reason for Outreach Encounter: 3rd Party payor non-compliance report ([employer], [employer], C, etc)  2.  Patient Outreach Method: Reviewed Patient Chart  3.   Medication in question: lisinopril   LAST FILLED: 2/21/25 for 30 day supply  Hypertension Medications              hydroCHLOROthiazide (HYDRODIURIL) 25 MG tablet Take 25 mg by mouth.    lisinopriL 10 MG tablet Take 1 tablet (10 mg total) by mouth 2 (two) times daily.    spironolactone (ALDACTONE) 25 MG tablet Take 25 mg by mouth.              4.  Reviewed and or Updates Made To: Patient Chart  5. Outreach Outcomes and/or actions taken: Patient filled medication and is on track to be adherent

## 2025-02-27 ENCOUNTER — PATIENT MESSAGE (OUTPATIENT)
Dept: FAMILY MEDICINE | Facility: CLINIC | Age: 45
End: 2025-02-27
Payer: COMMERCIAL

## 2025-03-15 ENCOUNTER — TELEPHONE (OUTPATIENT)
Dept: PHARMACY | Facility: CLINIC | Age: 45
End: 2025-03-15
Payer: COMMERCIAL

## 2025-03-15 NOTE — TELEPHONE ENCOUNTER
[facility] Chart Review & Patient Outreach Details For Medication Adherence Project    Reason for Outreach Encounter: 3rd Party payor non-compliance report ([employer], [employer], [employer], etc)  2.  Patient Outreach Method: Reviewed patient chart   3.   Medication in question:    Hypertension Medications              hydroCHLOROthiazide (HYDRODIURIL) 25 MG tablet Take 25 mg by mouth.    lisinopriL 10 MG tablet Take 1 tablet (10 mg total) by mouth 2 (two) times daily.    spironolactone (ALDACTONE) 25 MG tablet Take 25 mg by mouth.                 lisinopril  last filled  2/21 for 30 day supply      4.  Reviewed and or Updates Made To: Patient Chart  5. Outreach Outcomes and/or actions taken: Patient filled medication and is on track to be adherent  Additional Notes:

## 2025-03-18 DIAGNOSIS — F41.9 ANXIETY: ICD-10-CM

## 2025-03-18 RX ORDER — DIAZEPAM 10 MG/1
10 TABLET ORAL 2 TIMES DAILY
Qty: 60 TABLET | Refills: 2 | Status: SHIPPED | OUTPATIENT
Start: 2025-03-18

## 2025-03-18 NOTE — TELEPHONE ENCOUNTER
No care due was identified.  [facility] Embedded Care Due Messages. Reference number: [id number].   3/18/2025 8:02:06 AM CDT

## 2025-04-18 ENCOUNTER — TELEPHONE (OUTPATIENT)
Dept: PHARMACY | Facility: CLINIC | Age: 45
End: 2025-04-18
Payer: COMMERCIAL

## 2025-04-18 NOTE — TELEPHONE ENCOUNTER
[facility] Chart Review & Patient Outreach Details For Medication Adherence Project    Reason for Outreach Encounter: 3rd Party payor non-compliance report ([employer], [employer], [employer], etc)  2.  Patient Outreach Method: Reviewed patient chart   3.   Medication in question:    Hypertension Medications              hydroCHLOROthiazide (HYDRODIURIL) 25 MG tablet Take 25 mg by mouth.    lisinopriL 10 MG tablet Take 1 tablet (10 mg total) by mouth 2 (two) times daily.    spironolactone (ALDACTONE) 25 MG tablet Take 25 mg by mouth.                 lisinopril  last filled  3/31 for 30 day supply      4.  Reviewed and or Updates Made To: Patient Chart  5. Outreach Outcomes and/or actions taken: Patient filled medication and is on track to be adherent  Additional Notes:

## 2025-04-21 DIAGNOSIS — R79.89 LOW TESTOSTERONE IN MALE: ICD-10-CM

## 2025-04-21 NOTE — TELEPHONE ENCOUNTER
No care due was identified.  [facility] Embedded Care Due Messages. Reference number: [id number].   4/21/2025 12:10:47 PM CDT

## 2025-04-22 RX ORDER — TESTOSTERONE CYPIONATE 200 MG/ML
INJECTION, SOLUTION INTRAMUSCULAR
Qty: 10 ML | Refills: 2 | Status: SHIPPED | OUTPATIENT
Start: 2025-04-22

## 2025-04-28 ENCOUNTER — TELEPHONE (OUTPATIENT)
Dept: FAMILY MEDICINE | Facility: CLINIC | Age: 45
End: 2025-04-28
Payer: COMMERCIAL

## 2025-04-28 NOTE — TELEPHONE ENCOUNTER
Yann Dasilva Staff  Caller: Unspecified (Today,  4:19 PM)    Type: Patient Call Back    Who called:[facility] [employer]-    What is the request in detail:Checking on cpap supply orders that were faxed on April 17th. REF # [id number] Fax # [phone]    Can the clinic reply by [facility]? No    Would the patient rather a call back or a response via [facility]? Call back    Best call back number:[phone]    Additional Information:    Thank you.

## 2025-04-30 ENCOUNTER — TELEPHONE (OUTPATIENT)
Dept: FAMILY MEDICINE | Facility: CLINIC | Age: 45
End: 2025-04-30
Payer: COMMERCIAL

## 2025-04-30 NOTE — TELEPHONE ENCOUNTER
----- Message from Gale sent at 4/30/2025 12:46 PM CDT -----  .Type: Patient Call BackWho called: Neeta nguyen/ [facility]What is the request in detail:Following up on prescription request for CPAP Supplies. Reference No. [id number] the clinic reply by [facility]? No Would the patient rather a call back or a response via [facility]? Call [facility] call back number:[phone] (office) [phone] (fax)Additional Information:

## 2025-04-30 NOTE — TELEPHONE ENCOUNTER
Called DME, no answer left message to callback. Efaxed and manually faxed order and progress notes

## 2025-05-02 ENCOUNTER — TELEPHONE (OUTPATIENT)
Dept: FAMILY MEDICINE | Facility: CLINIC | Age: 45
End: 2025-05-02
Payer: COMMERCIAL

## 2025-05-02 NOTE — TELEPHONE ENCOUNTER
----- Message from Tracy sent at 5/2/2025  1:24 PM CDT -----  Type: Patient Call Back Who called: [facility]  What is the request in detail: confirming to see if pt's request for home health supplies was received.  Can the clinic reply by [facility]? Would the patient rather a call back or a response via [facility]?  Best call back number: fax: [phone] phone [phone] ref#[id number] Additional Information:

## 2025-05-02 NOTE — TELEPHONE ENCOUNTER
Called [facility] to verify if they received the orders for CPAP supplies.   Representative states to fax to [phone]. Refaxed orders and progress note

## 2025-05-09 DIAGNOSIS — G47.33 OSA (OBSTRUCTIVE SLEEP APNEA): Primary | ICD-10-CM

## 2025-05-13 ENCOUNTER — TELEPHONE (OUTPATIENT)
Dept: FAMILY MEDICINE | Facility: CLINIC | Age: 45
End: 2025-05-13
Payer: COMMERCIAL

## 2025-05-13 DIAGNOSIS — G47.33 OSA (OBSTRUCTIVE SLEEP APNEA): Primary | ICD-10-CM

## 2025-05-14 ENCOUNTER — TELEPHONE (OUTPATIENT)
Dept: FAMILY MEDICINE | Facility: CLINIC | Age: 45
End: 2025-05-14
Payer: COMMERCIAL

## 2025-05-14 NOTE — TELEPHONE ENCOUNTER
----- Message from Emirmichele sent at 5/14/2025  8:50 AM CDT -----  Regarding: [facility]   Type: Pharmacy Calling to Clarify an RX Name of Caller: [facility]  Prescription Name:  C- pap supplies  ref# [id number] What do they need to clarify?  If the request was received for office notes from 01/08/25 Can you be contacted via [facility]? Call back  Best Call Back Number:  [phone]     Fax# [phone]

## 2025-05-17 ENCOUNTER — TELEPHONE (OUTPATIENT)
Dept: PHARMACY | Facility: CLINIC | Age: 45
End: 2025-05-17
Payer: COMMERCIAL

## 2025-05-17 NOTE — TELEPHONE ENCOUNTER
[facility] Chart Review & Patient Outreach Details For Medication Adherence Project    Reason for Outreach Encounter: 3rd Party payor non-compliance report ([employer], [employer], C, etc)  2.  Patient Outreach Method: Reviewed Patient Chart  3.   Medication in question: lisinopril    LAST FILLED: 4/28/25 for 30 day supply  Hypertension Medications              hydroCHLOROthiazide (HYDRODIURIL) 25 MG tablet Take 25 mg by mouth.    lisinopriL 10 MG tablet Take 1 tablet (10 mg total) by mouth 2 (two) times daily.    spironolactone (ALDACTONE) 25 MG tablet Take 25 mg by mouth.              4.  Reviewed and or Updates Made To: Patient Chart  5. Outreach Outcomes and/or actions taken: Patient filled medication and is on track to be adherent

## 2025-06-22 ENCOUNTER — HOSPITAL ENCOUNTER (EMERGENCY)
Facility: HOSPITAL | Age: 45
Discharge: HOME | End: 2025-06-22
Attending: STUDENT IN AN ORGANIZED HEALTH CARE EDUCATION/TRAINING PROGRAM
Payer: COMMERCIAL

## 2025-06-22 VITALS
RESPIRATION RATE: 17 BRPM | WEIGHT: 239 LBS | SYSTOLIC BLOOD PRESSURE: 136 MMHG | HEIGHT: 70 IN | BODY MASS INDEX: 34.22 KG/M2 | HEART RATE: 86 BPM | TEMPERATURE: 98 F | OXYGEN SATURATION: 97 % | DIASTOLIC BLOOD PRESSURE: 77 MMHG

## 2025-06-22 DIAGNOSIS — R00.0 TACHYCARDIA: ICD-10-CM

## 2025-06-22 DIAGNOSIS — R04.2 HEMOPTYSIS: Primary | ICD-10-CM

## 2025-06-22 LAB
ABSOLUTE EOSINOPHIL (OHS): 0.06 K/UL
ABSOLUTE MONOCYTE (OHS): 0.65 K/UL (ref 0.3–1)
ABSOLUTE NEUTROPHIL COUNT (OHS): 4.58 K/UL (ref 1.8–7.7)
ALBUMIN SERPL BCP-MCNC: 4.2 G/DL (ref 3.5–5.2)
ALP SERPL-CCNC: 91 UNIT/L (ref 40–150)
ALT SERPL W/O P-5'-P-CCNC: 19 UNIT/L (ref 10–44)
ANION GAP (OHS): 11 MMOL/L (ref 8–16)
AST SERPL-CCNC: 15 UNIT/L (ref 11–45)
BASOPHILS # BLD AUTO: 0.05 K/UL
BASOPHILS NFR BLD AUTO: 0.7 %
BILIRUB SERPL-MCNC: 0.5 MG/DL (ref 0.1–1)
BNP SERPL-MCNC: <10 PG/ML (ref 0–99)
BUN SERPL-MCNC: 10 MG/DL (ref 6–20)
CALCIUM SERPL-MCNC: 9.6 MG/DL (ref 8.7–10.5)
CHLORIDE SERPL-SCNC: 105 MMOL/L (ref 95–110)
CO2 SERPL-SCNC: 24 MMOL/L (ref 23–29)
CREAT SERPL-MCNC: 1.1 MG/DL (ref 0.5–1.4)
D DIMER PPP IA.FEU-MCNC: 0.22 MG/L FEU
ERYTHROCYTE [DISTWIDTH] IN BLOOD BY AUTOMATED COUNT: 15.7 % (ref 11.5–14.5)
GFR SERPLBLD CREATININE-BSD FMLA CKD-EPI: >60 ML/MIN/1.73/M2
GLUCOSE SERPL-MCNC: 86 MG/DL (ref 70–110)
HCT VFR BLD AUTO: 45.1 % (ref 40–54)
HGB BLD-MCNC: 14.8 GM/DL (ref 14–18)
IMM GRANULOCYTES # BLD AUTO: 0.01 K/UL (ref 0–0.04)
IMM GRANULOCYTES NFR BLD AUTO: 0.1 % (ref 0–0.5)
LYMPHOCYTES # BLD AUTO: 1.7 K/UL (ref 1–4.8)
MCH RBC QN AUTO: 26.9 PG (ref 27–31)
MCHC RBC AUTO-ENTMCNC: 32.8 G/DL (ref 32–36)
MCV RBC AUTO: 82 FL (ref 82–98)
NUCLEATED RBC (/100WBC) (OHS): 0 /100 WBC
PLATELET # BLD AUTO: 254 K/UL (ref 150–450)
PMV BLD AUTO: 9 FL (ref 9.2–12.9)
POTASSIUM SERPL-SCNC: 4.1 MMOL/L (ref 3.5–5.1)
PROT SERPL-MCNC: 8.1 GM/DL (ref 6–8.4)
RBC # BLD AUTO: 5.5 M/UL (ref 4.6–6.2)
RELATIVE EOSINOPHIL (OHS): 0.9 %
RELATIVE LYMPHOCYTE (OHS): 24.1 % (ref 18–48)
RELATIVE MONOCYTE (OHS): 9.2 % (ref 4–15)
RELATIVE NEUTROPHIL (OHS): 65 % (ref 38–73)
SODIUM SERPL-SCNC: 140 MMOL/L (ref 136–145)
TROPONIN I SERPL DL<=0.01 NG/ML-MCNC: <0.006 NG/ML
WBC # BLD AUTO: 7.05 K/UL (ref 3.9–12.7)

## 2025-06-22 PROCEDURE — 80053 COMPREHEN METABOLIC PANEL: CPT | Performed by: STUDENT IN AN ORGANIZED HEALTH CARE EDUCATION/TRAINING PROGRAM

## 2025-06-22 PROCEDURE — 93005 ELECTROCARDIOGRAM TRACING: CPT

## 2025-06-22 PROCEDURE — 99285 EMERGENCY DEPT VISIT HI MDM: CPT | Mod: 25

## 2025-06-22 PROCEDURE — 83880 ASSAY OF NATRIURETIC PEPTIDE: CPT | Performed by: STUDENT IN AN ORGANIZED HEALTH CARE EDUCATION/TRAINING PROGRAM

## 2025-06-22 PROCEDURE — 85025 COMPLETE CBC W/AUTO DIFF WBC: CPT | Performed by: STUDENT IN AN ORGANIZED HEALTH CARE EDUCATION/TRAINING PROGRAM

## 2025-06-22 PROCEDURE — 84484 ASSAY OF TROPONIN QUANT: CPT | Performed by: STUDENT IN AN ORGANIZED HEALTH CARE EDUCATION/TRAINING PROGRAM

## 2025-06-22 PROCEDURE — 85379 FIBRIN DEGRADATION QUANT: CPT | Performed by: STUDENT IN AN ORGANIZED HEALTH CARE EDUCATION/TRAINING PROGRAM

## 2025-06-22 PROCEDURE — 93010 ELECTROCARDIOGRAM REPORT: CPT | Mod: ,,, | Performed by: INTERNAL MEDICINE

## 2025-06-23 ENCOUNTER — CLINICAL SUPPORT (OUTPATIENT)
Dept: FAMILY MEDICINE | Facility: CLINIC | Age: 45
End: 2025-06-23
Payer: COMMERCIAL

## 2025-06-23 DIAGNOSIS — R04.2 COUGHING BLOOD: ICD-10-CM

## 2025-06-23 DIAGNOSIS — J02.9 PHARYNGITIS, UNSPECIFIED ETIOLOGY: Primary | ICD-10-CM

## 2025-06-23 LAB
OHS QRS DURATION: 104 MS
OHS QTC CALCULATION: 417 MS

## 2025-06-23 PROCEDURE — 99999 PR PBB SHADOW E&M-EST. PATIENT-LVL I: CPT | Mod: PBBFAC,,, | Performed by: FAMILY MEDICINE

## 2025-06-23 PROCEDURE — G2211 COMPLEX E/M VISIT ADD ON: HCPCS | Mod: S$GLB,,, | Performed by: FAMILY MEDICINE

## 2025-06-23 PROCEDURE — 99214 OFFICE O/P EST MOD 30 MIN: CPT | Mod: S$GLB,,, | Performed by: FAMILY MEDICINE

## 2025-06-23 RX ORDER — DOXYCYCLINE HYCLATE 100 MG
100 TABLET ORAL 2 TIMES DAILY
Qty: 28 TABLET | Refills: 0 | Status: SHIPPED | OUTPATIENT
Start: 2025-06-23

## 2025-06-23 NOTE — DISCHARGE INSTRUCTIONS
Thank you for coming to our Emergency Department today. It is important to remember that some problems are difficult to diagnose and may not be found during your first visit. Be sure to follow up with your primary care doctor and review any labs/imaging that was performed with them. If you do not have a primary care doctor, you may contact the one listed on your discharge paperwork or you may also call the [facility] Appointment Desk at [phone] to schedule an appointment with one.     All medications may potentially have side effects and it is impossible to predict which medications may give you side effects. If you feel that you are having a negative effect of any medication you should immediately stop taking them and seek medical attention.    Return to the ER with any questions/concerns, new/concerning symptoms, worsening or failure to improve. Do not drive or make any important decisions for 24 hours if you have received any pain medications, sedatives or mood altering drugs during your ER visit.

## 2025-06-23 NOTE — PROGRESS NOTES
HISTORY OF PRESENT ILLNESS:  Garth Tejeda is a 44 y.o. male who presents to the clinic today for No chief complaint on file.  .     New onset coughing blood  Improving, but still some this morning  No fever, has throat irritation.  Has h/o MW tear  Since no drinking and off blood thinners, no new issue        Problem List[1]        CARE TEAM:  Patient Care Team:  Gómez Parisi MD as PCP - General (Family Medicine)  Oscar Hilton MD as Consulting Physician (Cardiology)  Asya Barragan LPN as Care Coordinator         ROS        PHYSICAL EXAM:  There were no vitals taken for this visit.  Wt Readings from Last 5 Encounters:   06/22/25 108.4 kg (239 lb)   01/08/25 113.8 kg (250 lb 14.1 oz)   12/17/24 111.7 kg (246 lb 4.1 oz)   11/25/24 90.7 kg (200 lb)   10/01/24 115.6 kg (254 lb 13.6 oz)     BP Readings from Last 5 Encounters:   06/22/25 136/77   01/08/25 112/70   12/17/24 110/68   11/25/24 96/60   10/01/24 110/78           Normal exam today  Reviewed pictures.  Reviewed ER visit        Medication List with Changes/Refills   New Medications    DOXYCYCLINE (VIBRA-TABS) 100 MG TABLET    Take 1 tablet (100 mg total) by mouth 2 (two) times daily.   Current Medications    ALBUTEROL (PROVENTIL/VENTOLIN HFA) 90 MCG/ACTUATION INHALER    Inhale 2 puffs into the lungs every 6 (six) hours as needed for Wheezing.    ASPIRIN (ECOTRIN) 81 MG EC TABLET    Take 81 mg by mouth once daily.    DESLORATADINE (CLARINEX) 5 MG TABLET    Take 1 tablet (5 mg total) by mouth once daily.    DEXTROAMPHETAMINE-AMPHETAMINE 10 MG TAB    Take 1 tablet (10 mg total) by mouth 2 (two) times daily.    DEXTROAMPHETAMINE-AMPHETAMINE 10 MG TAB    Take 1 tablet (10 mg total) by mouth 2 (two) times daily.    DEXTROAMPHETAMINE-AMPHETAMINE 10 MG TAB    Take 1 tablet (10 mg total) by mouth 2 (two) times daily.    DIAZEPAM (VALIUM) 10 MG TAB    TAKE ONE TABLET BY MOUTH TWICE DAILY    FLUTICASONE PROPIONATE (FLONASE) 50 MCG/ACTUATION NASAL SPRAY     SMARTSI Spray(s) Both Nares Every Evening    HYDROCHLOROTHIAZIDE (HYDRODIURIL) 25 MG TABLET    Take 25 mg by mouth.    LISINOPRIL 10 MG TABLET    Take 1 tablet (10 mg total) by mouth 2 (two) times daily.    ONDANSETRON (ZOFRAN-ODT) 8 MG TBDL    Take 1 tablet (8 mg total) by mouth 2 (two) times daily.    SPIRONOLACTONE (ALDACTONE) 25 MG TABLET    Take 25 mg by mouth.    TESTOSTERONE CYPIONATE (DEPOTESTOTERONE CYPIONATE) 200 MG/ML INJECTION    INJECT 1 ML INTRAMUSCULARLY ONCE A WEEK       ASSESSMENT AND PLAN:    Problem List Items Addressed This Visit    None  Visit Diagnoses         Pharyngitis, unspecified etiology    -  Primary    Relevant Medications    doxycycline (VIBRA-TABS) 100 MG tablet      Coughing blood        Relevant Orders    Ambulatory referral/consult to ENT          Treat sinuses  Use humidifier with VALENTÍN  Get him to ENT for scope to see first if it is up high  He feels it more in the throat    No future appointments.    No follow-ups on file. or sooner as needed.               [1]   Patient Active Problem List  Diagnosis    History of congenital heart defect    Diminished pulse    Left subclavian artery occlusion    Anxiety about health    ADD (attention deficit disorder)    Hypertension, essential    Mild obesity    Body water dehydration    Bacterial sinusitis    VALENTÍN (obstructive sleep apnea)    Severe obesity (BMI 35.0-39.9) with comorbidity    Cardiomyopathy, unspecified type    Prediabetes    Abnormal ECG

## 2025-06-23 NOTE — ED PROVIDER NOTES
Encounter Date: 6/22/2025       History     Chief Complaint   Patient presents with    Hypertension     Pt arrive to ED c/o HTN and multiple episodes of coughing up blood since Friday. Pt reports this has happened in the past and was dx with A Fib and was prescribed blood thinners by cardiologist.      44 y.o. male who has a past medical history of Anxiety, Hypertension, and atrial Septal defect status post repair, complicated by paroxysmal AFib status post ablation. presents to the emergency department due to  hemoptysis that has been ongoing for the past 3 days.  Patient reports 2 days ago after a coughing fit noting blood streaked sputum.  He did not think much of it at that time.  He had recurrence of the episode around 2:00 p.m. today at which time he went to urgent care who recommended he come to the emergency department for evaluation.  Patient was not feeling unwell and symptoms resolved so went to dinner with his wife and after having ice cream symptoms recurred so came to the emergency room for evaluation.  He reports feeling significantly nervous so noted his heart rate and blood pressure were elevated as well.  He does report using a CPAP machine at night and has stopped using a humidifier as he gets postnasal drip which interrupts his sleep.  He denies any recent cough or congestion or sick contacts.  Denies any nausea or vomiting.  He reports currently only being on baby aspirin.  He reports having mild shortness of breath and chest tightness and he is uncertain if his due to his anxiety.     The history is provided by the patient.     Review of patient's allergies indicates:  No Known Allergies  Past Medical History:   Diagnosis Date    Anxiety     Hypertension     Septal defect      Past Surgical History:   Procedure Laterality Date    CARDIAC SURGERY      WRIST SURGERY      right     Family History   Problem Relation Name Age of Onset    Diabetes Mother      Hypertension Mother       Social  History[1]  Review of Systems   Constitutional:  Negative for fever.   HENT:  Negative for sore throat.    Respiratory:  Positive for cough, chest tightness and shortness of breath.    Cardiovascular:  Negative for chest pain.   Gastrointestinal:  Negative for nausea.   Genitourinary:  Negative for dysuria.   Musculoskeletal:  Negative for back pain.   Skin:  Negative for rash.   Neurological:  Negative for weakness.   Hematological:  Does not bruise/bleed easily.       Physical Exam     Initial Vitals [06/22/25 2202]   BP Pulse Resp Temp SpO2   (!) 156/79 99 18 97.8 °F (36.6 °C) 95 %      MAP       --         Physical Exam    Constitutional: He is not diaphoretic. No distress.   HENT:   Head: Normocephalic and atraumatic.   Eyes: Conjunctivae and EOM are normal. Pupils are equal, round, and reactive to light.   Neck:   Normal range of motion.  Cardiovascular:  Regular rhythm.           Pulses:       Radial pulses are 2+ on the right side and 2+ on the left side.        Posterior tibial pulses are 2+ on the right side and 2+ on the left side.   Pulmonary/Chest: Breath sounds normal. No respiratory distress.   Abdominal: Abdomen is soft. Bowel sounds are normal. He exhibits no distension. There is no abdominal tenderness.   Musculoskeletal:         General: No tenderness. Normal range of motion.      Cervical back: Normal range of motion.     Neurological: He is alert and oriented to person, place, and time.   Skin: Skin is warm. Capillary refill takes less than 2 seconds.   Psychiatric: His behavior is normal.         ED Course   Procedures  Labs Reviewed   CBC WITH DIFFERENTIAL - Abnormal       Result Value    WBC 7.05      RBC 5.50      HGB 14.8      HCT 45.1      MCV 82      MCH 26.9 (*)     MCHC 32.8      RDW 15.7 (*)     Platelet Count 254      MPV 9.0 (*)     Nucleated RBC 0      Neut % 65.0      Lymph % 24.1      Mono % 9.2      Eos % 0.9      Basophil % 0.7      Imm Grans % 0.1      Neut # 4.58      Lymph #  1.70      Mono # 0.65      Eos # 0.06      Baso # 0.05      Imm Grans # 0.01     COMPREHENSIVE METABOLIC PANEL - Normal    Sodium 140      Potassium 4.1      Chloride 105      CO2 24      Glucose 86      BUN 10      Creatinine 1.1      Calcium 9.6      Protein Total 8.1      Albumin 4.2      Bilirubin Total 0.5      ALP 91      AST 15      ALT 19      Anion Gap 11      eGFR >60     TROPONIN I - Normal    Troponin-I <0.006     B-TYPE NATRIURETIC PEPTIDE - Normal    BNP <10     D DIMER, QUANTITATIVE - Normal    D-Dimer 0.22     CBC W/ AUTO DIFFERENTIAL    Narrative:     The following orders were created for panel order CBC Auto Differential.  Procedure                               Abnormality         Status                     ---------                               -----------         ------                     CBC with Differential[[id number]]       Abnormal            Final result                 Please view results for these tests on the individual orders.          Imaging Results              X-Ray Chest PA And Lateral (Final result)  Result time 06/22/25 23:04:43      Final result by Donny Alegria MD (06/22/25 23:04:43)                   Impression:      No acute cardiopulmonary process.    Elevated left hemidiaphragm with subsegmental atelectatic change left base.      Electronically signed by: Donny Alegria MD  Date:    06/22/2025  Time:    23:04               Narrative:    EXAMINATION:  XR CHEST PA AND LATERAL    CLINICAL HISTORY:  Hemoptysis    TECHNIQUE:  PA and lateral views of the chest were performed.    COMPARISON:  11/25/2024.    FINDINGS:  Elevated left hemidiaphragm with subsegmental atelectatic change left base.    There is no lobar consolidation, pleural effusion, or pneumothorax.    Cardiomediastinal silhouette is unremarkable.    Regional osseous structures are similar to prior.                                       Medications - No data to display  Medical Decision Making:   Initial  Assessment:   44 y.o. male who has a past medical history of Anxiety, Hypertension, and atrial Septal defect status post repair, complicated by paroxysmal AFib status post ablation. presents to the emergency department due to  hemoptysis that has been ongoing for the past 3 days.  Patient in no acute distress.  Vital signs notable for mildly elevated blood pressure otherwise unremarkable.  Presenting EKG without any ischemic changes.  Given patient's endorsement of hemoptysis will obtain a D-dimer for risk stratification.  Lungs clear to auscultation.  Will obtain cardiac workup and reassess.  Suspect symptoms may be secondary to bronchitis vs pharyngeal irritation given he uses CPAP without a humidifier.  Differential Diagnosis:   Differential Diagnosis includes, but is not limited to:  Viral URI, Strep pharyngitis, viral pharyngitis, foreign body aspiration/ingestion, bronchitis, asthma exacerbation, CHF exacerbation, COPD exacerbation, allergy/atopy, influenza, pertussis, PE, pneumonia, lung abscess, fungal infection, TB, epiglottitis.   Clinical Tests:   Lab Tests: Ordered and Reviewed  Radiological Study: Ordered and Reviewed  Medical Tests: Ordered and Reviewed             ED Course as of 06/22/25 2330   Sun Jun 22, 2025   2235 Independent Interpretation of EKG:  Rhythm: Sinus   Rate: 93  QTC: 417  No STEMI  [AS]   2248 CBC Auto Differential(!)  CBC reviewed and interpreted by me:   No significant leukocytosis, anemia (at baseline), or platelet abnormalities.   [AS]   2313 BNP [AS]   2313 X-Ray Chest PA And Lateral [AS]   2328 Suspect patient's hemoptysis likely due to pharyngeal irritation given use of CPAP machine at night without humidifier.  D-dimer negative.  A pretest probability for PE. Pt is currently stable for discharge. I see no indication of an emergent process beyond that addressed during our encounter but have duly counseled the patient/family regarding the need for prompt follow-up as well as  the indications that should prompt immediate return to the emergency room should new or worrisome developments occur. I discussed the ED work up and diagnostic findings with the patient/family. The patient/family has been provided with verbal and printed direction regarding our final diagnosis(es) as well as instructions regarding use of OTC and/or Rx medications intended to manage the patient's aforementioned conditions. The patient/family verbalized an understanding. The patient/family is asked if there are any questions or concerns. We discuss the case, until all issues are addressed to the patient/family's satisfaction. Patient/family understands and is agreeable to the plan.  [AS]      ED Course User Index  [AS] Andi Rojas MD          Medical Decision Making  Amount and/or Complexity of Data Reviewed  Labs: ordered. Decision-making details documented in ED Course.  Radiology:  Decision-making details documented in ED Course.           Clinical Impression:   Final diagnoses:  [R04.2] Hemoptysis (Primary)  [R00.0] Tachycardia          ED Disposition Condition    Discharge Stable          ED Prescriptions    None       Follow-up Information       Follow up With Specialties Details Why Contact Info    Gómez Parisi MD Family Medicine Schedule an appointment as soon as possible for a visit  for reassesment [address]  [place] [address]  [phone]      [facility] Emergency Medicine  If symptoms worsen [address] [place] [address] [facility] [place] [address] [phone]            DISCLAIMER: This note was prepared with [employer] voice recognition transcription software. Garbled syntax, mangled pronouns, and other bizarre constructions may be attributed to that software system.         [1]   Social History  Tobacco Use    Smoking status: Never    Smokeless tobacco: Never   Substance Use Topics    Alcohol use: Yes     Comment: socially      Drug use: Yes     Types: Marijuana        Andi Rojas MD  06/22/25 0678

## 2025-07-18 ENCOUNTER — OFFICE VISIT (OUTPATIENT)
Dept: OTOLARYNGOLOGY | Facility: CLINIC | Age: 45
End: 2025-07-18
Payer: COMMERCIAL

## 2025-07-18 ENCOUNTER — TELEPHONE (OUTPATIENT)
Dept: GASTROENTEROLOGY | Facility: CLINIC | Age: 45
End: 2025-07-18
Payer: COMMERCIAL

## 2025-07-18 VITALS
WEIGHT: 239 LBS | DIASTOLIC BLOOD PRESSURE: 81 MMHG | SYSTOLIC BLOOD PRESSURE: 123 MMHG | BODY MASS INDEX: 34.22 KG/M2 | HEIGHT: 70 IN

## 2025-07-18 DIAGNOSIS — K22.6 MALLORY-WEISS TEAR: ICD-10-CM

## 2025-07-18 DIAGNOSIS — R04.2 HEMOPTYSIS: Primary | ICD-10-CM

## 2025-07-18 DIAGNOSIS — J34.3 NASAL TURBINATE HYPERTROPHY: ICD-10-CM

## 2025-07-18 DIAGNOSIS — G47.33 OBSTRUCTIVE SLEEP APNEA: ICD-10-CM

## 2025-07-18 DIAGNOSIS — J30.2 SEASONAL ALLERGIC RHINITIS, UNSPECIFIED TRIGGER: ICD-10-CM

## 2025-07-18 RX ORDER — AZELASTINE 1 MG/ML
1 SPRAY, METERED NASAL 2 TIMES DAILY
Qty: 9.99 ML | Refills: 3 | Status: SHIPPED | OUTPATIENT
Start: 2025-07-18

## 2025-07-18 RX ORDER — FLUTICASONE PROPIONATE 50 MCG
2 SPRAY, SUSPENSION (ML) NASAL 2 TIMES DAILY
Qty: 32 G | Refills: 3 | Status: SHIPPED | OUTPATIENT
Start: 2025-07-18

## 2025-07-18 RX ORDER — BISOPROLOL FUMARATE 5 MG/1
2.5 TABLET, FILM COATED ORAL
COMMUNITY
Start: 2025-03-12 | End: 2026-03-12

## 2025-07-18 RX ORDER — AZELASTINE 1 MG/ML
SPRAY, METERED NASAL
COMMUNITY
Start: 2025-02-13

## 2025-07-18 NOTE — TELEPHONE ENCOUNTER
----- Message from Nurse Nhi sent at 7/18/2025  9:23 AM CDT -----  Dr. Salguero placed referral for pt, please call to schedule  Thanks, CAM Hankins

## 2025-07-18 NOTE — PROGRESS NOTES
OTOLARYNGOLOGY CLINIC NOTE  Date:  07/18/2025     Chief complaint:  Chief Complaint   Patient presents with    Cough     Feel like mucous builds up in throat and coughs it up and sees it bright red blood. Doesn't not happen ofter but has been occurring since 2019. Bright red, last one happened last week       History of Present Illness  Garth Tejeda is a 44 y.o. male  presenting today for a new evaluation and treatment of   Hemoptysis. Went to ER 6-22-25 for this. Per note from dr araiza had a history of colton- george tear  Happened once with laying down but other times was sitting or standing and did not see any bleeding from nose  Bright red blood- happens about 4 times over the years ; feels like mucous building up and then coughs up blood. Feels like a different type of cough to him like it is sharper. usually one time it is gone . This most recent time 2-3 days straight. Humidifier on cpap at second home does not work so thought it was the cpap. Dr araiza put him on some antibiotics. Had been trying to aggressively cough it up as well  Before felt a bit congested and did some nasal sprays . This was Thursday and hemoptysis happened on Monday/Tuesday. This time was the worst it has been .   was diagnosed with covid in march .   No voice changes. No swallowing issues. No throat pain   No tuberculosis exposure that he is aware of     Saw pulm in the past for sleep apnea but not for hemoptysis  8 months ago was diagnosed with a fib and had been on blood thinners and it happened   When originally happened in 2019 was diagnosed with a colton george tear by er doctor; has not seen GI    I saw him in December of 2020 for elongated uvula( 12-7-20 ). History at that time is as noted below (below HPI copied from previous note on date  of initial visit  describing history at presentation)  Uvula had felt irritated when saw Dr araiza before and got abx but it is long  Does not think he snores but is not sure. His wife does  not complain about him snoring  Wakes up with dry mouth   When it was inflammed woke up with gagging choking but normally not an issue with that  More problematic with coughing/choking if sleeps on back but if sleeps on side not a problem. No nocturia  Feels like he gets good sleep otherwise     No issues with nasal congestion.       Was born with ASD.     Past Medical History  Past Medical History:   Diagnosis Date    Anxiety     Hypertension     Septal defect         Past Surgical History  Past Surgical History:   Procedure Laterality Date    CARDIAC SURGERY      WRIST SURGERY      right        Medications  Medications Ordered Prior to Encounter[1]    Review of Systems  Review of Systems   Constitutional:  Negative for fever.   HENT:  Positive for congestion (switches sides). Negative for nosebleeds.    Eyes:  Negative for double vision.   Respiratory:  Positive for hemoptysis. Negative for shortness of breath and wheezing.    Cardiovascular:  Negative for chest pain.   Gastrointestinal:  Negative for blood in stool and heartburn.   Genitourinary:  Negative for hematuria.   Neurological:  Negative for headaches.   Endo/Heme/Allergies:  Positive for environmental allergies. Does not bruise/bleed easily.        Social History   reports that he has never smoked. He has never used smokeless tobacco. He reports current alcohol use. He reports current drug use. Drug: Marijuana.     Family History  Family History   Problem Relation Name Age of Onset    Diabetes Mother      Hypertension Mother          Physical Exam   Vitals:    07/18/25 0831   BP: 123/81    Body mass index is 34.29 kg/m².          GENERAL: no acute distress.  HEAD: normocephalic.   EYES: lids and lashes normal. No scleral icterus  EARS: external ear without lesion, normal pinna shape and position.  External auditory canal with normal cerumen, tympanic membrane fully visible, no perforation , no retraction. No middle ear effusion. Ossicles intact.   NOSE:  external nose without significant bony abnormality  ORAL CAVITY/OROPHARYNX: tongue midline and mobile. Symmetric palate rise. Uvula midline.   NECK: trachea midline.   LYMPH NODES:No cervical lymphadenopathy.  RESPIRATORY: no stridor, no stertor. Voice normal. Respirations nonlabored.  NEURO: alert, responds to questions appropriately.   PSYCH:mood appropriate    PROCEDURE NOTE  NAME OF PROCEDURE: Flexible Laryngoscopy, diagnostic  INDICATIONS: gag reflex precludes mirror exam, hemoptysis  FINDINGS: septum deviated to right mild; turbinate hypertrophy. Pinpoint blood from sprays. No mass or lesion. No blood in larynx/subglottis/pharynx    Consent: After procedure was explained in detail and all questions answered, verbal consent was obtained for performing flexible laryngoscopy.  Anesthesia: topical 4% lidocaine and neosynephrine  Procedure: With patient in seated position, the scope was inserted into the bilateral nasal passageway and advanced atraumatically into the nasopharynx to examine the following structures:  Nasal cavity: Turbinates with  hypertrophy. mild middle meatal edema. No purulent drainage. Deviated septum to right  Nasopharynx: no mass or lesion noted in nasopharynx.   Oropharynx: base of tongue without  mass or ulceration. Lingual tonsils symmetric and without hypertrophy  Hypopharynx: posterior pharyngeal wall without mass or lesion. No pooling of secretions. Pyriform sinuses visible without mass or lesion  Larynx: epiglottis normal without lesion. False vocal folds without edema/erythema/lesion. True vocal folds mobile and without lesion. Mild interarytenoid edema no erythema . Postcricoid region with mild edema no lesion   Subglottis: visualized portion of subglottis normal in appearance    After examination performed, the scope was removed atraumatically . The patient tolerated the procedure well. Photodocumentation obtained with representative images below, all images and/or videos uploaded  "in media section of epic.                                                          Imaging:  The patient does not have any pertinent and/or recent imaging of the head and neck.   Chest xray images and report reviewed . Report in quotations below  "No acute cardiopulmonary process.     Elevated left hemidiaphragm with subsegmental atelectatic change left base."      2024 cxr      Labs:  CBC  Recent Labs   Lab 08/26/24  0828 11/25/24  0237 06/22/25  2238   WBC 7.22 8.67 7.05   Hemoglobin 15.1 14.7  --    HGB  --   --  14.8   Hematocrit 46.4 43.4  --    HCT  --   --  45.1   MCV 84 82 82   Platelet Count  --   --  254   Platelets 289 279  --      BMP  Recent Labs   Lab 08/26/24  0828 11/25/24  0237 06/22/25  2238   Glucose 88 96 86   Sodium 141 138 140   Potassium 3.4 L 4.1 4.1   Chloride 105 107 105   CO2 26 19 L 24   BUN 11 13 10   Creatinine 1.0 0.9 1.1   Calcium 9.3 9.2 9.6     COAGS        Assessment  1. Nasal turbinate hypertrophy  - Ambulatory referral/consult to ENT    2. Hemoptysis  - Quantiferon Gold TB; Future  - Ambulatory referral/consult to Gastroenterology; Future  - CT Chest With Contrast; Future  - Creatinine, Serum; Future  - Ambulatory referral/consult to Pulmonology; Future    3. Seasonal allergic rhinitis, unspecified trigger    4. Obstructive sleep apnea    5. Colton-Schrader tear  - Ambulatory referral/consult to Gastroenterology; Future       Plan:  Discussed plan of care with patient in detail and all questions answered. Patient reported understanding of plan of care. I gave the patient the opportunity to ask questions and patient confirmed all questions answered to satisfaction.     Gi referral - history of colton schrader tear ? Diagnosed by er. Unclear but  if hematemesis or hemoptysis at that time, reviewed notes from er 3- and states he had forceful vomiting prior to bleeding. He never saw a GI . Unclear if same condition as then or if hemoptysis now was actually hemoptysis then and not " actual hematemesis but recommend gi eval in case they would recommend egd given history of possible colton george tear although I think condition now does sound more like hemoptysis so I would like for him to see pulm  Pulm referrral placed for hemoptysis, ct chest with contrast ordered also . Discussed with him about cxr findings  No source from ent scope exam today. Do not think epistaxis. Would be extremely unusual to not see bleeding and cough up blood unless had been laying down - had asym elevation of left hemidiaphragm on cxr in 2019 as well when went to er for above  Will check quant gold   Did have some areas in nose with pinpoint blood today, nasal passageway dry. Will have him do saline and can add vaseline if gets bleeding from nose  Saline flonase and astelin for nasal congestion   Xylimelts for moisture of mouth and throat - can use with cpap   F/u 6 months sooner if issue             [1]   Current Outpatient Medications on File Prior to Visit   Medication Sig Dispense Refill    albuterol (PROVENTIL/VENTOLIN HFA) 90 mcg/actuation inhaler Inhale 2 puffs into the lungs every 6 (six) hours as needed for Wheezing. 18 g 11    aspirin (ECOTRIN) 81 MG EC tablet Take 81 mg by mouth once daily.      desloratadine (CLARINEX) 5 mg tablet Take 1 tablet (5 mg total) by mouth once daily. 90 tablet 3    dextroamphetamine-amphetamine 10 mg Tab Take 1 tablet (10 mg total) by mouth 2 (two) times daily. 60 tablet 0    dextroamphetamine-amphetamine 10 mg Tab Take 1 tablet (10 mg total) by mouth 2 (two) times daily. 60 tablet 0    dextroamphetamine-amphetamine 10 mg Tab Take 1 tablet (10 mg total) by mouth 2 (two) times daily. 60 tablet 0    diazePAM (VALIUM) 10 MG Tab TAKE ONE TABLET BY MOUTH TWICE DAILY 60 tablet 2    doxycycline (VIBRA-TABS) 100 MG tablet Take 1 tablet (100 mg total) by mouth 2 (two) times daily. 28 tablet 0    fluticasone propionate (FLONASE) 50 mcg/actuation nasal spray SMARTSI Spray(s) Both Nares  Every Evening      hydroCHLOROthiazide (HYDRODIURIL) 25 MG tablet Take 25 mg by mouth.      lisinopriL 10 MG tablet Take 1 tablet (10 mg total) by mouth 2 (two) times daily. 180 tablet 3    ondansetron (ZOFRAN-ODT) 8 MG TbDL Take 1 tablet (8 mg total) by mouth 2 (two) times daily. 20 tablet 11    spironolactone (ALDACTONE) 25 MG tablet Take 25 mg by mouth.      testosterone cypionate (DEPOTESTOTERONE CYPIONATE) 200 mg/mL injection INJECT 1 ML INTRAMUSCULARLY ONCE A WEEK 10 mL 2     No current facility-administered medications on file prior to visit.

## 2025-07-18 NOTE — TELEPHONE ENCOUNTER
MA attempted to contact patient, but he did not answer. Unable to leave a voicemail. Patient portal message sent to patient to inform him of scheduled GI appointment.

## 2025-07-18 NOTE — PATIENT INSTRUCTIONS
Information and instructions from your visit with me today:  Always use saline every time before a medication spray. You can also use saline on its own. If you are using saline and/or the medication sprays on an as needed basis and you have symptoms use the regimen daily for at least 2 weeks. You can use the flonase and astelin together, or if you prefer to start with just one medication spray, the flonase works better by itself compared to astelin by itself. You can try doing the saline and flonase and if still congested, add on the astelin again doing this regimen daily for up to two weeks when congestion. There may be times of the year that you only need saline and there may be times of the year that you need saline, flonase and astelin to control symptoms.     Start using the following medication nasal sprays:   Fluticasone spray:    This medication is a steroid spray. It stays within the nose and does not have absorption into the body that leads to side effects that one has with oral steroid medication. Fluticasone nasal spray is the same as the Flonase brand nasal spray. Discuss with your pharmacist if the price is lower over the counter or with a prescription ( this varies depending on insurance). The medication that is over the counter is the same as the prescription medication. Use this medication as instructed on the prescription, 1-2 sprays on each side of your nose twice daily.     Azelastine  spray:  This medication is an antihistamine used to treat nasal symptoms of allergy, which works specifically in the nose unlike antihistamine pills which have more of an effect on the whole body. Use this medication as instructed on the prescription, 1 spray on each side of your nose twice daily.     Additional instructions for medication sprays  Place the tip of the medication bottle in your nose and aim slightly up and out on each side to get medication high and deep into your nose and sinuses, and not have it  "all deposit in the very front of your nose. Aim the tip of the nozzle towards the outer corner of your eye . You can imagine aiming towards the back of your eyeball on each side for this, as opposed to straight back to the center of your nose and head.     You need to use this medication every day regardless of symptoms, as it takes time ( a few weeks) to work and get the benefits. It does not work on an "as needed" basis like taking a decongestant. If your symptoms only occur in a particular season, then the medication can be used seasonally instead of year long. For seasonal symptoms, you should start using the spray twice daily a month before when you normally have symptoms ( for example, if symptoms start in August, should start at the end of June).     Start nasal irrigations with saline solution- you can either use a rinse or a mist spray:    NASAL SALINE SPRAY ( simply saline and arm and hammer are examples) There are several different brands found in the cold and flu aisle of the pharmacy. You can use any brand of saline spray - this will deliver the saline by a gentle mist ( if you have difficulty or discomfort with nasal rinse/ a lot of fluid in the nose, this will be more comfortable).       Always rinse your nose with saline prior to using medication sprays and wait a couple of hours before using again. You can use the saline throughout the day to help with stuffy nose or dry nose.    Do not use nasal decongestant sprays such as Afrin or similar products long term ( over 3 days) .  This can cause long term physical nasal addiction. Afrin should only be used if having nose bleeds, severe nasal congestion , or severe ear pain/fullness and should not be used for more than 2-3 days in a row . It is a not a medication that should be used for a long period of time.     It was nice meeting you today, and I look forward to helping you feel better soon. Please don't hesitate to call if you have any other " questions or concerns, or if I can be of any assistance in the meantime.      Brigida Salguero MD    [facility]     Phone  [phone]    Fax      [phone]        Brigida Salguero MD  Otorhinolaryngology

## 2025-07-28 ENCOUNTER — LAB VISIT (OUTPATIENT)
Dept: LAB | Facility: HOSPITAL | Age: 45
End: 2025-07-28
Attending: OTOLARYNGOLOGY
Payer: COMMERCIAL

## 2025-07-28 DIAGNOSIS — R04.2 HEMOPTYSIS: ICD-10-CM

## 2025-07-28 LAB
CREAT SERPL-MCNC: 0.9 MG/DL (ref 0.5–1.4)
GFR SERPLBLD CREATININE-BSD FMLA CKD-EPI: >60 ML/MIN/1.73/M2

## 2025-07-28 PROCEDURE — 86480 TB TEST CELL IMMUN MEASURE: CPT

## 2025-07-28 PROCEDURE — 36415 COLL VENOUS BLD VENIPUNCTURE: CPT

## 2025-07-28 PROCEDURE — 82565 ASSAY OF CREATININE: CPT

## 2025-07-29 LAB
MITOGEN MINUS NIL (OHS): 8.04
NIL TB SYNCED (OHS): 0.02
QUANTIFERON GOLD INTERP (OHS): NEGATIVE
TB1 AG MINUS NIL (OHS): 0.01
TB2 AG MINUS NIL (OHS): <0

## 2025-08-04 ENCOUNTER — LAB VISIT (OUTPATIENT)
Dept: LAB | Facility: HOSPITAL | Age: 45
End: 2025-08-04
Attending: INTERNAL MEDICINE
Payer: COMMERCIAL

## 2025-08-04 ENCOUNTER — OFFICE VISIT (OUTPATIENT)
Dept: PULMONOLOGY | Facility: CLINIC | Age: 45
End: 2025-08-04
Payer: COMMERCIAL

## 2025-08-04 VITALS
WEIGHT: 244.5 LBS | SYSTOLIC BLOOD PRESSURE: 118 MMHG | OXYGEN SATURATION: 96 % | HEART RATE: 92 BPM | BODY MASS INDEX: 35.08 KG/M2 | DIASTOLIC BLOOD PRESSURE: 73 MMHG

## 2025-08-04 DIAGNOSIS — I48.0 PAROXYSMAL A-FIB: ICD-10-CM

## 2025-08-04 DIAGNOSIS — I50.32 CHRONIC DIASTOLIC HEART FAILURE: ICD-10-CM

## 2025-08-04 DIAGNOSIS — Z87.74 HISTORY OF CONGENITAL HEART DEFECT: ICD-10-CM

## 2025-08-04 DIAGNOSIS — R04.2 HEMOPTYSIS: Primary | ICD-10-CM

## 2025-08-04 DIAGNOSIS — E66.01 SEVERE OBESITY (BMI 35.0-39.9) WITH COMORBIDITY: ICD-10-CM

## 2025-08-04 DIAGNOSIS — R04.2 HEMOPTYSIS: ICD-10-CM

## 2025-08-04 DIAGNOSIS — R59.0 LOCALIZED ENLARGED LYMPH NODES: ICD-10-CM

## 2025-08-04 DIAGNOSIS — G47.33 OSA ON CPAP: ICD-10-CM

## 2025-08-04 LAB
APTT PPP: 27.9 SECONDS (ref 21–32)
INR PPP: 1 (ref 0.8–1.2)
PROTHROMBIN TIME: 10.9 SECONDS (ref 9–12.5)

## 2025-08-04 PROCEDURE — 3078F DIAST BP <80 MM HG: CPT | Mod: CPTII,S$GLB,, | Performed by: INTERNAL MEDICINE

## 2025-08-04 PROCEDURE — 99215 OFFICE O/P EST HI 40 MIN: CPT | Mod: S$GLB,,, | Performed by: INTERNAL MEDICINE

## 2025-08-04 PROCEDURE — 4010F ACE/ARB THERAPY RXD/TAKEN: CPT | Mod: CPTII,S$GLB,, | Performed by: INTERNAL MEDICINE

## 2025-08-04 PROCEDURE — 99999 PR PBB SHADOW E&M-EST. PATIENT-LVL IV: CPT | Mod: PBBFAC,,, | Performed by: INTERNAL MEDICINE

## 2025-08-04 PROCEDURE — 85610 PROTHROMBIN TIME: CPT

## 2025-08-04 PROCEDURE — 36415 COLL VENOUS BLD VENIPUNCTURE: CPT

## 2025-08-04 PROCEDURE — 3074F SYST BP LT 130 MM HG: CPT | Mod: CPTII,S$GLB,, | Performed by: INTERNAL MEDICINE

## 2025-08-04 PROCEDURE — 3008F BODY MASS INDEX DOCD: CPT | Mod: CPTII,S$GLB,, | Performed by: INTERNAL MEDICINE

## 2025-08-04 PROCEDURE — 1159F MED LIST DOCD IN RCRD: CPT | Mod: CPTII,S$GLB,, | Performed by: INTERNAL MEDICINE

## 2025-08-04 PROCEDURE — 85730 THROMBOPLASTIN TIME PARTIAL: CPT

## 2025-08-04 NOTE — PROGRESS NOTES
General Pulmonary Clinic  New Patient Visit    Chief Complaint: possible hemoptysis     HPI     Garth Tejeda is a 44 y.o. male with chronic diastolic heart failure now 50-55%, RVSF milldy reduced, c pAF, VALENTÍN on CPAP, hx of ASD repair in childhood, hx of colton schrader tear presenting with chief complaint of possible hemoptysis    # possible hemoptysis  # VALENTÍN on CPAP    ER note 6/22/25 reviewed - presented w/ coughing fit with sputum with streaks of blood, CBC w/out anemia but RDW elevated, Cr 1.1, BNP neg, D-dimer neg. Attributed to pharyngeal irritation from non-humidified cpap  CXR 6/22/25 personally reviewed with gastric bubble w/ elevated L diaphragm    ENT note 7/18/25 reviewed - here he describes bright red blood w/ coughing that happens at least 3 times that started since starting anticoagulation for afib. 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 He has since stopped heavy drinking. Most recent hemoptysis episode occurred 2-3 months ago, lasting three days. He experiences bright red blood and can feel a sensation in the throat area prior to hemoptysis.  This event was preceded with He recently underwent dental work with tooth extraction just a day prior and was more severe than his other episodes  He reports only three episodes of hemoptysis since 2017, describing them as infrequent and usually just blood tinged sputum    He was previously on anticoagulants, which were discontinued after hemoptysis onset. He was only on aspirin at the time of this event     He denies associated symptoms of shortness of breath, chronic cough, wheexing or chest congestion. He denies  excessive bleeding with dental work or surgeries, history of blood clots, or nosebleeds. He reports rare acid reflux occurring approximately once per quarter    MEDICAL HISTORY:  He has a history of atrial  fibrillation, sleep apnea currently managed with CPAP, and status post ASD repair. He has a history of reduced heart function that has now improved with recovery. He reports cardiac function has significantly improved      MEDICATIONS:  He is currently taking baby aspirin and Mounjaro 2 mg shots for weight loss for approximately 2 years, with weight loss progress currently stagnant in recent months.      CPAP compliance report reviewed  Airsense 10 autosense CPAP 5-11  Using 77% of past 30 days  No significant leak  Residual AHI 0.8  He is using humidifier 24/7      Childhood: no history of wheezing, coughing, asthma; no recurrent pneumonias or infections    Exposures  No smoking  No marijuana  no vaping  no mold or water damage in home   Pets - pet dog (dash  : a/c heating chemical - office  refridgerant, setaline, no known asbestos exposure..            Objective   Past History     Past Medical History:  Past Medical History:   Diagnosis Date    Anxiety     Hypertension     Septal defect          Past Surgical History:  Past Surgical History:   Procedure Laterality Date    CARDIAC SURGERY      WRIST SURGERY      right         Social History:   Social History     Socioeconomic History    Marital status:    Tobacco Use    Smoking status: Never    Smokeless tobacco: Never   Substance and Sexual Activity    Alcohol use: Yes     Comment: socially     Drug use: Yes     Types: Marijuana    Sexual activity: Yes     Partners: Female   Social History Narrative    HAS [employer] business     Social Drivers of Health     Financial Resource Strain: Low Risk  (6/14/2022)    Received from [facility]    Overall Financial Resource Strain (CARDIA)     Difficulty of Paying Living Expenses: Not very hard   Food Insecurity: No Food Insecurity (6/14/2022)    Received [facility]    Hunger Vital Sign     Worried About Running Out of Food in the Last Year: Never true     Ran Out of Food in the Last Year: Never true    Transportation Needs: No Transportation Needs (6/14/2022)    Received from [facility]    PRAPARE - Transportation     Lack of Transportation (Medical): No     Lack of Transportation (Non-Medical): No   Physical Activity: Inactive (6/14/2022)    Received from [facility]    Exercise Vital Sign     Days of Exercise per Week: 0 days     Minutes of Exercise per Session: 0 min   Stress: Stress Concern Present (6/14/2022)    Received from [facility]    [employer] [place] [employer] - Occupational Stress Questionnaire     Feeling of Stress : Rather much   Housing Stability: Low Risk  (6/14/2022)    Received from [facility]    Housing Stability Vital Sign     Unable to Pay for Housing in the Last Year: No     Number of Places Lived in the Last Year: 1     Unstable Housing in the Last Year: No         Family Hx:  Mother - copd  Grandfather (maternal) - lung cancer dx in his 70s    Allergies and Pertinent Medications   Allergies and Medications Reviewed    Patient has no known allergies.  [unfilled]             Physical Exam   /73   Pulse 92   Wt 110.9 kg (244 lb 7.8 oz)   SpO2 96%   BMI 35.08 kg/m²       Constitutional: No acute distress, Atraumatic   HEENT: moist mucus membranes, extraocular movements intact  Cardiovascular: regular rate and rhythm, no murmurs, rubs or gallops  Pulmonary: normal respiratory rate and chest rise, no chest wall deformity, normal breath sounds with no wheezing or crackles  Abdominal: non-distended, bowel sounds present  Musculoskeletal: No lower extremity edema, no clubbing  Neurological: normal speech/b[place], moves all extremities against gravity  Skin: no finger cyanosis, no rashes on exposed body parts  Psych: Appropriate affect, normal mood       Diagnostic Studies      All diagnostic studies relevant to chief complaint reviewed personally    Labs:  Lab Results   Component Value Date    WBC 7.05 06/22/2025    HGB 14.8 06/22/2025    HGB 14.7 11/25/2024    PLT  "254 06/22/2025     11/25/2024    MCV 82 06/22/2025    MCV 82 11/25/2024     Lab Results   Component Value Date     06/22/2025     11/25/2024    K 4.1 06/22/2025    K 4.1 11/25/2024    CO2 24 06/22/2025    CO2 19 (L) 11/25/2024    BUN 10 06/22/2025    CREATININE 0.9 07/28/2025     Lab Results   Component Value Date    AST 15 06/22/2025    AST 16 11/25/2024    ALT 19 06/22/2025    ALT 33 11/25/2024    ALBUMIN 4.2 06/22/2025    ALBUMIN 3.8 11/25/2024    ALBUMIN 4.2 11/03/2021    PROT 8.1 06/22/2025    PROT 7.4 11/25/2024    BILITOT 0.5 06/22/2025    BILITOT 0.4 11/25/2024         PFTs:  Pulmonary Functions Testing Results:  No results found for: "FEV1", "FVC", "WHF8WMG", "TLC", "DLCO"      FVC FEV1 FEV/FVC TLC DLCO 6MWT Interpret                                                         TTE:  No results found for this or any previous visit.            Assessment and Plan   Garth Tejeda is a 44 y.o. male  presenting with chief complaint of hemoptysis    Problem List:  # hemoptysis - chronic, worsening - showed cup of bright red blood from incident in 6/2025 (however mixed w/ H20). He does not have signs or symptoms of bronchitis or bronchiectasis and no evidence of PH on most recent TTE (but PASP read as 7 so likely not accurate). The most recent incident was preceding by tooth extraction so possible this may have been the source  - We will do CT neck and chest to r/o anatomical abnl that may be a the source. Check PFTs for obstruction. Labs to assess clotting  - consider PH/ASD w/u    # VALENTÍN on cPAP - chronic, stable - well controlled on cPAP, continue supplies reordered    # chronic diastolic heart failure, ASD -   # paroxysmal Afib  continue management per dr. Bernard  On asa  Contiue cpap    # severe obesity BMI 35 - continue moujaro per PCP    Explained to the patient I work Monday-Thursdays, so any results or messages received after working hours Thursday through Monday AM would not be addressed " until I was back in the office. If he/she experienced any acute symptoms he/she should go the emergency room for immediate help.    Differential, diagnoses, diagnostic work up, and possible treatments were discussed with the patient. Questions were answered.    Noelle Garcia MD  Pulmonary-Critical Care Medicine              For this visit, the following time was spent  preparing to see the patient (e.g., review of tests) 15 minutes  obtaining and/or reviewing separately obtained history 0 minutes  Performing a medically necessary appropriate examination and/or evaluation 14 minutes  Counseling and educating the patient/family/caregiver 17 minutes  Ordering medications, tests, or procedures 2 minutes  Referring and communicating with other health care professionals (when not reported separately) 0minutes  Documenting clinical information in the electronic or other health record 5minutes  Care coordination (not reported separately) 0minutes    Total time = 43 minutes

## 2025-08-06 ENCOUNTER — PATIENT MESSAGE (OUTPATIENT)
Dept: FAMILY MEDICINE | Facility: CLINIC | Age: 45
End: 2025-08-06
Payer: COMMERCIAL

## 2025-08-11 ENCOUNTER — HOSPITAL ENCOUNTER (OUTPATIENT)
Dept: RADIOLOGY | Facility: HOSPITAL | Age: 45
Discharge: HOME | End: 2025-08-11
Attending: INTERNAL MEDICINE
Payer: COMMERCIAL

## 2025-08-11 ENCOUNTER — HOSPITAL ENCOUNTER (OUTPATIENT)
Dept: RESPIRATORY THERAPY | Facility: HOSPITAL | Age: 45
Discharge: HOME | End: 2025-08-11
Attending: INTERNAL MEDICINE
Payer: COMMERCIAL

## 2025-08-11 VITALS — OXYGEN SATURATION: 99 % | HEART RATE: 91 BPM | RESPIRATION RATE: 18 BRPM

## 2025-08-11 DIAGNOSIS — R59.0 LOCALIZED ENLARGED LYMPH NODES: ICD-10-CM

## 2025-08-11 DIAGNOSIS — R04.2 HEMOPTYSIS: ICD-10-CM

## 2025-08-11 PROCEDURE — 70490 CT SOFT TISSUE NECK W/O DYE: CPT | Mod: TC

## 2025-08-11 PROCEDURE — 71250 CT THORAX DX C-: CPT | Mod: TC

## 2025-08-11 PROCEDURE — 70490 CT SOFT TISSUE NECK W/O DYE: CPT | Mod: 26,,, | Performed by: RADIOLOGY

## 2025-08-11 PROCEDURE — 25000242 PHARM REV CODE 250 ALT 637 W/ HCPCS: Performed by: INTERNAL MEDICINE

## 2025-08-11 PROCEDURE — 94729 DIFFUSING CAPACITY: CPT

## 2025-08-11 PROCEDURE — 94200 LUNG FUNCTION TEST (MBC/MVV): CPT

## 2025-08-11 PROCEDURE — 71250 CT THORAX DX C-: CPT | Mod: 26,,, | Performed by: RADIOLOGY

## 2025-08-11 RX ORDER — ALBUTEROL SULFATE 2.5 MG/.5ML
2.5 SOLUTION RESPIRATORY (INHALATION) ONCE
Status: COMPLETED | OUTPATIENT
Start: 2025-08-11 | End: 2025-08-11

## 2025-08-11 RX ADMIN — ALBUTEROL SULFATE 2.5 MG: 2.5 SOLUTION RESPIRATORY (INHALATION) at 08:08

## 2025-08-12 LAB
BRPFT: ABNORMAL
DLCO ADJ PRE: 27.56 ML/(MIN*MMHG) (ref 25.25–39.1)
DLCO SINGLE BREATH LLN: 25.25
DLCO SINGLE BREATH PRE REF: 86.1 %
DLCO SINGLE BREATH REF: 32.18
DLCOC SBVA LLN: 3.29
DLCOC SBVA PRE REF: 119 %
DLCOC SBVA REF: 4.51
DLCOC SINGLE BREATH LLN: 25.25
DLCOC SINGLE BREATH PRE REF: 85.7 %
DLCOC SINGLE BREATH REF: 32.18
DLCOVA LLN: 3.29
DLCOVA PRE REF: 119.7 %
DLCOVA PRE: 5.39 ML/(MIN*MMHG*L) (ref 3.29–5.72)
DLCOVA REF: 4.51
DLVAADJ PRE: 5.36 ML/(MIN*MMHG*L) (ref 3.29–5.72)
ERVN2 LLN: -16448.61
ERVN2 PRE REF: 28.8 %
ERVN2 PRE: 0.4 L (ref -16448.61–16451.39)
ERVN2 REF: 1.39
FEF 25 75 CHG: 13.6 %
FEF 25 75 LLN: 2.13
FEF 25 75 POST REF: 67.2 %
FEF 25 75 PRE REF: 59.2 %
FEF 25 75 REF: 3.81
FET100 CHG: -18.8 %
FEV1 CHG: 4 %
FEV1 FVC CHG: 4.4 %
FEV1 FVC LLN: 69
FEV1 FVC POST REF: 96.3 %
FEV1 FVC PRE REF: 92.3 %
FEV1 FVC REF: 80
FEV1 LLN: 3.2
FEV1 POST REF: 73 %
FEV1 PRE REF: 70.2 %
FEV1 REF: 4.06
FRCN2 LLN: 2.49
FRCN2 PRE REF: 47.4 %
FRCN2 REF: 3.48
FVC CHG: -0.3 %
FVC LLN: 4.03
FVC POST REF: 75.4 %
FVC PRE REF: 75.7 %
FVC REF: 5.12
IVC PRE: 3.6 L (ref 4.03–6.2)
IVC SINGLE BREATH LLN: 4.03
IVC SINGLE BREATH PRE REF: 70.3 %
IVC SINGLE BREATH REF: 5.12
PEF CHG: 52.8 %
PEF LLN: 7.73
PEF POST REF: 72.7 %
PEF PRE REF: 47.6 %
PEF REF: 10.05
POST FEF 25 75: 2.56 L/S (ref 2.13–5.49)
POST FET 100: 9.26 SEC
POST FEV1 FVC: 76.77 % (ref 69.08–90.33)
POST FEV1: 2.96 L (ref 3.2–4.93)
POST FVC: 3.86 L (ref 4.03–6.2)
POST PEF: 7.3 L/S (ref 7.73–12.37)
PRE DLCO: 27.71 ML/(MIN*MMHG) (ref 25.25–39.1)
PRE FEF 25 75: 2.25 L/S (ref 2.13–5.49)
PRE FET 100: 11.4 SEC
PRE FEV1 FVC: 73.55 % (ref 69.08–90.33)
PRE FEV1: 2.85 L (ref 3.2–4.93)
PRE FRC N2: 1.65 L
PRE FVC: 3.88 L (ref 4.03–6.2)
PRE PEF: 4.78 L/S (ref 7.73–12.37)
RVN2 LLN: 1.42
RVN2 PRE REF: 59.8 %
RVN2 PRE: 1.25 L (ref 1.42–2.77)
RVN2 REF: 2.09
RVN2TLCN2 LLN: 22.53
RVN2TLCN2 PRE REF: 83.3 %
RVN2TLCN2 PRE: 26.26 % (ref 22.53–40.49)
RVN2TLCN2 REF: 31.51
TLCN2 LLN: 5.99
TLCN2 PRE REF: 66.6 %
TLCN2 PRE: 4.76 L (ref 5.99–8.29)
TLCN2 REF: 7.14
VA PRE: 5.16 L (ref 6.99–6.99)
VA SINGLE BREATH LLN: 6.99
VA SINGLE BREATH PRE REF: 73.8 %
VA SINGLE BREATH REF: 6.99
VCMAXN2 LLN: 4.03
VCMAXN2 PRE REF: 68.6 %
VCMAXN2 PRE: 3.51 L (ref 4.03–6.2)
VCMAXN2 REF: 5.12

## 2025-08-12 PROCEDURE — 94729 DIFFUSING CAPACITY: CPT | Mod: 26,,, | Performed by: INTERNAL MEDICINE

## 2025-08-12 PROCEDURE — 94010 BREATHING CAPACITY TEST: CPT | Mod: 26,,, | Performed by: INTERNAL MEDICINE

## 2025-08-12 PROCEDURE — 94727 GAS DIL/WSHOT DETER LNG VOL: CPT | Mod: 26,,, | Performed by: INTERNAL MEDICINE

## 2025-08-20 ENCOUNTER — OFFICE VISIT (OUTPATIENT)
Dept: GASTROENTEROLOGY | Facility: CLINIC | Age: 45
End: 2025-08-20
Payer: COMMERCIAL

## 2025-08-20 VITALS
HEART RATE: 103 BPM | BODY MASS INDEX: 36.6 KG/M2 | SYSTOLIC BLOOD PRESSURE: 107 MMHG | WEIGHT: 241.5 LBS | HEIGHT: 68 IN | DIASTOLIC BLOOD PRESSURE: 66 MMHG

## 2025-08-20 DIAGNOSIS — R04.2 HEMOPTYSIS: Primary | ICD-10-CM

## 2025-08-20 PROCEDURE — 3074F SYST BP LT 130 MM HG: CPT | Mod: CPTII,S$GLB,,

## 2025-08-20 PROCEDURE — 3008F BODY MASS INDEX DOCD: CPT | Mod: CPTII,S$GLB,,

## 2025-08-20 PROCEDURE — 3078F DIAST BP <80 MM HG: CPT | Mod: CPTII,S$GLB,,

## 2025-08-20 PROCEDURE — 99999 PR PBB SHADOW E&M-EST. PATIENT-LVL IV: CPT | Mod: PBBFAC,,,

## 2025-08-20 PROCEDURE — 4010F ACE/ARB THERAPY RXD/TAKEN: CPT | Mod: CPTII,S$GLB,,

## 2025-08-20 PROCEDURE — 99203 OFFICE O/P NEW LOW 30 MIN: CPT | Mod: S$GLB,,,

## 2025-08-20 PROCEDURE — 1159F MED LIST DOCD IN RCRD: CPT | Mod: CPTII,S$GLB,,
